# Patient Record
Sex: FEMALE | Race: WHITE
[De-identification: names, ages, dates, MRNs, and addresses within clinical notes are randomized per-mention and may not be internally consistent; named-entity substitution may affect disease eponyms.]

---

## 2017-04-30 ENCOUNTER — HOSPITAL ENCOUNTER (OUTPATIENT)
Age: 82
Discharge: ANOTHER HOSPITAL | End: 2017-04-30
Payer: MEDICARE

## 2017-04-30 ENCOUNTER — HOSPITAL ENCOUNTER (EMERGENCY)
Age: 82
Discharge: HOME | End: 2017-04-30
Payer: MEDICARE
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
## 2017-04-30 DIAGNOSIS — Z79.82: ICD-10-CM
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
## 2017-04-30 DIAGNOSIS — Z86.73: ICD-10-CM

## 2017-04-30 DIAGNOSIS — I10: ICD-10-CM

## 2017-04-30 DIAGNOSIS — S00.01XA: ICD-10-CM
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
## 2017-04-30 DIAGNOSIS — S00.03XA: Primary | ICD-10-CM
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
## 2017-04-30 DIAGNOSIS — G20: ICD-10-CM

## 2017-04-30 DIAGNOSIS — S01.01XA: Primary | ICD-10-CM

## 2017-04-30 PROCEDURE — 99284 EMERGENCY DEPT VISIT MOD MDM: CPT

## 2017-04-30 PROCEDURE — 99283 EMERGENCY DEPT VISIT LOW MDM: CPT

## 2017-06-30 ENCOUNTER — HOSPITAL ENCOUNTER (EMERGENCY)
Dept: HOSPITAL 76 - ED | Age: 82
Discharge: HOME | End: 2017-06-30
Payer: MEDICARE

## 2017-06-30 ENCOUNTER — HOSPITAL ENCOUNTER (OUTPATIENT)
Dept: HOSPITAL 76 - EMS | Age: 82
Discharge: ANOTHER HOSPITAL | End: 2017-06-30
Attending: SURGERY
Payer: MEDICARE

## 2017-06-30 VITALS — SYSTOLIC BLOOD PRESSURE: 128 MMHG | DIASTOLIC BLOOD PRESSURE: 81 MMHG

## 2017-06-30 DIAGNOSIS — I10: ICD-10-CM

## 2017-06-30 DIAGNOSIS — Z79.82: ICD-10-CM
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
## 2017-06-30 DIAGNOSIS — R53.1: ICD-10-CM
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
## 2017-06-30 DIAGNOSIS — Z86.73: ICD-10-CM
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
## 2017-06-30 DIAGNOSIS — G20: ICD-10-CM

## 2017-06-30 DIAGNOSIS — S42.202A: ICD-10-CM

## 2017-06-30 DIAGNOSIS — S00.93XA: Primary | ICD-10-CM

## 2017-06-30 DIAGNOSIS — M25.512: Primary | ICD-10-CM
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
## 2017-06-30 LAB
ANION GAP SERPL CALCULATED.4IONS-SCNC: 7 MMOL/L (ref 6–13)
BASOPHILS NFR BLD AUTO: 0.1 10^3/UL (ref 0–0.1)
BASOPHILS NFR BLD AUTO: 1 %
BUN SERPL-MCNC: 21 MG/DL (ref 6–20)
CALCIUM UR-MCNC: 8.9 MG/DL (ref 8.5–10.3)
CHLORIDE SERPL-SCNC: 102 MMOL/L (ref 101–111)
CO2 SERPL-SCNC: 30 MMOL/L (ref 21–32)
CREAT SERPLBLD-SCNC: 0.5 MG/DL (ref 0.4–1)
EOSINOPHIL # BLD AUTO: 0.1 10^3/UL (ref 0–0.7)
EOSINOPHIL NFR BLD AUTO: 1.1 %
ERYTHROCYTE [DISTWIDTH] IN BLOOD BY AUTOMATED COUNT: 13.7 % (ref 12–15)
GFRSERPLBLD MDRD-ARVRAT: 118 ML/MIN/{1.73_M2} (ref 89–?)
GLUCOSE SERPL-MCNC: 139 MG/DL (ref 70–100)
HCT VFR BLD AUTO: 40.2 % (ref 37–47)
HGB UR QL STRIP: 13.4 G/DL (ref 12–16)
INR PPP: 1.1 (ref 0.8–1.2)
LYMPHOCYTES # SPEC AUTO: 1.5 10^3/UL (ref 1.5–3.5)
LYMPHOCYTES NFR BLD AUTO: 27.1 %
MCH RBC QN AUTO: 27.7 PG (ref 27–31)
MCHC RBC AUTO-ENTMCNC: 33.4 G/DL (ref 32–36)
MCV RBC AUTO: 83.1 FL (ref 81–99)
MONOCYTES # BLD AUTO: 0.4 10^3/UL (ref 0–1)
MONOCYTES NFR BLD AUTO: 7.5 %
NEUTROPHILS # BLD AUTO: 3.5 10^3/UL (ref 1.5–6.6)
NEUTROPHILS # SNV AUTO: 5.5 X10^3/UL (ref 4.8–10.8)
NEUTROPHILS NFR BLD AUTO: 63.3 %
NRBC # BLD AUTO: 0 /100WBC
PDW BLD AUTO: 7.7 FL (ref 7.9–10.8)
POTASSIUM SERPL-SCNC: 3.5 MMOL/L (ref 3.5–5)
PROTHROM ACT/NOR PPP: 12.3 SECS (ref 9.9–12.6)
RBC MAR: 4.84 10^6/UL (ref 4.2–5.4)
SODIUM SERPLBLD-SCNC: 139 MMOL/L (ref 135–145)
WBC # BLD: 5.5 X10^3/UL

## 2017-06-30 PROCEDURE — 36415 COLL VENOUS BLD VENIPUNCTURE: CPT

## 2017-06-30 PROCEDURE — 99284 EMERGENCY DEPT VISIT MOD MDM: CPT

## 2017-06-30 PROCEDURE — 85610 PROTHROMBIN TIME: CPT

## 2017-06-30 PROCEDURE — 80048 BASIC METABOLIC PNL TOTAL CA: CPT

## 2017-06-30 PROCEDURE — 99285 EMERGENCY DEPT VISIT HI MDM: CPT

## 2017-06-30 PROCEDURE — 96375 TX/PRO/DX INJ NEW DRUG ADDON: CPT

## 2017-06-30 PROCEDURE — 96374 THER/PROPH/DIAG INJ IV PUSH: CPT

## 2017-06-30 PROCEDURE — 85025 COMPLETE CBC W/AUTO DIFF WBC: CPT

## 2017-06-30 PROCEDURE — 70450 CT HEAD/BRAIN W/O DYE: CPT

## 2017-06-30 PROCEDURE — 72125 CT NECK SPINE W/O DYE: CPT

## 2017-06-30 NOTE — CT PRELIMINARY REPORT
Accession: [id number]

Exam: CT Cervical Spine W/O

 

IMPRESSION: 

1. No acute fracture. 

2. Normal prevertebral soft tissues. Multilevel degenerative disk and facet arthropathy, see above.

 

[facility]

 

SITE ID: [id number]

## 2017-06-30 NOTE — CT REPORT
EXAM:

CT CERVICAL SPINE WITHOUT CONTRAST

 

DATE: 6/30/2017 07:40 p.m.

 

HISTORY: Fall.

 

COMPARISONS: None.

 

TECHNIQUE: Thin-section axial images were acquired of the cervical spine without contrast. Post-proce
ssing: Coronal and sagittal reformats. Other: None.

 

In accordance with CT protocol optimization, one or more of the following dose reduction techniques w
ere utilized for this exam: automated exposure control, adjustment of mA and/or KV based on patient s
ize, or use of iterative reconstructive technique.

 

FINDINGS: 

Alignment: Convex to the right curvature of the cervical spine is noted. 1 mm C4 on C5 anterolisthesi
s.

 

Bones: No fracture or bone lesion.

 

Interspace Levels/Facets: Moderate C5-C6 and C6-C7 and mild C7-T1 disk narrowing. Prominent C5-C6 and
 C6-C7 disk osteophyte complex is noted. Marked left C3-C4, C4-C5, C6-C7 and C7-T1 facet arthropathy.
 Significant amount of hyperdense pannus surrounds the dens.

 

Musculature: Normal. No fatty atrophy.

 

Other: The paravertebral and prevertebral soft tissues are normal. The lung apices are clear. Small a
mount of fluid in the right mastoid tip. No fracture noted.

 

IMPRESSION: 

1. No acute fracture. 

2. Normal prevertebral soft tissues. Multilevel degenerative disk and facet arthropathy, see above.

 

[facility]

Referring Provider Line: [phone]

 

SITE ID: [id number]

## 2017-06-30 NOTE — CT PRELIMINARY REPORT
Accession: [id number]

Exam: CT Head W/O

 

IMPRESSION: Generalized age-related cortical atrophic changes without evidence of acute intracranial 
abnormality.

 

[facility]

 

SITE ID: [id number]

## 2017-06-30 NOTE — ED PHYSICIAN DOCUMENTATION
PD HPI Fall





- Stated complaint


Stated Complaint: GLF





- History obtained from


History obtained from: Patient, EMS





- History of Present Illness


Mechanism of injury: Other (84-year-old woman with Parkinson's and A. fib, not 

anticoagulated presents after a ground-level fall from a sitting position, she 

hit her head and left arm on a bush and complains of severe left arm pain.  She 

has not been ambulatory since the incident.  No loss of consciousness.)





Review of Systems


Ten Systems: 10 systems reviewed and negative


Constitutional: denies: Fever, Chills


Eyes: denies: Loss of vision, Decreased vision, Photophobia


Ears: denies: Loss of hearing, Ear pain


Nose: denies: Rhinorrhea / runny nose


Throat: denies: Dental pain / toothache, Sore throat





PD PAST MEDICAL HISTORY





- Past Medical History


Cardiovascular: Hypertension, High cholesterol


Respiratory: None


Neuro: CVA, Parkinson's


Endocrine/Autoimmune: None


Psych: None


Musculoskeletal: Osteoarthritis, Osteoporosis, Fatigue





- Past Surgical History


/GYN: Hysterectomy





- Present Medications


Home Medications: 


 Ambulatory Orders











 Medication  Instructions  Recorded  Confirmed


 


Aspirin 325 mg PO DAILY 04/30/17 04/30/17


 


Carbidopa/Levodopa [Carbidopa-Levo 25 - 100 mg PO QID 04/30/17 06/30/17





ER  Tab]   


 


Cholecalciferol (Vitamin D3) 2,000 mg PO DAILY 04/30/17 04/30/17





[Vitamin D3]   


 


Cyanocobalamin (Vitamin B-12) 1 tab PO DAILY 04/30/17 04/30/17





[Vitamin B-12]   


 


Estradiol [Estrace] 0.1 mg PO 04/30/17 


 


Hydrochlorothiazide 12.5 mg PO DAILY 04/30/17 04/30/17


 


Ibuprofen [Motrin] 600 mg PO Q6H 04/30/17 04/30/17


 


Ipratropium Bromide  BID 04/30/17 


 


Mirtazapine 7.5 mg PO DAILY 04/30/17 04/30/17


 


Pramipexole [Mirapex] 1 tab PO DAILY 04/30/17 04/30/17


 


Tamsulosin [Flomax] 1 tab PO DAILY 04/30/17 04/30/17


 


Oxycodone HCl/Acetaminophen 1 - 2 tab PO Q4H PRN #20 tablet 06/30/17 





[Percocet 5-325 mg Tablet]   














- Allergies


Allergies/Adverse Reactions: 


 Allergies











Allergy/AdvReac Type Severity Reaction Status Date / Time


 


Sulfa (Sulfonamide Allergy  Rash Verified 06/30/17 19:11





Antibiotics)     














- Social History


Does the pt smoke?: No


Smoking Status: Never smoker





- Family History


Family history: reports: Non contributory





- Immunizations


Immunizations are current?: Yes





PD ED PE NORMAL





- Vitals


Vital signs reviewed: Yes





- General


General: Alert and oriented X 3, No acute distress





- HEENT


HEENT: PERRL, EOMI





- Neck


Neck: Other (C-collar is maintained pending imaging despite no midline bony 

tenderness given potential distracting injury from the left arm)





- Cardiac


Cardiac: Other





- Respiratory


Respiratory: No respiratory distress, Clear bilaterally





- Abdomen


Abdomen: Soft, Non tender





- Back


Back: No CVA TTP, No spinal TTP





- Derm


Derm: Normal color, Warm and dry





- Extremities


Extremities: Other (Very tender the upper left humerus, normal neurovascular 

status distal to this.)





- Neuro


Neuro: Alert and oriented X 3, Normal speech





- Psych


Psych: Normal mood, Normal affect





Results





- Vitals


Vitals: 


 Vital Signs - 24 hr











  06/30/17 06/30/17





  19:04 20:35


 


Temperature 36.2 C L 


 


Heart Rate 82 68


 


Respiratory 17 15





Rate  


 


Blood Pressure 153/76 H 126/65


 


O2 Saturation 92 94








 Oxygen











O2 Source                      Room air

















- Labs


Labs: 


 Laboratory Tests











  06/30/17 06/30/17 06/30/17





  19:20 19:20 19:20


 


WBC  5.5  


 


RBC  4.84  


 


Hgb  13.4  


 


Hct  40.2  


 


MCV  83.1  


 


MCH  27.7  


 


MCHC  33.4  


 


RDW  13.7  


 


Plt Count  232  


 


MPV  7.7 L  


 


Neut #  3.5  


 


Lymph #  1.5  


 


Mono #  0.4  


 


Eos #  0.1  


 


Baso #  0.1  


 


Absolute Nucleated RBC  0.00  


 


Nucleated RBCs  0.0  


 


PT   12.3 


 


INR   1.1 


 


Sodium    139


 


Potassium    3.5


 


Chloride    102


 


Carbon Dioxide    30


 


Anion Gap    7.0


 


BUN    21 H


 


Creatinine    0.5


 


Estimated GFR (MDRD)    118


 


Glucose    139 H


 


Calcium    8.9














- Rads (name of study)


  ** CT Head and Cspine


Radiology: EMP read contemporaneously (Age-related changes without acute disease

)





  ** Left humerus


Radiology: EMP read contemporaneously (3 part surgical neck fracture)





PD MEDICAL DECISION MAKING





- ED course


ED course: 





84-year-old woman after ground-level fall with obvious arm injury, CT of the 

head and C-spine without acute disease, left arm was placed in a sling and the 

supportive daughter and granddaughter were counseled as to the conservative 

nature of this fracture generally and follow up with orthopedics and they were 

comfortable with the plan.





Departure





- Departure


Disposition: 01 Home, Self Care


Clinical Impression: 


Fall


Qualifiers:


 Encounter type: initial encounter Qualified Code(s): W19.XXXA - Unspecified 

fall, initial encounter





Head contusion


Qualifiers:


 Encounter type: initial encounter Contusion of head detail: unspecified part 

of head Qualified Code(s): S00.93XA - Contusion of unspecified part of head, 

initial encounter





Left humeral fracture


Qualifiers:


 Encounter type: initial encounter Humerus Location: proximal Fracture type: 

closed Fracture morphology: unspecified fracture morphology Qualified Code(s): 

S42.202A - Unspecified fracture of upper end of left humerus, initial encounter 

for closed fracture


Record reviewed to determine appropriate education?: Yes


Instructions:  ED Fx Upper Ext


Follow-Up: 


[facility] [Provider Group] - Within 1 week


Prescriptions: 


Oxycodone HCl/Acetaminophen [Percocet 5-325 mg Tablet] 1 - 2 tab PO Q4H PRN #20 

tablet


 PRN Reason: Pain


Comments: 


Call your doctor to arrange a follow-up appointment, make the next available 

appointment.  In the interim, return anytime if worse or if new symptoms 

develop.





Do not drink or drive while taking narcotic pain medication.


Note that many narcotic pain relievers also contain Tylenol/acetaminophen.  

Please ensure that your total dose of acetaminophen from all sources does not 

exceed 3 g (3000 mg) per day.


You may get constipated while on this medication.  Take a stool softener such 

as Colace twice a day while you are on it.  Also add an over-the-counter 

laxative such as senna or MiraLAX on any day that you do not have a bowel 

movement.


If you received a narcotic pain medication or sedative while in the emergency 

department, do not drive for the next 24 hours.

## 2017-06-30 NOTE — XRAY REPORT
EXAM:

LEFT HUMERUS RADIOGRAPHY

 

EXAM DATE: 6/30/2017 07:59 PM.

 

CLINICAL HISTORY: Status post fall, left humeral pain.

 

COMPARISON: None.

 

TECHNIQUE: 2 views.

 

FINDINGS: 

Bones: There is contour deformity about the humeral neck with a probable mildly displaced greater tub
erosity avulsion fracture and transverse humeral neck fracture. The remainder of the humeral shaft is
 intact.

 

Joints: The humeral head and elbow joints appear normally located. The AC joint is normally located.

 

Soft Tissues: Normal. No soft tissue swelling.

 

IMPRESSION: 

1. Comminuted three-part humeral neck fracture as described.

2. No dislocation.

 

[facility]

Referring Provider Line: [phone]

 

SITE ID: [id number]

## 2017-06-30 NOTE — CT REPORT
EXAM:

CT HEAD

 

EXAM DATE: 6/30/2017 08:07 p.m.

 

CLINICAL HISTORY: Head injury.

 

COMPARISON: None.

 

TECHNIQUE: Multiaxial CT images were obtained from the foramen magnum to the vertex. IV contrast: Non
e. Reformats: Coronal.

 

In accordance with CT protocol optimization, one or more of the following dose reduction techniques w
ere utilized for this exam: automated exposure control, adjustment of mA and/or KV based on patient s
ize, or use of iterative reconstructive technique.

 

FINDINGS:

Parenchyma: No intraparenchymal hemorrhage. No evidence of mass, midline shift, or CT findings of acu
te infarction. Gray-white differentiation is distinct. Old right cerebellar infarct.

 

Extraaxial Spaces: Normal for age. No subdural or epidural collections identified.

 

Ventricles: The ventricles and cortical sulci are enlarged, consistent with age-related tissue loss.

 

Sinuses: Small amount of fluid in the right mastoid tip. Paranasal sinuses are clear. Otherwise, the 
imaged paranasal sinuses, orbits, and mastoids show no significant abnormality.

 

Bones: No evidence of fracture or calvarial defect.

 

Other: Diffuse chronic microangiopathic white matter changes are evident.

 

IMPRESSION: 

Generalized age-related cortical atrophic changes without evidence of acute intracranial abnormality.


 

[facility]

Referring Provider Line: [phone]

 

SITE ID: [id number]

## 2017-06-30 NOTE — XRAY PRELIMINARY REPORT
Accession: [id number]

Exam: XR Humerus LT

 

IMPRESSION: 

1. Comminuted three-part humeral neck fracture as described.

2. No dislocation.

 

[facility]

 

SITE ID: [id number]

## 2017-07-01 ENCOUNTER — HOSPITAL ENCOUNTER (OUTPATIENT)
Dept: HOSPITAL 76 - EMS | Age: 82
End: 2017-07-01
Attending: SURGERY
Payer: MEDICARE

## 2017-07-01 DIAGNOSIS — R09.89: Primary | ICD-10-CM

## 2017-07-01 DIAGNOSIS — T17.928A: ICD-10-CM

## 2020-03-09 ENCOUNTER — HOSPITAL ENCOUNTER (OUTPATIENT)
Dept: HOSPITAL 76 - LAB.R | Age: 85
End: 2020-03-09
Attending: INTERNAL MEDICINE
Payer: MEDICARE

## 2020-03-09 DIAGNOSIS — Z87.440: Primary | ICD-10-CM

## 2020-03-09 LAB
CLARITY UR REFRACT.AUTO: (no result)
GLUCOSE UR QL STRIP.AUTO: NEGATIVE MG/DL
KETONES UR QL STRIP.AUTO: NEGATIVE MG/DL
NITRITE UR QL STRIP.AUTO: POSITIVE
PH UR STRIP.AUTO: 7.5 PH (ref 5–7.5)
PROT UR STRIP.AUTO-MCNC: (no result) MG/DL
RBC # UR STRIP.AUTO: (no result) /UL
RBC # URNS HPF: (no result) /HPF (ref 0–5)
SP GR UR STRIP.AUTO: 1.02 (ref 1–1.03)
SQUAMOUS URNS QL MICRO: (no result)
UROBILINOGEN UR QL STRIP.AUTO: (no result) E.U./DL
UROBILINOGEN UR STRIP.AUTO-MCNC: NEGATIVE MG/DL

## 2020-03-09 PROCEDURE — 81001 URINALYSIS AUTO W/SCOPE: CPT

## 2020-03-09 PROCEDURE — 87086 URINE CULTURE/COLONY COUNT: CPT

## 2020-03-27 ENCOUNTER — HOSPITAL ENCOUNTER (OUTPATIENT)
Dept: HOSPITAL 76 - LAB.R | Age: 85
Discharge: HOME | End: 2020-03-27
Attending: INTERNAL MEDICINE
Payer: MEDICARE

## 2020-03-27 DIAGNOSIS — R30.0: Primary | ICD-10-CM

## 2020-03-27 LAB
CLARITY UR REFRACT.AUTO: (no result)
GLUCOSE UR QL STRIP.AUTO: NEGATIVE MG/DL
KETONES UR QL STRIP.AUTO: (no result) MG/DL
NITRITE UR QL STRIP.AUTO: POSITIVE
PH UR STRIP.AUTO: 6.5 PH (ref 5–7.5)
PROT UR STRIP.AUTO-MCNC: 100 MG/DL
RBC # UR STRIP.AUTO: (no result) /UL
RBC # URNS HPF: (no result) /HPF (ref 0–5)
SP GR UR STRIP.AUTO: >=1.03 (ref 1–1.03)
SQUAMOUS URNS QL MICRO: (no result)
UROBILINOGEN UR QL STRIP.AUTO: (no result) E.U./DL
UROBILINOGEN UR STRIP.AUTO-MCNC: NEGATIVE MG/DL
WBC CLUMPS URNS QL MICRO: PRESENT

## 2020-03-27 PROCEDURE — 87181 SC STD AGAR DILUTION PER AGT: CPT

## 2020-03-27 PROCEDURE — 87077 CULTURE AEROBIC IDENTIFY: CPT

## 2020-03-27 PROCEDURE — 87086 URINE CULTURE/COLONY COUNT: CPT

## 2020-03-27 PROCEDURE — 81001 URINALYSIS AUTO W/SCOPE: CPT

## 2020-06-23 ENCOUNTER — HOSPITAL ENCOUNTER (OUTPATIENT)
Dept: HOSPITAL 76 - LAB.S | Age: 85
Discharge: HOME | End: 2020-06-23
Attending: INTERNAL MEDICINE
Payer: MEDICARE

## 2020-06-23 DIAGNOSIS — R30.0: Primary | ICD-10-CM

## 2020-06-23 LAB
CLARITY UR REFRACT.AUTO: (no result)
GLUCOSE UR QL STRIP.AUTO: NEGATIVE MG/DL
KETONES UR QL STRIP.AUTO: (no result) MG/DL
NITRITE UR QL STRIP.AUTO: POSITIVE
PH UR STRIP.AUTO: 7 PH (ref 5–7.5)
PROT UR STRIP.AUTO-MCNC: 30 MG/DL
RBC # UR STRIP.AUTO: (no result) /UL
RBC # URNS HPF: (no result) /HPF (ref 0–5)
SP GR UR STRIP.AUTO: 1.02 (ref 1–1.03)
SQUAMOUS URNS QL MICRO: (no result)
UROBILINOGEN UR QL STRIP.AUTO: (no result) E.U./DL
UROBILINOGEN UR STRIP.AUTO-MCNC: NEGATIVE MG/DL

## 2020-06-23 PROCEDURE — 87181 SC STD AGAR DILUTION PER AGT: CPT

## 2020-06-23 PROCEDURE — 81001 URINALYSIS AUTO W/SCOPE: CPT

## 2020-06-23 PROCEDURE — 87077 CULTURE AEROBIC IDENTIFY: CPT

## 2020-06-23 PROCEDURE — 87086 URINE CULTURE/COLONY COUNT: CPT

## 2021-05-07 ENCOUNTER — HOSPITAL ENCOUNTER (OUTPATIENT)
Dept: HOSPITAL 76 - EMS | Age: 86
End: 2021-05-07
Payer: MEDICARE

## 2021-05-07 DIAGNOSIS — Z03.89: Primary | ICD-10-CM

## 2021-07-19 ENCOUNTER — HOSPITAL ENCOUNTER (OUTPATIENT)
Dept: HOSPITAL 76 - LAB.S | Age: 86
Discharge: HOME | End: 2021-07-19
Attending: PHYSICIAN ASSISTANT
Payer: MEDICARE

## 2021-07-19 DIAGNOSIS — N30.90: Primary | ICD-10-CM

## 2021-07-19 PROCEDURE — 87086 URINE CULTURE/COLONY COUNT: CPT

## 2021-08-04 ENCOUNTER — HOSPITAL ENCOUNTER (OUTPATIENT)
Dept: HOSPITAL 76 - LAB.R | Age: 86
Discharge: HOME | End: 2021-08-04
Attending: EMERGENCY MEDICINE
Payer: MEDICARE

## 2021-08-04 DIAGNOSIS — R30.0: Primary | ICD-10-CM

## 2021-08-04 LAB
CLARITY UR REFRACT.AUTO: (no result)
GLUCOSE UR QL STRIP.AUTO: NEGATIVE MG/DL
KETONES UR QL STRIP.AUTO: NEGATIVE MG/DL
MUCOUS THREADS URNS QL MICRO: (no result)
NITRITE UR QL STRIP.AUTO: NEGATIVE
PH UR STRIP.AUTO: 8.5 PH (ref 5–7.5)
PROT UR STRIP.AUTO-MCNC: 30 MG/DL
RBC # UR STRIP.AUTO: (no result) /UL
RBC # URNS HPF: (no result) /HPF (ref 0–5)
SP GR UR STRIP.AUTO: 1.01 (ref 1–1.03)
SQUAMOUS URNS QL MICRO: (no result)
UROBILINOGEN UR QL STRIP.AUTO: (no result) E.U./DL
UROBILINOGEN UR STRIP.AUTO-MCNC: NEGATIVE MG/DL

## 2021-08-04 PROCEDURE — 81001 URINALYSIS AUTO W/SCOPE: CPT

## 2021-08-04 PROCEDURE — 81003 URINALYSIS AUTO W/O SCOPE: CPT

## 2021-08-04 PROCEDURE — 87086 URINE CULTURE/COLONY COUNT: CPT

## 2021-08-21 ENCOUNTER — HOSPITAL ENCOUNTER (OUTPATIENT)
Dept: HOSPITAL 76 - EMS | Age: 86
Discharge: ANOTHER HOSPITAL | End: 2021-08-21
Payer: COMMERCIAL

## 2021-08-21 DIAGNOSIS — M25.552: ICD-10-CM

## 2021-08-21 DIAGNOSIS — Z04.3: Primary | ICD-10-CM

## 2021-09-04 ENCOUNTER — HOSPITAL ENCOUNTER (OUTPATIENT)
Dept: HOSPITAL 76 - LAB.R | Age: 86
Discharge: HOME | End: 2021-09-04
Attending: FAMILY MEDICINE
Payer: MEDICARE

## 2021-09-04 DIAGNOSIS — D64.9: Primary | ICD-10-CM

## 2021-09-04 DIAGNOSIS — G20: ICD-10-CM

## 2021-09-04 DIAGNOSIS — I48.91: ICD-10-CM

## 2021-09-04 LAB
ALBUMIN DIAFP-MCNC: 2.9 G/DL (ref 3.2–5.5)
ALBUMIN/GLOB SERPL: 1.2 {RATIO} (ref 1–2.2)
ALP SERPL-CCNC: 63 IU/L (ref 42–121)
ALT SERPL W P-5'-P-CCNC: 15 IU/L (ref 10–60)
ANION GAP SERPL CALCULATED.4IONS-SCNC: 9 MMOL/L (ref 6–13)
AST SERPL W P-5'-P-CCNC: 17 IU/L (ref 10–42)
BASOPHILS NFR BLD AUTO: 0 10^3/UL (ref 0–0.1)
BASOPHILS NFR BLD AUTO: 0.6 %
BILIRUB BLD-MCNC: 0.7 MG/DL (ref 0.2–1)
BUN SERPL-MCNC: 20 MG/DL (ref 6–20)
CALCIUM UR-MCNC: 8.9 MG/DL (ref 8.5–10.3)
CHLORIDE SERPL-SCNC: 105 MMOL/L (ref 101–111)
CO2 SERPL-SCNC: 27 MMOL/L (ref 21–32)
CREAT SERPLBLD-SCNC: 0.6 MG/DL (ref 0.4–1)
EOSINOPHIL # BLD AUTO: 0.1 10^3/UL (ref 0–0.7)
EOSINOPHIL NFR BLD AUTO: 1.4 %
ERYTHROCYTE [DISTWIDTH] IN BLOOD BY AUTOMATED COUNT: 14.6 % (ref 12–15)
GFRSERPLBLD MDRD-ARVRAT: 94 ML/MIN/{1.73_M2} (ref 89–?)
GLOBULIN SER-MCNC: 2.5 G/DL (ref 2.1–4.2)
GLUCOSE SERPL-MCNC: 105 MG/DL (ref 70–100)
HCT VFR BLD AUTO: 37.4 % (ref 37–47)
HGB UR QL STRIP: 11.5 G/DL (ref 12–16)
LYMPHOCYTES # SPEC AUTO: 0.7 10^3/UL (ref 1.5–3.5)
LYMPHOCYTES NFR BLD AUTO: 9.5 %
MCH RBC QN AUTO: 28.7 PG (ref 27–31)
MCHC RBC AUTO-ENTMCNC: 30.7 G/DL (ref 32–36)
MCV RBC AUTO: 93.3 FL (ref 81–99)
MONOCYTES # BLD AUTO: 0.5 10^3/UL (ref 0–1)
MONOCYTES NFR BLD AUTO: 7.5 %
NEUTROPHILS # BLD AUTO: 5.6 10^3/UL (ref 1.5–6.6)
NEUTROPHILS # SNV AUTO: 7 X10^3/UL (ref 4.8–10.8)
NEUTROPHILS NFR BLD AUTO: 79.7 %
NRBC # BLD AUTO: 0 /100WBC
NRBC # BLD AUTO: 0 X10^3/UL
PDW BLD AUTO: 9.2 FL (ref 7.9–10.8)
PLATELET # BLD: 440 10^3/UL (ref 130–450)
POTASSIUM SERPL-SCNC: 4.3 MMOL/L (ref 3.5–5)
PROT SPEC-MCNC: 5.4 G/DL (ref 6.7–8.2)
RBC MAR: 4.01 10^6/UL (ref 4.2–5.4)
SODIUM SERPLBLD-SCNC: 141 MMOL/L (ref 135–145)

## 2021-09-04 PROCEDURE — 85025 COMPLETE CBC W/AUTO DIFF WBC: CPT

## 2021-09-04 PROCEDURE — 82728 ASSAY OF FERRITIN: CPT

## 2021-09-04 PROCEDURE — 80053 COMPREHEN METABOLIC PANEL: CPT

## 2021-10-02 ENCOUNTER — HOSPITAL ENCOUNTER (OUTPATIENT)
Dept: HOSPITAL 76 - LAB.R | Age: 86
End: 2021-10-02
Attending: FAMILY MEDICINE
Payer: MEDICARE

## 2021-10-02 DIAGNOSIS — I10: Primary | ICD-10-CM

## 2021-10-02 PROCEDURE — 80048 BASIC METABOLIC PNL TOTAL CA: CPT

## 2021-10-02 PROCEDURE — 85025 COMPLETE CBC W/AUTO DIFF WBC: CPT

## 2021-10-03 LAB
ANION GAP SERPL CALCULATED.4IONS-SCNC: 8 MMOL/L (ref 6–13)
BASOPHILS NFR BLD AUTO: 0.1 10^3/UL (ref 0–0.1)
BASOPHILS NFR BLD AUTO: 0.8 %
BUN SERPL-MCNC: 23 MG/DL (ref 6–20)
CALCIUM UR-MCNC: 9.1 MG/DL (ref 8.5–10.3)
CHLORIDE SERPL-SCNC: 101 MMOL/L (ref 101–111)
CO2 SERPL-SCNC: 29 MMOL/L (ref 21–32)
CREAT SERPLBLD-SCNC: 0.6 MG/DL (ref 0.4–1)
EOSINOPHIL # BLD AUTO: 0.4 10^3/UL (ref 0–0.7)
EOSINOPHIL NFR BLD AUTO: 5.9 %
ERYTHROCYTE [DISTWIDTH] IN BLOOD BY AUTOMATED COUNT: 13.3 % (ref 12–15)
GFRSERPLBLD MDRD-ARVRAT: 94 ML/MIN/{1.73_M2} (ref 89–?)
GLUCOSE SERPL-MCNC: 106 MG/DL (ref 70–100)
HCT VFR BLD AUTO: 39.8 % (ref 37–47)
HGB UR QL STRIP: 12.5 G/DL (ref 12–16)
LYMPHOCYTES # SPEC AUTO: 1.2 10^3/UL (ref 1.5–3.5)
LYMPHOCYTES NFR BLD AUTO: 17.9 %
MCH RBC QN AUTO: 27.8 PG (ref 27–31)
MCHC RBC AUTO-ENTMCNC: 31.4 G/DL (ref 32–36)
MCV RBC AUTO: 88.4 FL (ref 81–99)
MONOCYTES # BLD AUTO: 0.5 10^3/UL (ref 0–1)
MONOCYTES NFR BLD AUTO: 8.3 %
NEUTROPHILS # BLD AUTO: 4.3 10^3/UL (ref 1.5–6.6)
NEUTROPHILS # SNV AUTO: 6.5 X10^3/UL (ref 4.8–10.8)
NEUTROPHILS NFR BLD AUTO: 66.5 %
NRBC # BLD AUTO: 0 /100WBC
NRBC # BLD AUTO: 0 X10^3/UL
PDW BLD AUTO: 9.7 FL (ref 7.9–10.8)
PLATELET # BLD: 306 10^3/UL (ref 130–450)
POTASSIUM SERPL-SCNC: 3.8 MMOL/L (ref 3.5–5)
RBC MAR: 4.5 10^6/UL (ref 4.2–5.4)
SODIUM SERPLBLD-SCNC: 138 MMOL/L (ref 135–145)

## 2021-10-29 ENCOUNTER — HOSPITAL ENCOUNTER (OUTPATIENT)
Dept: HOSPITAL 76 - LAB.R | Age: 86
End: 2021-10-29
Attending: FAMILY MEDICINE
Payer: MEDICARE

## 2021-10-29 DIAGNOSIS — I48.91: ICD-10-CM

## 2021-10-29 DIAGNOSIS — G20: Primary | ICD-10-CM

## 2021-10-29 DIAGNOSIS — N82.3: ICD-10-CM

## 2021-10-29 LAB
ALBUMIN DIAFP-MCNC: 3.5 G/DL (ref 3.2–5.5)
ALBUMIN/GLOB SERPL: 1.2 {RATIO} (ref 1–2.2)
ALP SERPL-CCNC: 70 IU/L (ref 42–121)
ALT SERPL W P-5'-P-CCNC: < 10 IU/L (ref 10–60)
ANION GAP SERPL CALCULATED.4IONS-SCNC: 10 MMOL/L (ref 6–13)
AST SERPL W P-5'-P-CCNC: 12 IU/L (ref 10–42)
BASOPHILS NFR BLD AUTO: 0.1 10^3/UL (ref 0–0.1)
BASOPHILS NFR BLD AUTO: 0.9 %
BILIRUB BLD-MCNC: 0.6 MG/DL (ref 0.2–1)
BUN SERPL-MCNC: 16 MG/DL (ref 6–20)
CALCIUM UR-MCNC: 9.4 MG/DL (ref 8.5–10.3)
CHLORIDE SERPL-SCNC: 104 MMOL/L (ref 101–111)
CO2 SERPL-SCNC: 29 MMOL/L (ref 21–32)
CREAT SERPLBLD-SCNC: 0.4 MG/DL (ref 0.4–1)
EOSINOPHIL # BLD AUTO: 0.2 10^3/UL (ref 0–0.7)
EOSINOPHIL NFR BLD AUTO: 2.9 %
ERYTHROCYTE [DISTWIDTH] IN BLOOD BY AUTOMATED COUNT: 13.1 % (ref 12–15)
GFRSERPLBLD MDRD-ARVRAT: 151 ML/MIN/{1.73_M2} (ref 89–?)
GLOBULIN SER-MCNC: 2.9 G/DL (ref 2.1–4.2)
GLUCOSE SERPL-MCNC: 100 MG/DL (ref 70–100)
HCT VFR BLD AUTO: 43.8 % (ref 37–47)
HGB UR QL STRIP: 13.5 G/DL (ref 12–16)
LYMPHOCYTES # SPEC AUTO: 0.9 10^3/UL (ref 1.5–3.5)
LYMPHOCYTES NFR BLD AUTO: 14.6 %
MCH RBC QN AUTO: 26.7 PG (ref 27–31)
MCHC RBC AUTO-ENTMCNC: 30.8 G/DL (ref 32–36)
MCV RBC AUTO: 86.6 FL (ref 81–99)
MONOCYTES # BLD AUTO: 0.5 10^3/UL (ref 0–1)
MONOCYTES NFR BLD AUTO: 7.8 %
NEUTROPHILS # BLD AUTO: 4.3 10^3/UL (ref 1.5–6.6)
NEUTROPHILS # SNV AUTO: 5.9 X10^3/UL (ref 4.8–10.8)
NEUTROPHILS NFR BLD AUTO: 73.5 %
NRBC # BLD AUTO: 0 /100WBC
NRBC # BLD AUTO: 0 X10^3/UL
PDW BLD AUTO: 10.2 FL (ref 7.9–10.8)
PLATELET # BLD: 281 10^3/UL (ref 130–450)
POTASSIUM SERPL-SCNC: 4 MMOL/L (ref 3.5–5)
PROT SPEC-MCNC: 6.4 G/DL (ref 6.7–8.2)
RBC MAR: 5.06 10^6/UL (ref 4.2–5.4)
SODIUM SERPLBLD-SCNC: 143 MMOL/L (ref 135–145)

## 2021-10-29 PROCEDURE — 81003 URINALYSIS AUTO W/O SCOPE: CPT

## 2021-10-29 PROCEDURE — 85025 COMPLETE CBC W/AUTO DIFF WBC: CPT

## 2021-10-29 PROCEDURE — 80053 COMPREHEN METABOLIC PANEL: CPT

## 2021-10-29 PROCEDURE — 87086 URINE CULTURE/COLONY COUNT: CPT

## 2021-10-29 PROCEDURE — 81001 URINALYSIS AUTO W/SCOPE: CPT

## 2021-10-30 ENCOUNTER — HOSPITAL ENCOUNTER (OUTPATIENT)
Dept: HOSPITAL 76 - LAB.R | Age: 86
End: 2021-10-30
Attending: FAMILY MEDICINE
Payer: MEDICARE

## 2021-10-30 DIAGNOSIS — N82.3: Primary | ICD-10-CM

## 2021-10-30 LAB
CLARITY UR REFRACT.AUTO: (no result)
GLUCOSE UR QL STRIP.AUTO: NEGATIVE MG/DL
KETONES UR QL STRIP.AUTO: (no result) MG/DL
NITRITE UR QL STRIP.AUTO: POSITIVE
PH UR STRIP.AUTO: 6 PH (ref 5–7.5)
PROT UR STRIP.AUTO-MCNC: NEGATIVE MG/DL
RBC # UR STRIP.AUTO: (no result) /UL
RBC # URNS HPF: (no result) /HPF (ref 0–5)
SP GR UR STRIP.AUTO: 1.02 (ref 1–1.03)
SQUAMOUS URNS QL MICRO: (no result)
UROBILINOGEN UR QL STRIP.AUTO: (no result) E.U./DL
UROBILINOGEN UR STRIP.AUTO-MCNC: NEGATIVE MG/DL

## 2021-10-30 PROCEDURE — 87086 URINE CULTURE/COLONY COUNT: CPT

## 2021-10-30 PROCEDURE — 81003 URINALYSIS AUTO W/O SCOPE: CPT

## 2021-10-30 PROCEDURE — 81001 URINALYSIS AUTO W/SCOPE: CPT

## 2021-11-09 ENCOUNTER — HOSPITAL ENCOUNTER (OUTPATIENT)
Dept: HOSPITAL 76 - EMS | Age: 86
Discharge: ANOTHER HOSPITAL | End: 2021-11-09
Payer: MEDICARE

## 2021-11-09 ENCOUNTER — HOSPITAL ENCOUNTER (EMERGENCY)
Dept: HOSPITAL 76 - ED | Age: 86
Discharge: HOME | End: 2021-11-09
Payer: MEDICARE

## 2021-11-09 ENCOUNTER — HOSPITAL ENCOUNTER (OUTPATIENT)
Dept: HOSPITAL 76 - EMS | Age: 86
Discharge: HOME | End: 2021-11-09
Attending: EMERGENCY MEDICINE
Payer: MEDICARE

## 2021-11-09 VITALS — DIASTOLIC BLOOD PRESSURE: 86 MMHG | SYSTOLIC BLOOD PRESSURE: 118 MMHG

## 2021-11-09 DIAGNOSIS — N30.00: Primary | ICD-10-CM

## 2021-11-09 DIAGNOSIS — N39.0: Primary | ICD-10-CM

## 2021-11-09 DIAGNOSIS — F03.90: ICD-10-CM

## 2021-11-09 DIAGNOSIS — F03.90: Primary | ICD-10-CM

## 2021-11-09 PROCEDURE — 99282 EMERGENCY DEPT VISIT SF MDM: CPT

## 2021-11-09 PROCEDURE — 99283 EMERGENCY DEPT VISIT LOW MDM: CPT

## 2021-11-09 NOTE — ED PHYSICIAN DOCUMENTATION
History of Present Illness





- Stated complaint


Stated Complaint: FEMALE 





- Chief complaint


Chief Complaint: UTI





- History obtained from


History obtained from: Patient, EMS





- History of Present Illness


Timing: Today


Pain level max: 0


Pain level now: 0





- Additonal information


Additional information: 





Patient is an 88-year-old female Who was sent over from [facility] 

for UTI.  Has chronic recurrent UTIs.  Has a chronic rectovaginal fistula.  

Apparently their medical director has retired and they do not have a new medical

director so she could not be prescribed antibiotics.  Patient is afebrile with 

no complaints currently.





Review of Systems


Constitutional: denies: Fever


Respiratory: denies: Cough


GI: denies: Abdominal Pain, Vomiting





PD PAST MEDICAL HISTORY





- Past Medical History


Past Medical History: Yes


Cardiovascular: Hypertension, High cholesterol


Respiratory: None


Endocrine/Autoimmune: None


Psych: None


Musculoskeletal: Osteoarthritis, Osteoporosis, Fatigue





- Past Surgical History


/GYN: Hysterectomy





- Present Medications


Home Medications: 


                                Ambulatory Orders











 Medication  Instructions  Recorded  Confirmed


 


Aspirin 325 mg PO DAILY 04/30/17 04/30/17


 


Carbidopa/Levodopa [Carbidopa-Levo 25 - 100 mg PO QID 04/30/17 06/30/17





ER  Tab]   


 


Cholecalciferol (Vitamin D3) 2,000 mg PO DAILY 04/30/17 04/30/17





[Vitamin D3]   


 


Cyanocobalamin (Vitamin B-12) 1 tab PO DAILY 04/30/17 04/30/17





[Vitamin B-12]   


 


Estradiol [Estrace] 0.1 mg PO 04/30/17 


 


Ibuprofen [Motrin] 600 mg PO Q6H 04/30/17 04/30/17


 


Ipratropium Bromide BID 04/30/17 


 


Mirtazapine 7.5 mg PO DAILY 04/30/17 04/30/17


 


Pramipexole [Mirapex] 1 tab PO DAILY 04/30/17 04/30/17


 


Tamsulosin [Flomax] 1 tab PO DAILY 04/30/17 04/30/17


 


hydroCHLOROthiazide 12.5 mg PO DAILY 04/30/17 04/30/17





[Hydrochlorothiazide]   


 


Oxycodone HCl/Acetaminophen 1 - 2 tab PO Q4H PRN #20 tablet 06/30/17 





[Percocet 5-325 mg Tablet]   


 


cephALEXin [Keflex] 500 mg PO Q6H #20 cap 11/09/21 














- Allergies


Allergies/Adverse Reactions: 


                                    Allergies











Allergy/AdvReac Type Severity Reaction Status Date / Time


 


alendronate sodium Allergy  Unknown Verified 11/09/21 19:51





[From Fosamax]     


 


amoxicillin Allergy  Unknown Verified 11/09/21 19:51


 


oxycodone Allergy  Unknown Verified 11/09/21 19:51


 


risedronate sodium Allergy  Unknown Verified 11/09/21 19:51


 


Sulfa (Sulfonamide Allergy  Rash Verified 11/09/21 19:51





Antibiotics)     


 


tramadol Allergy  Unknown Verified 11/09/21 19:51














- Social History


Does the pt smoke?: No


Smoking Status: Never smoker





- Immunizations


Immunizations are current?: Yes





PD ED PE NORMAL





- Vitals


Vital signs reviewed: Yes





- General


General: No acute distress, Other (Alert, pleasant.  Oriented to person and 

place)





- HEENT


HEENT: PERRL, Moist mucous membranes





- Neck


Neck: Supple, no meningeal sign





- Cardiac


Cardiac: RRR





- Respiratory


Respiratory: No respiratory distress, Clear bilaterally





- Abdomen


Abdomen: Soft, Non tender, Non distended





- Female 


Female : Other (Incontinent in a diaper.  No significant skin breakdown.)





- Derm


Derm: Warm and dry





Results





- Vitals


Vitals: 


                               Vital Signs - 24 hr











  11/09/21 11/09/21





  19:46 20:04


 


Temperature 36.6 C 36.6 C


 


Heart Rate 91 91


 


Respiratory 18 18





Rate  


 


Blood Pressure 118/86 H 118/86 H


 


O2 Saturation 97 97








                                     Oxygen











O2 Source                      Room air

















PD MEDICAL DECISION MAKING





- ED course


Complexity details: reviewed old records, considered differential, d/w patient


ED course: 





Patient with symptoms of her recurrent UTI.  Reviewed her prior cultures and we 

will place her on cephalexin.  Appears to have tolerated this well in the past. 

Patient will be returned to [facility].





This document was made in part using voice recognition software. While efforts 

are made to proofread this document, sound alike and grammatical errors may 

occur.





Departure





- Departure


Disposition: 01 Home, Self Care


Clinical Impression: 


Urinary tract infection


Qualifiers:


 Urinary tract infection type: acute cystitis Hematuria presence: without 

hematuria Qualified Code(s): N30.00 - Acute cystitis without hematuria





Condition: Good


Instructions:  ED UTI Cystitis Female


Follow-Up: 


your,doctor as needed [Other]


Prescriptions: 


cephALEXin [Keflex] 500 mg PO Q6H #20 cap


Comments: 


Take all antibiotics until gone. Return if you worsen. 





The prescription was sent to the [facility] pharmacy.


Discharge Date/Time: 11/09/21 20:06

## 2022-03-05 ENCOUNTER — HOSPITAL ENCOUNTER (OUTPATIENT)
Dept: HOSPITAL 76 - LAB.R | Age: 87
Discharge: HOME | End: 2022-03-05
Attending: HOSPITALIST
Payer: MEDICARE

## 2022-03-05 DIAGNOSIS — I10: ICD-10-CM

## 2022-03-05 DIAGNOSIS — I48.91: ICD-10-CM

## 2022-03-05 DIAGNOSIS — G20: Primary | ICD-10-CM

## 2022-03-05 LAB
ALBUMIN DIAFP-MCNC: 3.6 G/DL (ref 3.2–5.5)
ALBUMIN/GLOB SERPL: 1.3 {RATIO} (ref 1–2.2)
ALP SERPL-CCNC: 68 IU/L (ref 42–121)
ALT SERPL W P-5'-P-CCNC: < 10 IU/L (ref 10–60)
ANION GAP SERPL CALCULATED.4IONS-SCNC: 9 MMOL/L (ref 6–13)
AST SERPL W P-5'-P-CCNC: 20 IU/L (ref 10–42)
BASOPHILS NFR BLD AUTO: 0 10^3/UL (ref 0–0.1)
BASOPHILS NFR BLD AUTO: 0.4 %
BILIRUB BLD-MCNC: 0.7 MG/DL (ref 0.2–1)
BUN SERPL-MCNC: 20 MG/DL (ref 6–20)
CALCIUM UR-MCNC: 9.4 MG/DL (ref 8.5–10.3)
CHLORIDE SERPL-SCNC: 101 MMOL/L (ref 101–111)
CO2 SERPL-SCNC: 30 MMOL/L (ref 21–32)
CREAT SERPLBLD-SCNC: 0.4 MG/DL (ref 0.4–1)
EOSINOPHIL # BLD AUTO: 0.1 10^3/UL (ref 0–0.7)
EOSINOPHIL NFR BLD AUTO: 1.3 %
ERYTHROCYTE [DISTWIDTH] IN BLOOD BY AUTOMATED COUNT: 15.3 % (ref 12–15)
GFRSERPLBLD MDRD-ARVRAT: 151 ML/MIN/{1.73_M2} (ref 89–?)
GLOBULIN SER-MCNC: 2.8 G/DL (ref 2.1–4.2)
GLUCOSE SERPL-MCNC: 124 MG/DL (ref 70–100)
HCT VFR BLD AUTO: 43.3 % (ref 37–47)
HGB UR QL STRIP: 13.3 G/DL (ref 12–16)
LYMPHOCYTES # SPEC AUTO: 1.1 10^3/UL (ref 1.5–3.5)
LYMPHOCYTES NFR BLD AUTO: 21.2 %
MCH RBC QN AUTO: 26.4 PG (ref 27–31)
MCHC RBC AUTO-ENTMCNC: 30.7 G/DL (ref 32–36)
MCV RBC AUTO: 85.9 FL (ref 81–99)
MONOCYTES # BLD AUTO: 0.5 10^3/UL (ref 0–1)
MONOCYTES NFR BLD AUTO: 9 %
NEUTROPHILS # BLD AUTO: 3.6 10^3/UL (ref 1.5–6.6)
NEUTROPHILS # SNV AUTO: 5.3 X10^3/UL (ref 4.8–10.8)
NEUTROPHILS NFR BLD AUTO: 67.5 %
NRBC # BLD AUTO: 0 /100WBC
NRBC # BLD AUTO: 0 X10^3/UL
PDW BLD AUTO: 9.7 FL (ref 7.9–10.8)
PLATELET # BLD: 218 10^3/UL (ref 130–450)
POTASSIUM SERPL-SCNC: 4.1 MMOL/L (ref 3.5–5)
PROT SPEC-MCNC: 6.4 G/DL (ref 6.7–8.2)
RBC MAR: 5.04 10^6/UL (ref 4.2–5.4)
SODIUM SERPLBLD-SCNC: 140 MMOL/L (ref 135–145)

## 2022-03-05 PROCEDURE — 85025 COMPLETE CBC W/AUTO DIFF WBC: CPT

## 2022-03-05 PROCEDURE — 80053 COMPREHEN METABOLIC PANEL: CPT

## 2022-03-07 ENCOUNTER — HOSPITAL ENCOUNTER (OUTPATIENT)
Dept: HOSPITAL 76 - LAB.R | Age: 87
Discharge: HOME | End: 2022-03-07
Attending: HOSPITALIST
Payer: MEDICARE

## 2022-03-07 DIAGNOSIS — G20: Primary | ICD-10-CM

## 2022-03-07 LAB
ALBUMIN DIAFP-MCNC: 3.7 G/DL (ref 3.2–5.5)
ALBUMIN/GLOB SERPL: 1.3 {RATIO} (ref 1–2.2)
ALP SERPL-CCNC: 75 IU/L (ref 42–121)
ALT SERPL W P-5'-P-CCNC: < 10 IU/L (ref 10–60)
ANION GAP SERPL CALCULATED.4IONS-SCNC: 10 MMOL/L (ref 6–13)
AST SERPL W P-5'-P-CCNC: 20 IU/L (ref 10–42)
BASOPHILS NFR BLD AUTO: 0 10^3/UL (ref 0–0.1)
BASOPHILS NFR BLD AUTO: 0.4 %
BILIRUB BLD-MCNC: 0.5 MG/DL (ref 0.2–1)
BUN SERPL-MCNC: 20 MG/DL (ref 6–20)
CALCIUM UR-MCNC: 9.4 MG/DL (ref 8.5–10.3)
CHLORIDE SERPL-SCNC: 101 MMOL/L (ref 101–111)
CO2 SERPL-SCNC: 28 MMOL/L (ref 21–32)
CREAT SERPLBLD-SCNC: 0.4 MG/DL (ref 0.4–1)
EOSINOPHIL # BLD AUTO: 0.1 10^3/UL (ref 0–0.7)
EOSINOPHIL NFR BLD AUTO: 2 %
ERYTHROCYTE [DISTWIDTH] IN BLOOD BY AUTOMATED COUNT: 15.4 % (ref 12–15)
GFRSERPLBLD MDRD-ARVRAT: 151 ML/MIN/{1.73_M2} (ref 89–?)
GLOBULIN SER-MCNC: 2.9 G/DL (ref 2.1–4.2)
GLUCOSE SERPL-MCNC: 128 MG/DL (ref 70–100)
HCT VFR BLD AUTO: 45.5 % (ref 37–47)
HGB UR QL STRIP: 13.9 G/DL (ref 12–16)
LYMPHOCYTES # SPEC AUTO: 1.5 10^3/UL (ref 1.5–3.5)
LYMPHOCYTES NFR BLD AUTO: 29.2 %
MCH RBC QN AUTO: 26.4 PG (ref 27–31)
MCHC RBC AUTO-ENTMCNC: 30.5 G/DL (ref 32–36)
MCV RBC AUTO: 86.3 FL (ref 81–99)
MONOCYTES # BLD AUTO: 0.4 10^3/UL (ref 0–1)
MONOCYTES NFR BLD AUTO: 7.9 %
NEUTROPHILS # BLD AUTO: 3 10^3/UL (ref 1.5–6.6)
NEUTROPHILS # SNV AUTO: 5.1 X10^3/UL (ref 4.8–10.8)
NEUTROPHILS NFR BLD AUTO: 59.9 %
NRBC # BLD AUTO: 0 /100WBC
NRBC # BLD AUTO: 0 X10^3/UL
PDW BLD AUTO: 10.2 FL (ref 7.9–10.8)
PLATELET # BLD: 239 10^3/UL (ref 130–450)
POTASSIUM SERPL-SCNC: 4 MMOL/L (ref 3.5–5)
PROT SPEC-MCNC: 6.6 G/DL (ref 6.7–8.2)
RBC MAR: 5.27 10^6/UL (ref 4.2–5.4)
SODIUM SERPLBLD-SCNC: 139 MMOL/L (ref 135–145)

## 2022-03-07 PROCEDURE — 85025 COMPLETE CBC W/AUTO DIFF WBC: CPT

## 2022-03-07 PROCEDURE — 80053 COMPREHEN METABOLIC PANEL: CPT

## 2022-07-04 ENCOUNTER — HOSPITAL ENCOUNTER (OUTPATIENT)
Dept: HOSPITAL 76 - LAB.R | Age: 87
Discharge: HOME | End: 2022-07-04
Attending: HOSPITALIST
Payer: MEDICARE

## 2022-07-04 DIAGNOSIS — N39.0: Primary | ICD-10-CM

## 2022-07-04 LAB
BILIRUB UR QL CFM: NEGATIVE
CLARITY UR REFRACT.AUTO: (no result)
GLUCOSE UR QL STRIP.AUTO: NEGATIVE MG/DL
KETONES UR QL STRIP.AUTO: NEGATIVE MG/DL
NITRITE UR QL STRIP.AUTO: NEGATIVE
PH UR STRIP.AUTO: 7.5 PH (ref 5–7.5)
PROT UR STRIP.AUTO-MCNC: >=300 MG/DL
RBC # UR STRIP.AUTO: (no result) /UL
SP GR UR STRIP.AUTO: 1.02 (ref 1–1.03)
SQUAMOUS URNS QL MICRO: (no result)
UROBILINOGEN UR QL STRIP.AUTO: (no result) E.U./DL
UROBILINOGEN UR STRIP.AUTO-MCNC: NEGATIVE MG/DL
WBC # UR MANUAL: >25 /HPF (ref 0–5)

## 2022-07-04 PROCEDURE — 81001 URINALYSIS AUTO W/SCOPE: CPT

## 2022-07-04 PROCEDURE — 87086 URINE CULTURE/COLONY COUNT: CPT

## 2022-07-04 PROCEDURE — 87077 CULTURE AEROBIC IDENTIFY: CPT

## 2022-07-04 PROCEDURE — 87181 SC STD AGAR DILUTION PER AGT: CPT

## 2022-08-03 ENCOUNTER — HOSPITAL ENCOUNTER (OUTPATIENT)
Dept: HOSPITAL 76 - LAB.R | Age: 87
Discharge: HOME | End: 2022-08-03
Attending: HOSPITALIST
Payer: MEDICARE

## 2022-08-03 DIAGNOSIS — N39.0: Primary | ICD-10-CM

## 2022-08-03 LAB
CLARITY UR REFRACT.AUTO: (no result)
GLUCOSE UR QL STRIP.AUTO: NEGATIVE MG/DL
KETONES UR QL STRIP.AUTO: (no result) MG/DL
NITRITE UR QL STRIP.AUTO: POSITIVE
PH UR STRIP.AUTO: 7 PH (ref 5–7.5)
PROT UR STRIP.AUTO-MCNC: 100 MG/DL
RBC # UR STRIP.AUTO: (no result) /UL
RBC # URNS HPF: (no result) /HPF (ref 0–5)
SP GR UR STRIP.AUTO: 1.02 (ref 1–1.03)
SQUAMOUS URNS QL MICRO: (no result)
UROBILINOGEN UR QL STRIP.AUTO: (no result) E.U./DL
UROBILINOGEN UR STRIP.AUTO-MCNC: NEGATIVE MG/DL
WBC # UR MANUAL: >25 /HPF (ref 0–5)

## 2022-08-03 PROCEDURE — 87181 SC STD AGAR DILUTION PER AGT: CPT

## 2022-08-03 PROCEDURE — 87086 URINE CULTURE/COLONY COUNT: CPT

## 2022-08-03 PROCEDURE — 81001 URINALYSIS AUTO W/SCOPE: CPT

## 2022-09-20 ENCOUNTER — HOSPITAL ENCOUNTER (OUTPATIENT)
Dept: HOSPITAL 76 - LAB.R | Age: 87
Discharge: HOME | End: 2022-09-20
Attending: SKILLED NURSING FACILITY
Payer: MEDICARE

## 2022-09-20 DIAGNOSIS — N39.0: Primary | ICD-10-CM

## 2022-09-20 LAB
CLARITY UR REFRACT.AUTO: (no result)
GLUCOSE UR QL STRIP.AUTO: NEGATIVE MG/DL
KETONES UR QL STRIP.AUTO: NEGATIVE MG/DL
NITRITE UR QL STRIP.AUTO: NEGATIVE
PH UR STRIP.AUTO: 7.5 PH (ref 5–7.5)
PROT UR STRIP.AUTO-MCNC: 100 MG/DL
RBC # UR STRIP.AUTO: (no result) /UL
SP GR UR STRIP.AUTO: 1.02 (ref 1–1.03)
SQUAMOUS URNS QL MICRO: (no result)
UROBILINOGEN UR QL STRIP.AUTO: (no result) E.U./DL
UROBILINOGEN UR STRIP.AUTO-MCNC: NEGATIVE MG/DL
WBC # UR MANUAL: >25 /HPF (ref 0–5)

## 2022-09-20 PROCEDURE — 81001 URINALYSIS AUTO W/SCOPE: CPT

## 2022-09-20 PROCEDURE — 81003 URINALYSIS AUTO W/O SCOPE: CPT

## 2022-09-20 PROCEDURE — 87086 URINE CULTURE/COLONY COUNT: CPT

## 2022-10-23 ENCOUNTER — HOSPITAL ENCOUNTER (OUTPATIENT)
Dept: HOSPITAL 76 - LAB.R | Age: 87
Discharge: HOME | End: 2022-10-23
Attending: INTERNAL MEDICINE
Payer: MEDICARE

## 2022-10-23 DIAGNOSIS — N39.0: Primary | ICD-10-CM

## 2022-10-23 LAB
CLARITY UR REFRACT.AUTO: (no result)
GLUCOSE UR QL STRIP.AUTO: NEGATIVE MG/DL
KETONES UR QL STRIP.AUTO: NEGATIVE MG/DL
NITRITE UR QL STRIP.AUTO: POSITIVE
PH UR STRIP.AUTO: 7 PH (ref 5–7.5)
PROT UR STRIP.AUTO-MCNC: 30 MG/DL
RBC # UR STRIP.AUTO: (no result) /UL
RBC # URNS HPF: (no result) /HPF (ref 0–5)
SP GR UR STRIP.AUTO: 1.02 (ref 1–1.03)
SQUAMOUS URNS QL MICRO: (no result)
UROBILINOGEN UR QL STRIP.AUTO: (no result) E.U./DL
UROBILINOGEN UR STRIP.AUTO-MCNC: NEGATIVE MG/DL
WBC # UR MANUAL: >25 /HPF (ref 0–5)

## 2022-10-23 PROCEDURE — 87181 SC STD AGAR DILUTION PER AGT: CPT

## 2022-10-23 PROCEDURE — 81001 URINALYSIS AUTO W/SCOPE: CPT

## 2022-10-23 PROCEDURE — 87086 URINE CULTURE/COLONY COUNT: CPT

## 2022-11-08 ENCOUNTER — HOSPITAL ENCOUNTER (OUTPATIENT)
Dept: HOSPITAL 76 - LAB | Age: 87
Discharge: HOME | End: 2022-11-08
Attending: INTERNAL MEDICINE
Payer: MEDICARE

## 2022-11-08 DIAGNOSIS — I11.9: Primary | ICD-10-CM

## 2022-11-08 LAB
ANION GAP SERPL CALCULATED.4IONS-SCNC: 7 MMOL/L (ref 6–13)
BUN SERPL-MCNC: 17 MG/DL (ref 6–20)
CALCIUM UR-MCNC: 9.2 MG/DL (ref 8.5–10.3)
CHLORIDE SERPL-SCNC: 103 MMOL/L (ref 101–111)
CO2 SERPL-SCNC: 30 MMOL/L (ref 21–32)
CREAT SERPLBLD-SCNC: 0.5 MG/DL (ref 0.4–1)
GFRSERPLBLD MDRD-ARVRAT: 116 ML/MIN/{1.73_M2} (ref 89–?)
GLUCOSE SERPL-MCNC: 145 MG/DL (ref 70–100)
POTASSIUM SERPL-SCNC: 3.6 MMOL/L (ref 3.5–5)
SODIUM SERPLBLD-SCNC: 140 MMOL/L (ref 135–145)

## 2022-11-08 PROCEDURE — 80048 BASIC METABOLIC PNL TOTAL CA: CPT

## 2022-11-08 PROCEDURE — 36415 COLL VENOUS BLD VENIPUNCTURE: CPT

## 2022-12-28 ENCOUNTER — HOSPITAL ENCOUNTER (OUTPATIENT)
Dept: HOSPITAL 76 - LAB.R | Age: 87
Discharge: HOME | End: 2022-12-28
Attending: INTERNAL MEDICINE
Payer: MEDICARE

## 2022-12-28 DIAGNOSIS — Z20.822: ICD-10-CM

## 2022-12-28 DIAGNOSIS — R05.9: Primary | ICD-10-CM

## 2022-12-28 LAB
B PARAPERT DNA SPEC QL NAA+PROBE: NOT DETECTED
B PERT DNA SPEC QL NAA+PROBE: NOT DETECTED
C PNEUM DNA NPH QL NAA+NON-PROBE: NOT DETECTED
FLUAV RNA RESP QL NAA+PROBE: NOT DETECTED
HAEM INFLU B DNA SPEC QL NAA+PROBE: NOT DETECTED
HCOV 229E RNA SPEC QL NAA+PROBE: NOT DETECTED
HCOV HKU1 RNA UPPER RESP QL NAA+PROBE: NOT DETECTED
HCOV NL63 RNA ASPIRATE QL NAA+PROBE: NOT DETECTED
HCOV OC43 RNA SPEC QL NAA+PROBE: NOT DETECTED
HMPV AG SPEC QL: NOT DETECTED
HPIV1 RNA NPH QL NAA+PROBE: NOT DETECTED
HPIV2 SPEC QL CULT: NOT DETECTED
HPIV3 AB TITR SER CF: NOT DETECTED {TITER}
HPIV4 RNA SPEC QL NAA+PROBE: NOT DETECTED
M PNEUMO DNA SPEC QL NAA+PROBE: NOT DETECTED
RSV RNA RESP QL NAA+PROBE: DETECTED
RV+EV RNA SPEC QL NAA+PROBE: NOT DETECTED
SARS-COV-2 RNA PNL SPEC NAA+PROBE: NOT DETECTED

## 2022-12-28 PROCEDURE — 87633 RESP VIRUS 12-25 TARGETS: CPT

## 2023-01-10 ENCOUNTER — HOSPITAL ENCOUNTER (INPATIENT)
Dept: HOSPITAL 76 - ED | Age: 88
LOS: 7 days | Discharge: HOME | DRG: 871 | End: 2023-01-17
Attending: SPECIALIST | Admitting: INTERNAL MEDICINE
Payer: MEDICARE

## 2023-01-10 ENCOUNTER — HOSPITAL ENCOUNTER (OUTPATIENT)
Dept: HOSPITAL 76 - EMS | Age: 88
Discharge: ANOTHER HOSPITAL | End: 2023-01-10
Payer: MEDICARE

## 2023-01-10 DIAGNOSIS — Z66: ICD-10-CM

## 2023-01-10 DIAGNOSIS — Z88.8: ICD-10-CM

## 2023-01-10 DIAGNOSIS — A41.9: Primary | ICD-10-CM

## 2023-01-10 DIAGNOSIS — A41.51: ICD-10-CM

## 2023-01-10 DIAGNOSIS — Z79.82: ICD-10-CM

## 2023-01-10 DIAGNOSIS — Z88.0: ICD-10-CM

## 2023-01-10 DIAGNOSIS — J06.9: ICD-10-CM

## 2023-01-10 DIAGNOSIS — J22: ICD-10-CM

## 2023-01-10 DIAGNOSIS — G93.41: ICD-10-CM

## 2023-01-10 DIAGNOSIS — R41.82: Primary | ICD-10-CM

## 2023-01-10 DIAGNOSIS — I48.20: ICD-10-CM

## 2023-01-10 DIAGNOSIS — I10: ICD-10-CM

## 2023-01-10 DIAGNOSIS — N39.0: ICD-10-CM

## 2023-01-10 DIAGNOSIS — G20: ICD-10-CM

## 2023-01-10 DIAGNOSIS — Z74.01: ICD-10-CM

## 2023-01-10 DIAGNOSIS — M19.90: ICD-10-CM
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
## 2023-01-10 DIAGNOSIS — Z88.2: ICD-10-CM

## 2023-01-10 DIAGNOSIS — M81.0: ICD-10-CM

## 2023-01-10 DIAGNOSIS — L89.152: ICD-10-CM

## 2023-01-10 DIAGNOSIS — F02.818: ICD-10-CM

## 2023-01-10 DIAGNOSIS — Z20.822: ICD-10-CM

## 2023-01-10 DIAGNOSIS — Z79.890: ICD-10-CM

## 2023-01-10 DIAGNOSIS — R65.21: ICD-10-CM

## 2023-01-10 DIAGNOSIS — B97.4: ICD-10-CM

## 2023-01-10 DIAGNOSIS — F02.82: ICD-10-CM

## 2023-01-10 DIAGNOSIS — R63.0: ICD-10-CM

## 2023-01-10 DIAGNOSIS — I95.9: ICD-10-CM

## 2023-01-10 DIAGNOSIS — R64: ICD-10-CM

## 2023-01-10 DIAGNOSIS — N30.00: ICD-10-CM

## 2023-01-10 DIAGNOSIS — J18.9: ICD-10-CM

## 2023-01-10 DIAGNOSIS — Q21.12: ICD-10-CM

## 2023-01-10 DIAGNOSIS — Z79.899: ICD-10-CM

## 2023-01-10 DIAGNOSIS — E78.00: ICD-10-CM

## 2023-01-10 DIAGNOSIS — Z99.3: ICD-10-CM

## 2023-01-10 DIAGNOSIS — Z88.5: ICD-10-CM

## 2023-01-10 DIAGNOSIS — I25.2: ICD-10-CM

## 2023-01-10 LAB
ALBUMIN DIAFP-MCNC: 2.7 G/DL (ref 3.2–5.5)
ALBUMIN/GLOB SERPL: 1 {RATIO} (ref 1–2.2)
ALP SERPL-CCNC: 56 IU/L (ref 42–121)
ALT SERPL W P-5'-P-CCNC: < 10 IU/L (ref 10–60)
ANION GAP SERPL CALCULATED.4IONS-SCNC: 11 MMOL/L (ref 6–13)
APTT PPP: 28.4 SECS (ref 24.9–33.3)
AST SERPL W P-5'-P-CCNC: 14 IU/L (ref 10–42)
B PARAPERT DNA SPEC QL NAA+PROBE: NOT DETECTED
B PERT DNA SPEC QL NAA+PROBE: NOT DETECTED
BASOPHILS # BLD MANUAL: 0 10^3/UL (ref 0–0.1)
BASOPHILS NFR BLD AUTO: 0.4 %
BILIRUB BLD-MCNC: 1.1 MG/DL (ref 0.2–1)
BILIRUB UR QL CFM: NEGATIVE
BUN SERPL-MCNC: 33 MG/DL (ref 6–20)
C PNEUM DNA NPH QL NAA+NON-PROBE: NOT DETECTED
CALCIUM UR-MCNC: 9.2 MG/DL (ref 8.5–10.3)
CHLORIDE SERPL-SCNC: 100 MMOL/L (ref 101–111)
CLARITY UR REFRACT.AUTO: (no result)
CO2 SERPL-SCNC: 31 MMOL/L (ref 21–32)
CREAT SERPLBLD-SCNC: 1.1 MG/DL (ref 0.4–1)
EOSINOPHIL # BLD MANUAL: 0 10^3/UL (ref 0–0.7)
EOSINOPHIL NFR BLD AUTO: 0.5 %
ERYTHROCYTE [DISTWIDTH] IN BLOOD BY AUTOMATED COUNT: 14.5 % (ref 12–15)
FLUAV RNA RESP QL NAA+PROBE: NOT DETECTED
GFRSERPLBLD MDRD-ARVRAT: 47 ML/MIN/{1.73_M2} (ref 89–?)
GLOBULIN SER-MCNC: 2.8 G/DL (ref 2.1–4.2)
GLUCOSE SERPL-MCNC: 109 MG/DL (ref 70–100)
GLUCOSE UR QL STRIP.AUTO: NEGATIVE MG/DL
HAEM INFLU B DNA SPEC QL NAA+PROBE: NOT DETECTED
HCOV 229E RNA SPEC QL NAA+PROBE: NOT DETECTED
HCOV HKU1 RNA UPPER RESP QL NAA+PROBE: NOT DETECTED
HCOV NL63 RNA ASPIRATE QL NAA+PROBE: NOT DETECTED
HCOV OC43 RNA SPEC QL NAA+PROBE: NOT DETECTED
HCT VFR BLD AUTO: 33 % (ref 37–47)
HGB UR QL STRIP: 10.1 G/DL (ref 12–16)
HMPV AG SPEC QL: NOT DETECTED
HPIV1 RNA NPH QL NAA+PROBE: NOT DETECTED
HPIV2 SPEC QL CULT: NOT DETECTED
HPIV3 AB TITR SER CF: NOT DETECTED {TITER}
HPIV4 RNA SPEC QL NAA+PROBE: NOT DETECTED
INR PPP: 1.3 (ref 0.8–1.2)
KETONES UR QL STRIP.AUTO: (no result) MG/DL
LIPASE SERPL-CCNC: 21 U/L (ref 22–51)
LYMPH ABN NFR BLD MANUAL: 0 %
LYMPHOBLASTS # BLD: 5 %
LYMPHOCYTES # BLD MANUAL: 1 10^3/UL (ref 1.5–3.5)
LYMPHOCYTES NFR BLD AUTO: 2.8 %
M PNEUMO DNA SPEC QL NAA+PROBE: NOT DETECTED
MANUAL DIF COMMENT BLD-IMP: (no result)
MCH RBC QN AUTO: 25.7 PG (ref 27–31)
MCHC RBC AUTO-ENTMCNC: 30.6 G/DL (ref 32–36)
MCV RBC AUTO: 84 FL (ref 81–99)
MONOCYTES # BLD MANUAL: 0.8 10^3/UL (ref 0–1)
MONOCYTES NFR BLD AUTO: 5.1 %
NEUTROPHILS # SNV AUTO: 20.5 X10^3/UL (ref 4.8–10.8)
NEUTROPHILS NFR BLD AUTO: 90.5 %
NEUTROPHILS NFR BLD MANUAL: 18.7 10^3/UL (ref 1.5–6.6)
NEUTS BAND NFR BLD: 10 %
NITRITE UR QL STRIP.AUTO: NEGATIVE
PDW BLD AUTO: 10.5 FL (ref 7.9–10.8)
PH UR STRIP.AUTO: 7.5 PH (ref 5–7.5)
PLAT MORPH BLD: (no result)
PLATELET # BLD: 208 10^3/UL (ref 130–450)
PLATELET BLD QL SMEAR: (no result)
POTASSIUM SERPL-SCNC: 3.8 MMOL/L (ref 3.5–5)
PROT SPEC-MCNC: 5.5 G/DL (ref 6.7–8.2)
PROT UR STRIP.AUTO-MCNC: >=300 MG/DL
PROTHROM ACT/NOR PPP: 14.5 SECS (ref 9.9–12.6)
RBC # UR STRIP.AUTO: (no result) /UL
RBC MAR: 3.93 10^6/UL (ref 4.2–5.4)
RBC MORPH BLD: (no result)
RSV RNA RESP QL NAA+PROBE: DETECTED
RV+EV RNA SPEC QL NAA+PROBE: NOT DETECTED
SARS-COV-2 RNA PNL SPEC NAA+PROBE: NOT DETECTED
SODIUM SERPLBLD-SCNC: 142 MMOL/L (ref 135–145)
SP GR UR STRIP.AUTO: 1.02 (ref 1–1.03)
SQUAMOUS URNS QL MICRO: (no result)
UROBILINOGEN UR QL STRIP.AUTO: (no result) E.U./DL
UROBILINOGEN UR STRIP.AUTO-MCNC: NEGATIVE MG/DL
WBC # UR MANUAL: >25 /HPF (ref 0–5)
WBC MORPH BLD: (no result)

## 2023-01-10 PROCEDURE — 85730 THROMBOPLASTIN TIME PARTIAL: CPT

## 2023-01-10 PROCEDURE — 85610 PROTHROMBIN TIME: CPT

## 2023-01-10 PROCEDURE — 83690 ASSAY OF LIPASE: CPT

## 2023-01-10 PROCEDURE — 71045 X-RAY EXAM CHEST 1 VIEW: CPT

## 2023-01-10 PROCEDURE — 96374 THER/PROPH/DIAG INJ IV PUSH: CPT

## 2023-01-10 PROCEDURE — 80048 BASIC METABOLIC PNL TOTAL CA: CPT

## 2023-01-10 PROCEDURE — 87086 URINE CULTURE/COLONY COUNT: CPT

## 2023-01-10 PROCEDURE — 84484 ASSAY OF TROPONIN QUANT: CPT

## 2023-01-10 PROCEDURE — 81003 URINALYSIS AUTO W/O SCOPE: CPT

## 2023-01-10 PROCEDURE — 96361 HYDRATE IV INFUSION ADD-ON: CPT

## 2023-01-10 PROCEDURE — 87635 SARS-COV-2 COVID-19 AMP PRB: CPT

## 2023-01-10 PROCEDURE — 51701 INSERT BLADDER CATHETER: CPT

## 2023-01-10 PROCEDURE — 87040 BLOOD CULTURE FOR BACTERIA: CPT

## 2023-01-10 PROCEDURE — 93005 ELECTROCARDIOGRAM TRACING: CPT

## 2023-01-10 PROCEDURE — 83605 ASSAY OF LACTIC ACID: CPT

## 2023-01-10 PROCEDURE — 93306 TTE W/DOPPLER COMPLETE: CPT

## 2023-01-10 PROCEDURE — 99285 EMERGENCY DEPT VISIT HI MDM: CPT

## 2023-01-10 PROCEDURE — 36415 COLL VENOUS BLD VENIPUNCTURE: CPT

## 2023-01-10 PROCEDURE — 81001 URINALYSIS AUTO W/SCOPE: CPT

## 2023-01-10 PROCEDURE — 87633 RESP VIRUS 12-25 TARGETS: CPT

## 2023-01-10 PROCEDURE — 83735 ASSAY OF MAGNESIUM: CPT

## 2023-01-10 PROCEDURE — 80053 COMPREHEN METABOLIC PANEL: CPT

## 2023-01-10 PROCEDURE — 85025 COMPLETE CBC W/AUTO DIFF WBC: CPT

## 2023-01-10 PROCEDURE — 87181 SC STD AGAR DILUTION PER AGT: CPT

## 2023-01-10 RX ADMIN — SODIUM CHLORIDE SCH: 9 INJECTION, SOLUTION INTRAVENOUS at 22:57

## 2023-01-10 NOTE — XRAY REPORT
PROCEDURE:  Chest 1 View X-Ray

 

INDICATIONS:  cough

 

TECHNIQUE:  One view of the chest was acquired.  

 

COMPARISON:  None.

 

FINDINGS:  

 

Surgical changes and devices:  None.  

 

Lungs and pleura:  Hyperinflation and chronic interstitial changes

 

Mediastinum:  Mediastinal contours appear normal.  Heart size is normal.  

 

Bones and chest wall:  Osteopenia and old left proximal humeral fracture

 

 

IMPRESSION:  

 

No acute cardiopulmonary findings

 

Reviewed by: Breezy Daniel MD on 1/10/2023 5:34 PM AK

Approved by: Breezy Daniel MD on 1/10/2023 5:34 PM AK

 

 

Station ID:  [id number]

## 2023-01-10 NOTE — ED PHYSICIAN DOCUMENTATION
History of Present Illness





- Stated complaint


Stated Complaint: MALAISE





- Chief complaint


Chief Complaint: Neuro





- History obtained from


History obtained from: EMS





- History of Present Illness


Timing: Today


Pain level max: 0


Pain level now: 0





- Additonal information


Additional information: 





Patient is an 89-year-old female brought in from [facility].  Per EMS

they state that she has had decreased responsiveness and decreased appetite over

the past several days.  She has been coughing as well. She recently tested 

positive for RSV.  She does not use oxygen at home.  EMS states that upon their 

arrival her blood pressure systolic was in the 60s.  They administered IV fluids

on route to the hospital.  Patient is DNR, selective interventions. She is 

nonambulatory at baseline per EMS.  She is wheeled around in a wheelchair.





Review of Systems


Unable to obtain: AMS





PD PAST MEDICAL HISTORY





- Past Medical History


Past Medical History: Yes


Cardiovascular: Hypertension, High cholesterol


Respiratory: None


Endocrine/Autoimmune: None


Psych: None


Musculoskeletal: Osteoarthritis, Osteoporosis, Fatigue





- Past Surgical History


/GYN: Hysterectomy





- Present Medications


Home Medications: 


                                Ambulatory Orders











 Medication  Instructions  Recorded  Confirmed


 


Aspirin 325 mg PO DAILY 04/30/17 04/30/17


 


Carbidopa/Levodopa [Carbidopa-Levo 25 - 100 mg PO QID 04/30/17 06/30/17





ER  Tab]   


 


Cholecalciferol (Vitamin D3) 2,000 mg PO DAILY 04/30/17 04/30/17





[Vitamin D3]   


 


Cyanocobalamin (Vitamin B-12) 1 tab PO DAILY 04/30/17 04/30/17





[Vitamin B-12]   


 


Estradiol [Estrace] 0.1 mg PO 04/30/17 


 


Ibuprofen [Motrin] 600 mg PO Q6H 04/30/17 04/30/17


 


Ipratropium Bromide BID 04/30/17 


 


Mirtazapine 7.5 mg PO DAILY 04/30/17 04/30/17


 


Pramipexole [Mirapex] 1 tab PO DAILY 04/30/17 04/30/17


 


Tamsulosin [Flomax] 1 tab PO DAILY 04/30/17 04/30/17


 


hydroCHLOROthiazide 12.5 mg PO DAILY 04/30/17 04/30/17





[Hydrochlorothiazide]   


 


Oxycodone HCl/Acetaminophen 1 - 2 tab PO Q4H PRN #20 tablet 06/30/17 





[Percocet 5-325 mg Tablet]   


 


cephALEXin [Keflex] 500 mg PO Q6H #20 cap 11/09/21 














- Allergies


Allergies/Adverse Reactions: 


                                    Allergies











Allergy/AdvReac Type Severity Reaction Status Date / Time


 


alendronate sodium Allergy  Unknown Verified 11/09/21 19:51





[From Fosamax]     


 


amoxicillin Allergy  Unknown Verified 11/09/21 19:51


 


oxycodone Allergy  Unknown Verified 11/09/21 19:51


 


risedronate sodium Allergy  Unknown Verified 11/09/21 19:51


 


Sulfa (Sulfonamide Allergy  Rash Verified 11/09/21 19:51





Antibiotics)     


 


tramadol Allergy  Unknown Verified 11/09/21 19:51














- Social History


Does the pt smoke?: No


Smoking Status: Never smoker





- Immunizations


Immunizations are current?: Yes





PD ED PE NORMAL





- Vitals


Vital signs reviewed: Yes





- General


General: Other (Drowsy, but arousable, will only speak 1 word quietly at a time.

 Appears confused. Cachectic.)





- HEENT


HEENT: PERRL, Other (dry lips and tongue)





- Neck


Neck: Supple, no meningeal sign





- Cardiac


Cardiac: RRR





- Respiratory


Respiratory: No respiratory distress, Clear bilaterally





- Abdomen


Abdomen: Soft, Non tender, Non distended





- Back


Back: No spinal TTP





- Derm


Derm: Warm and dry, No rash





- Extremities


Extremities: No tenderness to palpate, No edema





- Neuro


Neuro: No motor deficit, No sensory deficit





Results





- Vitals


Vitals: 


                               Vital Signs - 24 hr











  01/10/23 01/10/23 01/10/23





  17:26 17:58 18:28


 


Temperature 37.1 C  


 


Heart Rate 96 90 86


 


Respiratory 31 H 26 H 23





Rate   


 


Blood Pressure 82/60 L 88/61 L 72/50 L


 


O2 Saturation 96 94 97














  01/10/23 01/10/23





  19:00 19:37


 


Temperature  


 


Heart Rate 96 91


 


Respiratory 28 H 26 H





Rate  


 


Blood Pressure 80/58 L 91/64


 


O2 Saturation 99 99








                                     Oxygen











O2 Source                      Room air

















- Labs


Labs: 


                                Laboratory Tests











  01/10/23 01/10/23 01/10/23





  17:41 17:46 17:56


 


WBC    20.5 H


 


RBC    3.93 L


 


Hgb    10.1 L


 


Hct    33.0 L


 


MCV    84.0


 


MCH    25.7 L


 


MCHC    30.6 L


 


RDW    14.5


 


Plt Count    208


 


MPV    10.5


 


Neut # (Auto)    Not Reportable


 


Lymph # (Auto)    Not Reportable


 


Mono # (Auto)    Not Reportable


 


Eos # (Auto)    Not Reportable


 


Baso # (Auto)    Not Reportable


 


Absolute Nucleated RBC    Not Reportable


 


Total Counted    100


 


Band Neuts % (Manual)    10


 


Abnorm Lymph % (Manual)    0


 


Nucleated RBC %    Not Reportable


 


Neutrophils # (Manual)    18.7 H


 


Lymphocytes # (Manual)    1.0 L


 


Monocytes # (Manual)    0.8


 


Eosinophils # (Manual)    0.0


 


Basophils # (Manual)    0.0


 


Differential Comment    MANUAL DIFFERENTIAL


 


WBC Morphology    1+ TOXIC GRANULATION


 


Platelet Estimate    NORMAL (130-450,000)


 


Platelet Morphology    NORMAL APPEARANCE


 


RBC Morph Micro Appear    NORMAL APPEARANCE


 


PT   


 


INR   


 


APTT   


 


Sodium   


 


Potassium   


 


Chloride   


 


Carbon Dioxide   


 


Anion Gap   


 


BUN   


 


Creatinine   


 


Estimated GFR (MDRD)   


 


Glucose   


 


Lactic Acid   


 


Calcium   


 


Total Bilirubin   


 


AST   


 


ALT   


 


Alkaline Phosphatase   


 


Total Protein   


 


Albumin   


 


Globulin   


 


Albumin/Globulin Ratio   


 


Lipase   


 


Urine Color  YELLOW  


 


Urine Clarity  TURBID  


 


Urine pH  7.5  


 


Ur Specific Gravity  1.020  


 


Urine Protein  >=300 H  


 


Urine Glucose (UA)  NEGATIVE  


 


Urine Ketones  TRACE  


 


Urine Occult Blood  LARGE H  


 


Urine Nitrite  NEGATIVE  


 


Urine Bilirubin  NEGATIVE  


 


Urine Urobilinogen  1 (NORMAL)  


 


Ur Leukocyte Esterase  SMALL H  


 


Urine RBC  11-25 H  


 


Urine WBC  >25 H  


 


Ur Squamous Epith Cells  RARE Squamous  


 


Urine Bacteria  Many H  


 


Ur Microscopic Review  INDICATED  


 


Urine Culture Comments  INDICATED  


 


Nasal Adenovirus (PCR)   NOT DETECTED 


 


Nasal B. parapertussis DNA (PCR)   NOT DETECTED 


 


Nasal Coronavir 229E PCR   NOT DETECTED 


 


Nasal Coronavir HKU1 PCR   NOT DETECTED 


 


Nasal Coronavir NL63 PCR   NOT DETECTED 


 


Nasal Coronavir OC43 PCR   NOT DETECTED 


 


Nasal Enterovir/Rhinovir PCR   NOT DETECTED 


 


Nasal Influenza B PCR   NOT DETECTED 


 


Nasal Influenza A PCR   NOT DETECTED 


 


Nasal Parainfluen 1 PCR   NOT DETECTED 


 


Nasal Parainfluen 2 PCR   NOT DETECTED 


 


Nasal Parainfluen 3 PCR   NOT DETECTED 


 


Nasal Parainfluen 4 PCR   NOT DETECTED 


 


Nasal RSV (PCR)   DETECTED A 


 


Nasal B.pertussis DNA PCR   NOT DETECTED 


 


Nasal C.pneumoniae (PCR)   NOT DETECTED 


 


Yousif Human Metapneumo PCR   NOT DETECTED 


 


Nasal M.pneumoniae (PCR)   NOT DETECTED 


 


Nasal SARS-CoV-2 (PCR)   NOT DETECTED 














  01/10/23 01/10/23 01/10/23





  17:56 17:56 17:56


 


WBC   


 


RBC   


 


Hgb   


 


Hct   


 


MCV   


 


MCH   


 


MCHC   


 


RDW   


 


Plt Count   


 


MPV   


 


Neut # (Auto)   


 


Lymph # (Auto)   


 


Mono # (Auto)   


 


Eos # (Auto)   


 


Baso # (Auto)   


 


Absolute Nucleated RBC   


 


Total Counted   


 


Band Neuts % (Manual)   


 


Abnorm Lymph % (Manual)   


 


Nucleated RBC %   


 


Neutrophils # (Manual)   


 


Lymphocytes # (Manual)   


 


Monocytes # (Manual)   


 


Eosinophils # (Manual)   


 


Basophils # (Manual)   


 


Differential Comment   


 


WBC Morphology   


 


Platelet Estimate   


 


Platelet Morphology   


 


RBC Morph Micro Appear   


 


PT  14.5 H  


 


INR  1.3 H  


 


APTT  28.4  


 


Sodium   142 


 


Potassium   3.8 


 


Chloride   100 L 


 


Carbon Dioxide   31 


 


Anion Gap   11.0 


 


BUN   33 H 


 


Creatinine   1.1 H 


 


Estimated GFR (MDRD)   47 L 


 


Glucose   109 H 


 


Lactic Acid    2.6 H


 


Calcium   9.2 


 


Total Bilirubin   1.1 H 


 


AST   14 


 


ALT   < 10 L 


 


Alkaline Phosphatase   56 


 


Total Protein   5.5 L 


 


Albumin   2.7 L 


 


Globulin   2.8 


 


Albumin/Globulin Ratio   1.0 


 


Lipase   21 L 


 


Urine Color   


 


Urine Clarity   


 


Urine pH   


 


Ur Specific Gravity   


 


Urine Protein   


 


Urine Glucose (UA)   


 


Urine Ketones   


 


Urine Occult Blood   


 


Urine Nitrite   


 


Urine Bilirubin   


 


Urine Urobilinogen   


 


Ur Leukocyte Esterase   


 


Urine RBC   


 


Urine WBC   


 


Ur Squamous Epith Cells   


 


Urine Bacteria   


 


Ur Microscopic Review   


 


Urine Culture Comments   


 


Nasal Adenovirus (PCR)   


 


Nasal B. parapertussis DNA (PCR)   


 


Nasal Coronavir 229E PCR   


 


Nasal Coronavir HKU1 PCR   


 


Nasal Coronavir NL63 PCR   


 


Nasal Coronavir OC43 PCR   


 


Nasal Enterovir/Rhinovir PCR   


 


Nasal Influenza B PCR   


 


Nasal Influenza A PCR   


 


Nasal Parainfluen 1 PCR   


 


Nasal Parainfluen 2 PCR   


 


Nasal Parainfluen 3 PCR   


 


Nasal Parainfluen 4 PCR   


 


Nasal RSV (PCR)   


 


Nasal B.pertussis DNA PCR   


 


Nasal C.pneumoniae (PCR)   


 


Yousif Human Metapneumo PCR   


 


Nasal M.pneumoniae (PCR)   


 


Nasal SARS-CoV-2 (PCR)   














- Rads (name of study)


  ** cxr


Radiology: Final report received, See rad report





PD Medical Decision Making





- ED course


Complexity details: reviewed results, re-evaluated patient, considered 

differential, d/w patient, d/w consultant


ED course: 





89-year-old female with hypotension and apparent urosepsis.  Given IV Rocephin, 

IV fluids.  Blood pressure improved.  She is DNR, selective treatments.  She has

a white count of 20,000.  18,000 neutrophils.  Her lactic acid is 2.6, 

creatinine 1.1 urinalysis consistent with UTI.  Respiratory PCR positive for 

RSV.  Chest x-ray does not show any evidence of pneumonia.  We will admit the 

patient for continued IV fluids and antibiotics.  Discussed the case with the 

hospitalist, Dr. Luz, who accepts.





This document was made in part using voice recognition software. While efforts 

are made to proofread this document, sound alike and grammatical errors may 

occur.





Departure





- Departure


Disposition: [facility] DC/Xfer


Clinical Impression: 


 RSV (respiratory syncytial virus infection)





Sepsis


Qualifiers:


 Sepsis type: sepsis due to unspecified organism Sepsis acute organ dysfunction 

status: unspecified Qualified Code(s): A41.9 - Sepsis, unspecified organism





UTI (urinary tract infection)


Qualifiers:


 Urinary tract infection type: acute cystitis Hematuria presence: without 

hematuria Qualified Code(s): N30.00 - Acute cystitis without hematuria





Hypotension


Qualifiers:


 Hypotension type: unspecified hypotension type Qualified Code(s): I95.9 - 

Hypotension, unspecified





Condition: Stable


Discharge Date/Time: 01/10/23 22:23

## 2023-01-10 NOTE — HISTORY & PHYSICAL EXAMINATION
Chief Complaint





- Chief Complaint


Chief Complaint: Weakness, AMS





History of Present Illness





- History of Present Illness


HPI Comment/Other: 





83 y old female with PMH HTN, HLP BIBA fron nursing home due to weakness and 

AMS. Pt is wheelchair bound at baseline. Pt is unable to porovide history at 

this time so most of history is from ER physician and ER record.Per EMS, her IVANNA 

was in 60`s systolic. She was given IVF. Pt is alert and awake at the time of my

examination.Able to move all extremities. Pt also has cough





On Presentaion, pt was quite hypotensive. She was given  cc IV bolus X 1. 

She is currently getting NS @ 150 cc/h. Her systolic BP is 91. 





Labs showed WBC 20, lactic acid 2.2. 





UA showed UTI





RSV positive





CXR showed no acute abnormalities





Pt is DNR





Pt is being admitted due to Severe sepsis, UTI, AMS, weakness, RSV infection.





History





- Past Medical History


Cardiovascular: reports: Hypertension, High cholesterol


Respiratory: reports: None


Endocrine/Autoimmune: reports: None


Psych: reports: None


Musculoskeletal: reports: Osteoarthritis, Osteoporosis, Fatigue


MRSA Hx?: No





- Past Surgical History


/GYN: reports: Hysterectomy





Meds/Allgy





- Home Medications


Home Medications: 


                                Ambulatory Orders











 Medication  Instructions  Recorded  Confirmed


 


Aspirin 325 mg PO DAILY 04/30/17 04/30/17


 


Carbidopa/Levodopa [Carbidopa-Levo 25 - 100 mg PO QID 04/30/17 06/30/17





ER  Tab]   


 


Cholecalciferol (Vitamin D3) 2,000 mg PO DAILY 04/30/17 04/30/17





[Vitamin D3]   


 


Cyanocobalamin (Vitamin B-12) 1 tab PO DAILY 04/30/17 04/30/17





[Vitamin B-12]   


 


Estradiol [Estrace] 0.1 mg PO 04/30/17 


 


Ibuprofen [Motrin] 600 mg PO Q6H 04/30/17 04/30/17


 


Ipratropium Bromide BID 04/30/17 


 


Mirtazapine 7.5 mg PO DAILY 04/30/17 04/30/17


 


Pramipexole [Mirapex] 1 tab PO DAILY 04/30/17 04/30/17


 


Tamsulosin [Flomax] 1 tab PO DAILY 04/30/17 04/30/17


 


hydroCHLOROthiazide 12.5 mg PO DAILY 04/30/17 04/30/17





[Hydrochlorothiazide]   


 


Oxycodone HCl/Acetaminophen 1 - 2 tab PO Q4H PRN #20 tablet 06/30/17 





[Percocet 5-325 mg Tablet]   


 


cephALEXin [Keflex] 500 mg PO Q6H #20 cap 11/09/21 














- Allergies


Allergies/Adverse Reactions: 


                                    Allergies











Allergy/AdvReac Type Severity Reaction Status Date / Time


 


alendronate sodium Allergy  Unknown Verified 11/09/21 19:51





[From Fosamax]     


 


amoxicillin Allergy  Unknown Verified 11/09/21 19:51


 


oxycodone Allergy  Unknown Verified 11/09/21 19:51


 


risedronate sodium Allergy  Unknown Verified 11/09/21 19:51


 


Sulfa (Sulfonamide Allergy  Rash Verified 11/09/21 19:51





Antibiotics)     


 


tramadol Allergy  Unknown Verified 11/09/21 19:51














Review of Systems





- Constitutional


Constitutional: reports: Weakness





- Respiratory


Respiratory: reports: Cough





- Other Findings


Other Findings: 





Unable to obtain due to AMS





Exam





- Vital Signs


Vital Signs: 


                                Vital Signs x48h











  Temp Pulse Resp BP Pulse Ox


 


 01/10/23 19:37   91  26 H  91/64  99


 


 01/10/23 19:00   96  28 H  80/58 L  99


 


 01/10/23 18:28   86  23  72/50 L  97


 


 01/10/23 17:58   90  26 H  88/61 L  94


 


 01/10/23 17:26  37.1 C  96  31 H  82/60 L  96














- Physical Exam


General Appearance: positive: No acute distress, Alert


Eyes Bilateral: positive: Normal inspection


ENT: positive: ENT inspection nml


Neck: positive: Nml inspection


Respiratory: positive: No respiratory distress, Breath sounds nml


Cardiovascular: positive: Regular rate & rhythm


Abdomen: positive: Non-tender, Nml bowel sounds


Skin: positive: No rash


Extremities: positive: No pedal edema


Neurologic/Psychiatric: positive: Motor nml, Disoriented to place, Disoriented 

to time, Weakness





Conclusion/Plan





- Lab Results


Fish Bones: 


                                 01/10/23 17:56





                                 01/10/23 17:56





- Other


Other Results/Comments: 





A:


Severe sepsis


UTI


AMS


Weakness


RSV infection


Hypotension


Leukocytosis


HTN


HLP


DNR status





Plan;





Admit in med surg 


Follow cultures


NS @ 125 cc/h


Start Rocephin 1 gram iv daily


Seriel lactic acid


Repeat CBC, BMP in am


NPO


Swallow  screen by RN


Monitor mental status


Neuro checks


Supportive care








DVT prophylaxic: SCD





Code status: DNR





Pt is admitted as inpatientas more than 2 midnight stay is expected

## 2023-01-11 LAB
ANION GAP SERPL CALCULATED.4IONS-SCNC: 7 MMOL/L (ref 6–13)
BASOPHILS NFR BLD AUTO: 0 10^3/UL (ref 0–0.1)
BASOPHILS NFR BLD AUTO: 0.3 %
BUN SERPL-MCNC: 34 MG/DL (ref 6–20)
CALCIUM UR-MCNC: 8.3 MG/DL (ref 8.5–10.3)
CHLORIDE SERPL-SCNC: 108 MMOL/L (ref 101–111)
CO2 SERPL-SCNC: 27 MMOL/L (ref 21–32)
CREAT SERPLBLD-SCNC: 0.6 MG/DL (ref 0.4–1)
EOSINOPHIL # BLD AUTO: 0 10^3/UL (ref 0–0.7)
EOSINOPHIL NFR BLD AUTO: 0 %
ERYTHROCYTE [DISTWIDTH] IN BLOOD BY AUTOMATED COUNT: 14.9 % (ref 12–15)
GFRSERPLBLD MDRD-ARVRAT: 94 ML/MIN/{1.73_M2} (ref 89–?)
GLUCOSE SERPL-MCNC: 81 MG/DL (ref 70–100)
HCT VFR BLD AUTO: 29.2 % (ref 37–47)
HGB UR QL STRIP: 8.8 G/DL (ref 12–16)
LYMPHOCYTES # SPEC AUTO: 0.6 10^3/UL (ref 1.5–3.5)
LYMPHOCYTES NFR BLD AUTO: 7.4 %
MCH RBC QN AUTO: 25.3 PG (ref 27–31)
MCHC RBC AUTO-ENTMCNC: 30.1 G/DL (ref 32–36)
MCV RBC AUTO: 83.9 FL (ref 81–99)
MONOCYTES # BLD AUTO: 0.2 10^3/UL (ref 0–1)
MONOCYTES NFR BLD AUTO: 2.1 %
NEUTROPHILS # BLD AUTO: 7 10^3/UL (ref 1.5–6.6)
NEUTROPHILS # SNV AUTO: 7.8 X10^3/UL (ref 4.8–10.8)
NEUTROPHILS NFR BLD AUTO: 89.9 %
NRBC # BLD AUTO: 0 /100WBC
NRBC # BLD AUTO: 0 X10^3/UL
PDW BLD AUTO: 9.9 FL (ref 7.9–10.8)
PLATELET # BLD: 114 10^3/UL (ref 130–450)
POTASSIUM SERPL-SCNC: 3.1 MMOL/L (ref 3.5–5)
RBC MAR: 3.48 10^6/UL (ref 4.2–5.4)
SODIUM SERPLBLD-SCNC: 142 MMOL/L (ref 135–145)

## 2023-01-11 RX ADMIN — SODIUM CHLORIDE, PRESERVATIVE FREE SCH ML: 5 INJECTION INTRAVENOUS at 01:39

## 2023-01-11 RX ADMIN — ACETAMINOPHEN PRN MG: 325 TABLET ORAL at 11:56

## 2023-01-11 RX ADMIN — SODIUM CHLORIDE SCH MLS/HR: 9 INJECTION, SOLUTION INTRAVENOUS at 05:41

## 2023-01-11 RX ADMIN — DEXTROSE MONOHYDRATE SCH MLS/HR: 50 INJECTION, SOLUTION INTRAVENOUS at 09:10

## 2023-01-11 RX ADMIN — DILTIAZEM HYDROCHLORIDE SCH: 120 CAPSULE, COATED, EXTENDED RELEASE ORAL at 21:29

## 2023-01-11 RX ADMIN — CARBIDOPA AND LEVODOPA SCH TAB: 25; 100 TABLET ORAL at 17:17

## 2023-01-11 RX ADMIN — CARBIDOPA AND LEVODOPA SCH TAB: 25; 100 TABLET, EXTENDED RELEASE ORAL at 14:12

## 2023-01-11 RX ADMIN — SODIUM CHLORIDE, PRESERVATIVE FREE SCH ML: 5 INJECTION INTRAVENOUS at 09:10

## 2023-01-11 RX ADMIN — ACETAMINOPHEN PRN MG: 325 TABLET ORAL at 18:41

## 2023-01-11 RX ADMIN — SODIUM CHLORIDE, PRESERVATIVE FREE SCH ML: 5 INJECTION INTRAVENOUS at 16:54

## 2023-01-11 RX ADMIN — CARBIDOPA AND LEVODOPA SCH TAB: 25; 100 TABLET ORAL at 21:39

## 2023-01-11 RX ADMIN — CARBIDOPA AND LEVODOPA SCH TAB: 25; 100 TABLET, EXTENDED RELEASE ORAL at 21:28

## 2023-01-11 RX ADMIN — SODIUM CHLORIDE, PRESERVATIVE FREE SCH: 5 INJECTION INTRAVENOUS at 23:58

## 2023-01-11 RX ADMIN — SODIUM CHLORIDE SCH MLS/HR: 9 INJECTION, SOLUTION INTRAVENOUS at 13:52

## 2023-01-11 RX ADMIN — SODIUM CHLORIDE SCH MLS/HR: 9 INJECTION, SOLUTION INTRAVENOUS at 22:57

## 2023-01-11 RX ADMIN — METOPROLOL SUCCINATE SCH: 25 TABLET, EXTENDED RELEASE ORAL at 21:29

## 2023-01-11 RX ADMIN — CARBIDOPA AND LEVODOPA SCH TAB: 25; 100 TABLET ORAL at 13:52

## 2023-01-11 NOTE — PHARMACY PROGRESS NOTE
- Best Possible Medication History


Admit Date and Time: 01/10/23 2003


Processed by: Pharmacy


Medication History completed: Yes


Secondary Source(s): Facility MAR as ONLY source





As the person ultimately responsible for medication therapy, providers are able 

to order a medication from an existing home medication list in [facility] via the 

"Reconcile Routine" prior to Confirmation of that medication by support staff. 

Such practice is discouraged except when the physician, in their clinical 

judgment, deems that a medical need exists for a medication without regard to 

previous use.

## 2023-01-11 NOTE — XRAY REPORT
PROCEDURE:  Chest 1 View X-Ray

 

INDICATIONS:  Pleuritic Chest pain, worsened cough

 

TECHNIQUE:  One view of the chest was acquired.  

 

COMPARISON:  1/10/2023

 

FINDINGS:  

 

Surgical changes and devices:  None.  

 

Lungs and pleura:  No pleural effusions or pneumothorax. Mild patchy left perihilar and basilar opaci
ty.

 

Mediastinum:  Mediastinal contours appear normal.  Heart size is normal.  

 

Bones and chest wall:  No suspicious bony lesions.  Overlying soft tissues appear unremarkable.  

 

 

IMPRESSION:  

 

Left lung pneumonia. Follow-up PA and lateral chest x-rays or chest CT is recommended to ensure resol
ution, and to exclude underlying neoplasm.

 

Reviewed by: Shantelle Peraza MD on 1/11/2023 2:56 PM PST

Approved by: Shantelle Peraza MD on 1/11/2023 2:56 PM PST

 

 

Station ID:  [id number]

## 2023-01-11 NOTE — PROVIDER PROGRESS NOTE
Assessment/Plan





- Problem List


(1) Chest pain


Assessment/Plan: 


At midmorning today, the patient developed chest pain in the center chest that 

she rated 8/10.  I evaluated the patient at this time during ongoing pain: She 

could not point to where her pain was (which she apparently did show to her 

daughter and granddaughter who are at bedside).  She could not take a deep 

breath, due to the pain, suggesting that it is pleuritic pain.  There was 

worsening tenderness with palpation of the anterior chest as well.


With the daughter and granddaughter at bedside, I obtained history that the 

patient had a stress test many years ago and that it was WNL.


Plan:


Will order sublingual nitroglycerin as needed


Cycle troponins


Obtain STAT EKG>> Her stat EKG was done and showed (my interpretation): A. fib 

versus NSR with PACs (she has very low voltage and the P waves are not 

definitely seen and has a tremor from her Parkinson's therefore A. fib is 

suggested).  Low voltage. Normal QRS axis. PVC.  QS waves V1 through V3 

consistent with an old anterior MI.  There is no prior EKG done at this 

facility, available for comparison.


We will continue with Tylenol for pain control, and try to avoid nercotics that 

may add to her somnolence and confusion.





(2) Severe sepsis


Improving in terms of mental status, more alert. Her CBC is still pending today.


Plan:


Continue with IV antibiotics and IV fluids





(3) UTI


Abnormal UA.  This appears to be source of the sepsis and AMS.


Plan:


Continue with IV antibiotics and IV fluids





(4) RSV infection


Her chest x-ray did not show any infiltrate however the daughter tells me that 

the patient complains of a cough that she cannot expectorate


She may have evidence of a PNA now on chest x-ray, after having received IV 

fluids since yesterday.


Plan:


Repeat chest x-ray>> This showed a left sided pneumonia. We will continue with 

iv Ceftriaxone and now add IV Zithromax


Continue with droplet isolation and supportive care.


Will start Mucinex.





(5) Hypotension


BP is 80-90 syst


This low BP persists and is concerning for having sepsis.  However the patient 

is very small, 5 foot 2, 99 pounds and may now run a "soft" blood pressure.


Plan:


Continue with IV hydration.


Will obtain an Echocardiogram


Her meds are not yet reconciled by pharmacy, will hold any meds that drop blood 

pressure





(6) Hx of HTN


As per history.


Plan:


Her meds are not yet reconciled by pharmacy, will hold any meds that drop her 

blood pressure





(7) Parkinson's disease with behavioral disturbances


The daughter reports that the patient normally has hallucinations, even when she

is at her best on her Parkinson treatment


Plan:


Will resume her usual Parkinsons and any psych medications, once they are 

reconciled by pharmacy





(8) AMS


Improved/resolved. She is not as somnolent, is awake, she only answers minimally

to strangers but more easily to family, according to the daughter.


Plan:


Continue to treat the underlying causes for her confusion and somnolence.











- Current Meds


Current Meds: 





                               Current Medications











Generic Name Dose Route Start Last Admin





  Trade Name Freq  PRN Reason Stop Dose Admin


 


Acetaminophen  650 mg  01/10/23 20:03  01/11/23 11:56





  Acetaminophen 325 Mg Tablet  PO   650 mg





  Q4HR PRN   Administration





  Pain 1 to 4, or Fever  


 


Sodium Chloride  1,000 mls @ 125 mls/hr  01/10/23 21:00  01/11/23 05:41





  Normal Saline 0.9%  IV   125 mls/hr





  .Q8H CALDERON   Administration


 


Ceftriaxone Sodium 1 gm/  100 mls @ 200 mls/hr  01/11/23 09:00  01/11/23 09:40





  Sodium Chloride  IV   Infused





  DAILY CALDERON   Infusion


 


Sodium Chloride  10 ml  01/11/23 01:00  01/11/23 09:10





  Sodium Chloride Flush 0.9% 10 Ml Syringe  IVP   10 ml





  0100,0900,1700 CALDERON   Administration














- Lab Result


Fish Bone Diagrams: 


                                 01/10/23 17:56





                                 01/10/23 17:56





- EKG Results


EKG Comparison: Old EKG unavailable


EKG Findings: 





(My interpretation): A. fib versus NSR with PACs (she has very low voltage and 

the P waves are not definitely seen and has a tremor from her Parkinson's 

therefore A. fib is suggested).  Low voltage. Normal QRS axis. PVC.  QS waves V1

through V3 consistent with an old anterior MI.  There is no prior EKG done at 

this facility, available for comparison.











- Additional Planning


My Orders: 





My Active Orders





01/11/23 Breakfast


DIET [Dysphagia - Minced and Moist] [DIET] 





01/11/23 12:54


TROPONIN I HIGH SENSITIVITY [IAI] Stat 





01/11/23 12:56


Echo Transthoracic Complete [ECHO] Routine 





01/11/23 16:00


TROPONIN I HIGH SENSITIVITY [IAI] Timed 





01/12/23 05:00


BMP - BASIC METABOLIC PANEL [CHEM] DAILYLAB 


CBC - COMP BLD CT W/AUTO DIFF [HEME] DAILYLAB 





01/13/23 05:00


BMP - BASIC METABOLIC PANEL [CHEM] DAILYLAB 


CBC - COMP BLD CT W/AUTO DIFF [HEME] DAILYLAB 





01/14/23 05:00


BMP - BASIC METABOLIC PANEL [CHEM] DAILYLAB 


CBC - COMP BLD CT W/AUTO DIFF [HEME] DAILYLAB 














Subjective





- Subjective


Patient Reports: Cough, Chest Pain





Objective


Vital Signs: 





                               Vital Signs - 24 hr











  01/10/23 01/10/23 01/10/23





  17:26 17:58 18:28


 


Temperature 37.1 C  


 


Heart Rate 96 90 86


 


Heart Rate [   





Brachial]   


 


Respiratory 31 H 26 H 23





Rate   


 


Blood Pressure 82/60 L 88/61 L 72/50 L


 


Blood Pressure   





[Left Brachial   





artery]   


 


Blood Pressure   





[Right Brachial   





artery]   


 


O2 Saturation 96 94 97














  01/10/23 01/10/23 01/10/23





  19:00 19:37 22:04


 


Temperature   36.4 C L


 


Heart Rate 96 91 


 


Heart Rate [   96





Brachial]   


 


Respiratory 28 H 26 H 14





Rate   


 


Blood Pressure 80/58 L 91/64 


 


Blood Pressure   83/56 L





[Left Brachial   





artery]   


 


Blood Pressure   





[Right Brachial   





artery]   


 


O2 Saturation 99 99 96














  01/11/23 01/11/23 01/11/23





  01:08 07:31 11:07


 


Temperature 36.6 C 37.0 C 


 


Heart Rate   


 


Heart Rate [ 89 106 H 96





Brachial]   


 


Respiratory 23 16 18





Rate   


 


Blood Pressure   


 


Blood Pressure   





[Left Brachial   





artery]   


 


Blood Pressure 94/63 94/61 82/54 L





[Right Brachial   





artery]   


 


O2 Saturation 95 97 95














  01/11/23 01/11/23 01/11/23





  11:15 11:18 11:22


 


Temperature   


 


Heart Rate   


 


Heart Rate [ 116 H 109 H 108 H





Brachial]   


 


Respiratory 18 19 18





Rate   


 


Blood Pressure   


 


Blood Pressure   





[Left Brachial   





artery]   


 


Blood Pressure 90/57 L 85/59 L 90/57 L





[Right Brachial   





artery]   


 


O2 Saturation 95 95 96














  01/11/23





  11:57


 


Temperature 


 


Heart Rate 99


 


Heart Rate [ 





Brachial] 


 


Respiratory 





Rate 


 


Blood Pressure 90/57 L


 


Blood Pressure 





[Left Brachial 





artery] 


 


Blood Pressure 





[Right Brachial 





artery] 


 


O2 Saturation 








                                     Oxygen











O2 Source                      Room air














I&O (Last 24 Hrs): 





                          Intake and Output Totals x24h











 01/09/23 01/10/23 01/11/23





 23:59 23:59 23:59


 


Intake Total  2240 400


 


Balance  2240 400











General: Alert, Mild distress (from current pleuritic CP)


HEENT: Other (Dry oral mucosa)


Neck: Supple, No JVD


Neuro: Alert, Non Focal, Other ([place], Minimally communicative)


Cardiovascular: No murmurs


Respiratory: Rales (Left side greater than right at base)


Abdomen: Normal bowel sounds, Soft


Extremities: No clubbing, No edema, Other (Muscle wasting)





- Results


Results: 





                               Laboratory Results











WBC  20.5 x10^3/uL (4.8-10.8)  H  01/10/23  17:56    


 


RBC  3.93 10^6/uL (4.20-5.40)  L  01/10/23  17:56    


 


Hgb  10.1 g/dL (12.0-16.0)  L  01/10/23  17:56    


 


Hct  33.0 % (37.0-47.0)  L  01/10/23  17:56    


 


MCV  84.0 fL (81.0-99.0)   01/10/23  17:56    


 


MCH  25.7 pg (27.0-31.0)  L  01/10/23  17:56    


 


MCHC  30.6 g/dL (32.0-36.0)  L  01/10/23  17:56    


 


RDW  14.5 % (12.0-15.0)   01/10/23  17:56    


 


Plt Count  208 10^3/uL (130-450)   01/10/23  17:56    


 


MPV  10.5 fL (7.9-10.8)   01/10/23  17:56    


 


Neut # (Auto)  Not Reportable   01/10/23  17:56    


 


Lymph # (Auto)  Not Reportable   01/10/23  17:56    


 


Mono # (Auto)  Not Reportable   01/10/23  17:56    


 


Eos # (Auto)  Not Reportable   01/10/23  17:56    


 


Baso # (Auto)  Not Reportable   01/10/23  17:56    


 


Absolute Nucleated RBC  Not Reportable   01/10/23  17:56    


 


Total Counted  100   01/10/23  17:56    


 


Band Neuts % (Manual)  10 % (0-10)  01/10/23  17:56    


 


Abnorm Lymph % (Manual)  0 %  01/10/23  17:56    


 


Nucleated RBC %  Not Reportable   01/10/23  17:56    


 


Neutrophils # (Manual)  18.7 10^3/uL (1.5-6.6)  H  01/10/23  17:56    


 


Lymphocytes # (Manual)  1.0 10^3/uL (1.5-3.5)  L  01/10/23  17:56    


 


Monocytes # (Manual)  0.8 10^3/uL (0.0-1.0)   01/10/23  17:56    


 


Eosinophils # (Manual)  0.0 10^3/uL (0-0.7)   01/10/23  17:56    


 


Basophils # (Manual)  0.0 10^3/uL (0-0.1)   01/10/23  17:56    


 


Differential Comment  MANUAL DIFFERENTIAL   01/10/23  17:56    


 


WBC Morphology  1+ TOXIC GRANULATION  (NORMAL)   01/10/23  17:56    


 


Platelet Estimate  NORMAL (130-450,000)  (NORMAL)   01/10/23  17:56    


 


Platelet Morphology  NORMAL APPEARANCE  (NORMAL)   01/10/23  17:56    


 


RBC Morph Micro Appear  NORMAL APPEARANCE  (NORMAL)   01/10/23  17:56    


 


PT  14.5 secs (9.9-12.6)  H  01/10/23  17:56    


 


INR  1.3  (0.8-1.2)  H  01/10/23  17:56    


 


APTT  28.4 secs (24.9-33.3)   01/10/23  17:56    


 


Sodium  142 mmol/L (135-145)   01/10/23  17:56    


 


Potassium  3.8 mmol/L (3.5-5.0)   01/10/23  17:56    


 


Chloride  100 mmol/L (101-111)  L  01/10/23  17:56    


 


Carbon Dioxide  31 mmol/L (21-32)   01/10/23  17:56    


 


Anion Gap  11.0  (6-13)   01/10/23  17:56    


 


BUN  33 mg/dL (6-20)  H  01/10/23  17:56    


 


Creatinine  1.1 mg/dL (0.4-1.0)  H  01/10/23  17:56    


 


Estimated GFR (MDRD)  47  (>89)  L  01/10/23  17:56    


 


Glucose  109 mg/dL ()  H  01/10/23  17:56    


 


Lactic Acid  1.4 mmol/L (0.5-2.2)   01/11/23  01:12    


 


Calcium  9.2 mg/dL (8.5-10.3)   01/10/23  17:56    


 


Total Bilirubin  1.1 mg/dL (0.2-1.0)  H  01/10/23  17:56    


 


AST  14 IU/L (10-42)   01/10/23  17:56    


 


ALT  < 10 IU/L (10-60)  L  01/10/23  17:56    


 


Alkaline Phosphatase  56 IU/L ()   01/10/23  17:56    


 


Total Protein  5.5 g/dL (6.7-8.2)  L  01/10/23  17:56    


 


Albumin  2.7 g/dL (3.2-5.5)  L  01/10/23  17:56    


 


Globulin  2.8 g/dL (2.1-4.2)   01/10/23  17:56    


 


Albumin/Globulin Ratio  1.0  (1.0-2.2)   01/10/23  17:56    


 


Lipase  21 U/L (22-51)  L  01/10/23  17:56    


 


Urine Color  YELLOW   01/10/23  17:41    


 


Urine Clarity  TURBID  (CLEAR)   01/10/23  17:41    


 


Urine pH  7.5 PH (5.0-7.5)   01/10/23  17:41    


 


Ur Specific Gravity  1.020  (1.002-1.030)   01/10/23  17:41    


 


Urine Protein  >=300 mg/dL (NEGATIVE)  H  01/10/23  17:41    


 


Urine Glucose (UA)  NEGATIVE mg/dL (NEGATIVE)   01/10/23  17:41    


 


Urine Ketones  TRACE mg/dL (NEGATIVE)   01/10/23  17:41    


 


Urine Occult Blood  LARGE  (NEGATIVE)  H  01/10/23  17:41    


 


Urine Nitrite  NEGATIVE  (NEGATIVE)   01/10/23  17:41    


 


Urine Bilirubin  NEGATIVE  (NEGATIVE)   01/10/23  17:41    


 


Urine Urobilinogen  1 (NORMAL) E.U./dL (NORMAL)   01/10/23  17:41    


 


Ur Leukocyte Esterase  SMALL  (NEGATIVE)  H  01/10/23  17:41    


 


Urine RBC  11-25 /HPF (0-5)  H  01/10/23  17:41    


 


Urine WBC  >25 /HPF (0-5)  H  01/10/23  17:41    


 


Ur Squamous Epith Cells  RARE Squamous  (<= Few)   01/10/23  17:41    


 


Urine Bacteria  Many /HPF (None Seen)  H  01/10/23  17:41    


 


Ur Microscopic Review  INDICATED   01/10/23  17:41    


 


Urine Culture Comments  INDICATED   01/10/23  17:41    


 


Nasal Adenovirus (PCR)  NOT DETECTED   01/10/23  17:46    


 


Nasal B. parapertussis DNA (PCR)  NOT DETECTED   01/10/23  17:46    


 


Nasal Coronavir 229E PCR  NOT DETECTED   01/10/23  17:46    


 


Nasal Coronavir HKU1 PCR  NOT DETECTED   01/10/23  17:46    


 


Nasal Coronavir NL63 PCR  NOT DETECTED   01/10/23  17:46    


 


Nasal Coronavir OC43 PCR  NOT DETECTED   01/10/23  17:46    


 


Nasal Enterovir/Rhinovir PCR  NOT DETECTED   01/10/23  17:46    


 


Nasal Influenza B PCR  NOT DETECTED   01/10/23  17:46    


 


Nasal Influenza A PCR  NOT DETECTED   01/10/23  17:46    


 


Nasal Parainfluen 1 PCR  NOT DETECTED   01/10/23  17:46    


 


Nasal Parainfluen 2 PCR  NOT DETECTED   01/10/23  17:46    


 


Nasal Parainfluen 3 PCR  NOT DETECTED   01/10/23  17:46    


 


Nasal Parainfluen 4 PCR  NOT DETECTED   01/10/23  17:46    


 


Nasal RSV (PCR)  DETECTED  A  01/10/23  17:46    


 


Nasal B.pertussis DNA PCR  NOT DETECTED   01/10/23  17:46    


 


Nasal C.pneumoniae (PCR)  NOT DETECTED   01/10/23  17:46    


 


Yousif Human Metapneumo PCR  NOT DETECTED   01/10/23  17:46    


 


Nasal M.pneumoniae (PCR)  NOT DETECTED   01/10/23  17:46    


 


Nasal SARS-CoV-2 (PCR)  NOT DETECTED   01/10/23  17:46

## 2023-01-12 LAB
ANION GAP SERPL CALCULATED.4IONS-SCNC: 7 MMOL/L (ref 6–13)
BASOPHILS NFR BLD AUTO: 0 10^3/UL (ref 0–0.1)
BASOPHILS NFR BLD AUTO: 0.2 %
BUN SERPL-MCNC: 30 MG/DL (ref 6–20)
CALCIUM UR-MCNC: 8.4 MG/DL (ref 8.5–10.3)
CHLORIDE SERPL-SCNC: 110 MMOL/L (ref 101–111)
CO2 SERPL-SCNC: 25 MMOL/L (ref 21–32)
CREAT SERPLBLD-SCNC: 0.5 MG/DL (ref 0.4–1)
EOSINOPHIL # BLD AUTO: 0 10^3/UL (ref 0–0.7)
EOSINOPHIL NFR BLD AUTO: 0.7 %
ERYTHROCYTE [DISTWIDTH] IN BLOOD BY AUTOMATED COUNT: 15 % (ref 12–15)
GFRSERPLBLD MDRD-ARVRAT: 116 ML/MIN/{1.73_M2} (ref 89–?)
GLUCOSE SERPL-MCNC: 78 MG/DL (ref 70–100)
HCT VFR BLD AUTO: 28.8 % (ref 37–47)
HGB UR QL STRIP: 8.7 G/DL (ref 12–16)
LYMPHOCYTES # SPEC AUTO: 0.7 10^3/UL (ref 1.5–3.5)
LYMPHOCYTES NFR BLD AUTO: 11.5 %
MCH RBC QN AUTO: 25.4 PG (ref 27–31)
MCHC RBC AUTO-ENTMCNC: 30.2 G/DL (ref 32–36)
MCV RBC AUTO: 84.2 FL (ref 81–99)
MONOCYTES # BLD AUTO: 0.1 10^3/UL (ref 0–1)
MONOCYTES NFR BLD AUTO: 2.3 %
NEUTROPHILS # BLD AUTO: 4.9 10^3/UL (ref 1.5–6.6)
NEUTROPHILS # SNV AUTO: 5.7 X10^3/UL (ref 4.8–10.8)
NEUTROPHILS NFR BLD AUTO: 84.8 %
NRBC # BLD AUTO: 0 /100WBC
NRBC # BLD AUTO: 0 X10^3/UL
PDW BLD AUTO: 10.8 FL (ref 7.9–10.8)
PLATELET # BLD: 97 10^3/UL (ref 130–450)
POTASSIUM SERPL-SCNC: 3.8 MMOL/L (ref 3.5–5)
RBC MAR: 3.42 10^6/UL (ref 4.2–5.4)
SODIUM SERPLBLD-SCNC: 142 MMOL/L (ref 135–145)

## 2023-01-12 RX ADMIN — Medication SCH MCG: at 10:13

## 2023-01-12 RX ADMIN — SODIUM CHLORIDE SCH MLS/HR: 9 INJECTION, SOLUTION INTRAVENOUS at 09:58

## 2023-01-12 RX ADMIN — POTASSIUM CHLORIDE SCH MLS/HR: 7.46 INJECTION, SOLUTION INTRAVENOUS at 01:57

## 2023-01-12 RX ADMIN — DILTIAZEM HYDROCHLORIDE SCH: 120 CAPSULE, COATED, EXTENDED RELEASE ORAL at 13:41

## 2023-01-12 RX ADMIN — CARBIDOPA AND LEVODOPA SCH TAB: 25; 100 TABLET, EXTENDED RELEASE ORAL at 21:05

## 2023-01-12 RX ADMIN — SODIUM CHLORIDE SCH MLS/HR: 9 INJECTION, SOLUTION INTRAVENOUS at 10:01

## 2023-01-12 RX ADMIN — Medication SCH MCG: at 09:59

## 2023-01-12 RX ADMIN — CARBIDOPA AND LEVODOPA SCH TAB: 25; 100 TABLET, EXTENDED RELEASE ORAL at 09:59

## 2023-01-12 RX ADMIN — SODIUM CHLORIDE, PRESERVATIVE FREE SCH: 5 INJECTION INTRAVENOUS at 10:02

## 2023-01-12 RX ADMIN — CARBIDOPA AND LEVODOPA SCH TAB: 25; 100 TABLET ORAL at 17:20

## 2023-01-12 RX ADMIN — THERA TABS SCH TAB: TAB at 10:12

## 2023-01-12 RX ADMIN — SODIUM CHLORIDE, PRESERVATIVE FREE SCH: 5 INJECTION INTRAVENOUS at 17:20

## 2023-01-12 RX ADMIN — METOPROLOL SUCCINATE SCH: 25 TABLET, EXTENDED RELEASE ORAL at 21:05

## 2023-01-12 RX ADMIN — ASPIRIN SCH MG: 81 TABLET, COATED ORAL at 09:59

## 2023-01-12 RX ADMIN — CARBIDOPA AND LEVODOPA SCH TAB: 25; 100 TABLET ORAL at 21:06

## 2023-01-12 RX ADMIN — CARBIDOPA AND LEVODOPA SCH TAB: 25; 100 TABLET ORAL at 13:36

## 2023-01-12 RX ADMIN — DILTIAZEM HYDROCHLORIDE SCH: 120 CAPSULE, COATED, EXTENDED RELEASE ORAL at 13:42

## 2023-01-12 RX ADMIN — SODIUM CHLORIDE SCH MLS/HR: 9 INJECTION, SOLUTION INTRAVENOUS at 21:14

## 2023-01-12 RX ADMIN — POTASSIUM CHLORIDE SCH MLS/HR: 7.46 INJECTION, SOLUTION INTRAVENOUS at 03:05

## 2023-01-12 RX ADMIN — SENNOSIDES SCH MG: 8.6 TABLET, FILM COATED ORAL at 10:13

## 2023-01-12 RX ADMIN — CARBIDOPA AND LEVODOPA SCH TAB: 25; 100 TABLET ORAL at 06:14

## 2023-01-12 RX ADMIN — CARBIDOPA AND LEVODOPA SCH TAB: 25; 100 TABLET ORAL at 09:59

## 2023-01-12 RX ADMIN — POTASSIUM CHLORIDE SCH MLS/HR: 7.46 INJECTION, SOLUTION INTRAVENOUS at 00:49

## 2023-01-12 RX ADMIN — DILTIAZEM HYDROCHLORIDE SCH: 120 CAPSULE, COATED, EXTENDED RELEASE ORAL at 21:05

## 2023-01-12 RX ADMIN — DEXTROSE MONOHYDRATE SCH MLS/HR: 50 INJECTION, SOLUTION INTRAVENOUS at 10:01

## 2023-01-12 NOTE — PROVIDER PROGRESS NOTE
Hospitalist Cross-cover Note





- Cross-Cover Note


Cross-Cover Note: 





Called bY rn Stating KCL is 3.1 no replaced since afternoon, have ordered KCL 10

meq q one hour x 3 bags

## 2023-01-12 NOTE — PROVIDER PROGRESS NOTE
Assessment/Plan





- Problem List


(1) Pneumonia


Assessment/Plan: 


When she complained of a new cough and pleuritic chest pain yesterday 1/11, a 

chest x-ray was repeated and this showed a new pneumonia on the left side.


She was already on ceftriaxone for UTI.


Zithromax has been added


Plan:


Continue antibx


Cont Mucinex


Await cx results





(2) Pleuritic chest pain


Assessment/Plan: 


Yesterday the patient had several episodes of chest pain in the center chest 

that she rated 8/10.  I evaluated the patient at this time during ongoing pain: 

She could not point to where her pain was (which she apparently did show to her 

daughter and granddaughter who are at bedside).  She could not take a deep 

breath, due to the pain, suggesting that it is pleuritic pain.  A CXR was done 

that showed a new pneumonia, seen after a day of iv hydration.


There was worsening tenderness with palpation of the anterior chest as well.


With the daughter and granddaughter at bedside, I obtained history that the 

patient had a stress test many years ago and that it was WNL.


Will order sublingual nitroglycerin as needed


Cycled troponins and these were entirely normal


A stat EKG was done and showed A. fib versus NSR with PACs (she has very low 

voltage and the P waves are not definitely seen and has a tremor from her 

Parkinson's therefore A. fib is suggested).  Low voltage. Normal QRS axis. PVC. 

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  There is no prior 

EKG done at this facility, available for comparison.


Plan:


We will continue with Tylenol for pain control, and try to avoid nercotics that 

may add to her somnolence and confusion.





(3) UTI


Abnormal UA.  This appeared to be source of the sepsis and AMS at presentation.


Plan:


Continue with IV antibiotics and IV fluids





(4) RSV infection


Her chest x-ray did not show any infiltrate at admission, however the daughter 

tells me that the patient complains of a cough that she cannot expectorate. The 

chest x-ray yesterday did show a new left-sided infiltrate. Mucinex was started.


Plan:


Cont Mucinex.





(5) Hypotension


BP is 80-90 syst


Her "soft" BP persists and is consistent with sepsis and volume depletion. 


Plan:


Continue with IV hydration.


Will obtain an Echocardiogram


Will hold any meds that drop blood pressure





(6) Hx of HTN


As per history.


Plan:


Will hold any meds that drop her blood pressure





(7) Parkinson's disease with behavioral disturbances


The daughter reports that the patient normally has hallucinations, even when she

is at her best on her Parkinson treatment


Plan:


Continue her usual Parkinsons and any psych medications





(8) AMS


Improved/resolved. She is not as somnolent, is awake, she only answers minimally

to strangers but more easily to family, according to the daughter.


Plan:


Continue to treat the underlying causes for her confusion and somnolence, her 

infection and dehydration..





(9) Severe sepsis


Resolved with improved mental sttus and WBC


Plan:


Continue with IV antibiotics and IV fluids











- Current Meds


Current Meds: 





                               Current Medications











Generic Name Dose Route Start Last Admin





  Trade Name Freq  PRN Reason Stop Dose Admin


 


Acetaminophen  650 mg  01/10/23 20:03  01/11/23 18:41





  Acetaminophen 325 Mg Tablet  PO   650 mg





  Q4HR PRN   Administration





  Pain 1 to 4, or Fever  


 


Carbidopa/Levodopa  1 tab  01/11/23 14:00  01/11/23 21:28





  Carbidopa/Levodopa Er 25 Mg/100 Mg Tablet  PO   1 tab





  BID CALDERON   Administration


 


Carbidopa/Levodopa  1 tab  01/11/23 14:00  01/12/23 06:14





  Carbidopa/Levodopa 25 Mg/100 Mg Tablet  PO   1 tab





  5XD CALDERON   Administration


 


Guaifenesin  600 mg  01/11/23 21:00  01/11/23 21:28





  Guaifenesin 600 Mg Tablet  PO   600 mg





  BID CALDERON   Administration


 


Sodium Chloride  1,000 mls @ 125 mls/hr  01/10/23 21:00  01/11/23 22:57





  Normal Saline 0.9%  IV   125 mls/hr





  .Q8H CALDERON   Administration


 


Ceftriaxone Sodium 1 gm/  100 mls @ 200 mls/hr  01/11/23 09:00  01/11/23 09:40





  Sodium Chloride  IV   Infused





  DAILY CALDERON   Infusion


 


Metoprolol Succinate  25 mg  01/11/23 21:00  01/11/23 21:29





  Metoprolol Succinate 25 Mg Tablet  PO   Not Given





  QPM CALDERON  


 


**Ipratropium  2 each  01/11/23 21:00  01/11/23 21:29





Bromide [Ipratropium  CANDE   Not Given





Bromide] 15 Ml  BID CALDERON  





Spray**   


 


Sodium Chloride  10 ml  01/11/23 01:00  01/11/23 23:58





  Sodium Chloride Flush 0.9% 10 Ml Syringe  IVP   Not Given





  0100,0900,1700 CALDERON  














- Lab Result


Fish Bone Diagrams: 


                                 01/12/23 04:46





                                 01/12/23 04:46





- Additional Planning


My Orders: 





My Active Orders





01/11/23 13:23


Lidocaine Patch 5% [Lidoderm Patch]   1 patch TOP DAILY PRN 





01/11/23 14:00


Carbidopa/Levodopa 25/100 [Sinemet 25 mg/100 mg]   1 tab PO 5XD 


Carbidopa/Levodopa ER 25/100 [Sinemet Cr 25 mg/100 mg]   1 tab PO BID 





01/11/23 21:00


Metoprolol Succinate [Toprol Xl]   25 mg PO QPM 


Patient Own Med [Patient Own Medication]   2 each CANDE BID 


guaiFENesin [Mucinex]   600 mg PO BID 





01/12/23 05:00


MAGNESIUM [CHEM] Routine 





01/12/23 08:00


Multivitamin [Theragran]   1 tab PO DAILYWM 





01/12/23 09:00


Aspirin EC [Ecotrin]   81 mg PO DAILY 


Azithromycin Inj [Zithromax Inj] 500 mg   Sodium Chloride 0.9% [Normal Saline 

0.9%] 250 ml IV DAILY 


Cholecalciferol [Vitamin D3]   25 mcg PO DAILY 





01/12/23 12:56


Echo Transthoracic Complete [ECHO] Routine 





01/13/23 05:00


BMP - BASIC METABOLIC PANEL [CHEM] DAILYLAB 


CBC - COMP BLD CT W/AUTO DIFF [HEME] DAILYLAB 





01/14/23 05:00


BMP - BASIC METABOLIC PANEL [CHEM] DAILYLAB 


CBC - COMP BLD CT W/AUTO DIFF [HEME] DAILYLAB 














Subjective





- Subjective


Nursing Reports: Other (RN reports she is slightly more alert, has fewer 

complaints of chest pain, appears to have an appetite)





Objective


Vital Signs: 





                               Vital Signs - 24 hr











  01/11/23 01/11/23 01/11/23





  11:07 11:15 11:18


 


Temperature   


 


Heart Rate   


 


Heart Rate [ 96 116 H 109 H





Brachial]   


 


Respiratory 18 18 19





Rate   


 


Blood Pressure   


 


Blood Pressure 82/54 L 90/57 L 85/59 L





[Right Brachial   





artery]   


 


O2 Saturation 95 95 95














  01/11/23 01/11/23 01/11/23





  11:22 11:57 15:35


 


Temperature   37.1 C


 


Heart Rate  99 


 


Heart Rate [ 108 H  102 H





Brachial]   


 


Respiratory 18  20





Rate   


 


Blood Pressure  90/57 L 


 


Blood Pressure 90/57 L  80/49 L





[Right Brachial   





artery]   


 


O2 Saturation 96  97














  01/11/23 01/12/23 01/12/23





  18:35 00:51 07:18


 


Temperature  36.6 C 36.4 C L


 


Heart Rate   


 


Heart Rate [ 99 95 92





Brachial]   


 


Respiratory 24 25 H 20





Rate   


 


Blood Pressure   


 


Blood Pressure 95/46 L 82/56 L 92/62





[Right Brachial   





artery]   


 


O2 Saturation 95 100 95








                                     Oxygen











O2 Source                      Room air














I&O (Last 24 Hrs): 





                          Intake and Output Totals x24h











 01/10/23 01/11/23 01/12/23





 23:59 23:59 23:59


 


Intake Total 2240 2986.000 450


 


Balance 2240 2986.000 450











General: Alert, No acute distress, Other (Cachectic elederly WF)


HEENT: Mucous membr. moist/pink


Neck: Supple


Neuro: Alert, Disoriented, Non Focal


Cardiovascular: Regular rate


Respiratory: No respiratory distress, Rales, Rhonchi


Abdomen: Normal bowel sounds, Soft


Extremities: No clubbing, No edema, No tenderness/swelling





- Results


Results: 





                               Laboratory Results











WBC  5.7 x10^3/uL (4.8-10.8)   01/12/23  04:46    


 


RBC  3.42 10^6/uL (4.20-5.40)  L  01/12/23  04:46    


 


Hgb  8.7 g/dL (12.0-16.0)  L  01/12/23  04:46    


 


Hct  28.8 % (37.0-47.0)  L  01/12/23  04:46    


 


MCV  84.2 fL (81.0-99.0)   01/12/23  04:46    


 


MCH  25.4 pg (27.0-31.0)  L  01/12/23  04:46    


 


MCHC  30.2 g/dL (32.0-36.0)  L  01/12/23  04:46    


 


RDW  15.0 % (12.0-15.0)   01/12/23  04:46    


 


Plt Count  97 10^3/uL (130-450)  L  01/12/23  04:46    


 


MPV  10.8 fL (7.9-10.8)   01/12/23  04:46    


 


Neut # (Auto)  4.9 10^3/uL (1.5-6.6)   01/12/23  04:46    


 


Lymph # (Auto)  0.7 10^3/uL (1.5-3.5)  L  01/12/23  04:46    


 


Mono # (Auto)  0.1 10^3/uL (0.0-1.0)   01/12/23  04:46    


 


Eos # (Auto)  0.0 10^3/uL (0.0-0.7)   01/12/23  04:46    


 


Baso # (Auto)  0.0 10^3/uL (0.0-0.1)   01/12/23  04:46    


 


Absolute Nucleated RBC  0.00 x10^3/uL  01/12/23  04:46    


 


Total Counted  100   01/10/23  17:56    


 


Band Neuts % (Manual)  10 % (0-10)  01/10/23  17:56    


 


Abnorm Lymph % (Manual)  0 %  01/10/23  17:56    


 


Nucleated RBC %  0.0 /100WBC  01/12/23  04:46    


 


Neutrophils # (Manual)  18.7 10^3/uL (1.5-6.6)  H  01/10/23  17:56    


 


Lymphocytes # (Manual)  1.0 10^3/uL (1.5-3.5)  L  01/10/23  17:56    


 


Monocytes # (Manual)  0.8 10^3/uL (0.0-1.0)   01/10/23  17:56    


 


Eosinophils # (Manual)  0.0 10^3/uL (0-0.7)   01/10/23  17:56    


 


Basophils # (Manual)  0.0 10^3/uL (0-0.1)   01/10/23  17:56    


 


Differential Comment  MANUAL DIFFERENTIAL   01/10/23  17:56    


 


WBC Morphology  1+ TOXIC GRANULATION  (NORMAL)   01/10/23  17:56    


 


Platelet Estimate  NORMAL (130-450,000)  (NORMAL)   01/10/23  17:56    


 


Platelet Morphology  NORMAL APPEARANCE  (NORMAL)   01/10/23  17:56    


 


RBC Morph Micro Appear  NORMAL APPEARANCE  (NORMAL)   01/10/23  17:56    


 


PT  14.5 secs (9.9-12.6)  H  01/10/23  17:56    


 


INR  1.3  (0.8-1.2)  H  01/10/23  17:56    


 


APTT  28.4 secs (24.9-33.3)   01/10/23  17:56    


 


Sodium  142 mmol/L (135-145)   01/12/23  04:46    


 


Potassium  3.8 mmol/L (3.5-5.0)   01/12/23  04:46    


 


Chloride  110 mmol/L (101-111)   01/12/23  04:46    


 


Carbon Dioxide  25 mmol/L (21-32)   01/12/23  04:46    


 


Anion Gap  7.0  (6-13)   01/12/23  04:46    


 


BUN  30 mg/dL (6-20)  H  01/12/23  04:46    


 


Creatinine  0.5 mg/dL (0.4-1.0)   01/12/23  04:46    


 


Estimated GFR (MDRD)  116  (>89)   01/12/23  04:46    


 


Glucose  78 mg/dL ()   01/12/23  04:46    


 


Lactic Acid  1.4 mmol/L (0.5-2.2)   01/11/23  01:12    


 


Calcium  8.4 mg/dL (8.5-10.3)  L  01/12/23  04:46    


 


Total Bilirubin  1.1 mg/dL (0.2-1.0)  H  01/10/23  17:56    


 


AST  14 IU/L (10-42)   01/10/23  17:56    


 


ALT  < 10 IU/L (10-60)  L  01/10/23  17:56    


 


Alkaline Phosphatase  56 IU/L ()   01/10/23  17:56    


 


Troponin I High Sens  9.6 ng/L (2.3-14.8)   01/11/23  15:56    


 


Total Protein  5.5 g/dL (6.7-8.2)  L  01/10/23  17:56    


 


Albumin  2.7 g/dL (3.2-5.5)  L  01/10/23  17:56    


 


Globulin  2.8 g/dL (2.1-4.2)   01/10/23  17:56    


 


Albumin/Globulin Ratio  1.0  (1.0-2.2)   01/10/23  17:56    


 


Lipase  21 U/L (22-51)  L  01/10/23  17:56    


 


Urine Color  YELLOW   01/10/23  17:41    


 


Urine Clarity  TURBID  (CLEAR)   01/10/23  17:41    


 


Urine pH  7.5 PH (5.0-7.5)   01/10/23  17:41    


 


Ur Specific Gravity  1.020  (1.002-1.030)   01/10/23  17:41    


 


Urine Protein  >=300 mg/dL (NEGATIVE)  H  01/10/23  17:41    


 


Urine Glucose (UA)  NEGATIVE mg/dL (NEGATIVE)   01/10/23  17:41    


 


Urine Ketones  TRACE mg/dL (NEGATIVE)   01/10/23  17:41    


 


Urine Occult Blood  LARGE  (NEGATIVE)  H  01/10/23  17:41    


 


Urine Nitrite  NEGATIVE  (NEGATIVE)   01/10/23  17:41    


 


Urine Bilirubin  NEGATIVE  (NEGATIVE)   01/10/23  17:41    


 


Urine Urobilinogen  1 (NORMAL) E.U./dL (NORMAL)   01/10/23  17:41    


 


Ur Leukocyte Esterase  SMALL  (NEGATIVE)  H  01/10/23  17:41    


 


Urine RBC  11-25 /HPF (0-5)  H  01/10/23  17:41    


 


Urine WBC  >25 /HPF (0-5)  H  01/10/23  17:41    


 


Ur Squamous Epith Cells  RARE Squamous  (<= Few)   01/10/23  17:41    


 


Urine Bacteria  Many /HPF (None Seen)  H  01/10/23  17:41    


 


Ur Microscopic Review  INDICATED   01/10/23  17:41    


 


Urine Culture Comments  INDICATED   01/10/23  17:41    


 


Nasal Adenovirus (PCR)  NOT DETECTED   01/10/23  17:46    


 


Nasal B. parapertussis DNA (PCR)  NOT DETECTED   01/10/23  17:46    


 


Nasal Coronavir 229E PCR  NOT DETECTED   01/10/23  17:46    


 


Nasal Coronavir HKU1 PCR  NOT DETECTED   01/10/23  17:46    


 


Nasal Coronavir NL63 PCR  NOT DETECTED   01/10/23  17:46    


 


Nasal Coronavir OC43 PCR  NOT DETECTED   01/10/23  17:46    


 


Nasal Enterovir/Rhinovir PCR  NOT DETECTED   01/10/23  17:46    


 


Nasal Influenza B PCR  NOT DETECTED   01/10/23  17:46    


 


Nasal Influenza A PCR  NOT DETECTED   01/10/23  17:46    


 


Nasal Parainfluen 1 PCR  NOT DETECTED   01/10/23  17:46    


 


Nasal Parainfluen 2 PCR  NOT DETECTED   01/10/23  17:46    


 


Nasal Parainfluen 3 PCR  NOT DETECTED   01/10/23  17:46    


 


Nasal Parainfluen 4 PCR  NOT DETECTED   01/10/23  17:46    


 


Nasal RSV (PCR)  DETECTED  A  01/10/23  17:46    


 


Nasal B.pertussis DNA PCR  NOT DETECTED   01/10/23  17:46    


 


Nasal C.pneumoniae (PCR)  NOT DETECTED   01/10/23  17:46    


 


Cande Human Metapneumo PCR  NOT DETECTED   01/10/23  17:46    


 


Nasal M.pneumoniae (PCR)  NOT DETECTED   01/10/23  17:46    


 


Nasal SARS-CoV-2 (PCR)  NOT DETECTED   01/10/23  17:46

## 2023-01-13 LAB
ANION GAP SERPL CALCULATED.4IONS-SCNC: 9 MMOL/L (ref 6–13)
BASOPHILS NFR BLD AUTO: 0 10^3/UL (ref 0–0.1)
BASOPHILS NFR BLD AUTO: 0.3 %
BUN SERPL-MCNC: 20 MG/DL (ref 6–20)
CALCIUM UR-MCNC: 8.3 MG/DL (ref 8.5–10.3)
CHLORIDE SERPL-SCNC: 112 MMOL/L (ref 101–111)
CO2 SERPL-SCNC: 24 MMOL/L (ref 21–32)
CREAT SERPLBLD-SCNC: 0.4 MG/DL (ref 0.4–1)
EOSINOPHIL # BLD AUTO: 0.1 10^3/UL (ref 0–0.7)
EOSINOPHIL NFR BLD AUTO: 0.8 %
ERYTHROCYTE [DISTWIDTH] IN BLOOD BY AUTOMATED COUNT: 15 % (ref 12–15)
GFRSERPLBLD MDRD-ARVRAT: 150 ML/MIN/{1.73_M2} (ref 89–?)
GLUCOSE SERPL-MCNC: 86 MG/DL (ref 70–100)
HCT VFR BLD AUTO: 29.1 % (ref 37–47)
HGB UR QL STRIP: 9.1 G/DL (ref 12–16)
LYMPHOCYTES # SPEC AUTO: 0.8 10^3/UL (ref 1.5–3.5)
LYMPHOCYTES NFR BLD AUTO: 13.3 %
MCH RBC QN AUTO: 26.1 PG (ref 27–31)
MCHC RBC AUTO-ENTMCNC: 31.3 G/DL (ref 32–36)
MCV RBC AUTO: 83.6 FL (ref 81–99)
MONOCYTES # BLD AUTO: 0.3 10^3/UL (ref 0–1)
MONOCYTES NFR BLD AUTO: 4.1 %
NEUTROPHILS # BLD AUTO: 5.1 10^3/UL (ref 1.5–6.6)
NEUTROPHILS # SNV AUTO: 6.3 X10^3/UL (ref 4.8–10.8)
NEUTROPHILS NFR BLD AUTO: 80.9 %
NRBC # BLD AUTO: 0 /100WBC
NRBC # BLD AUTO: 0 X10^3/UL
PDW BLD AUTO: 10.9 FL (ref 7.9–10.8)
PLATELET # BLD: 98 10^3/UL (ref 130–450)
POTASSIUM SERPL-SCNC: 3.3 MMOL/L (ref 3.5–5)
RBC MAR: 3.48 10^6/UL (ref 4.2–5.4)
SODIUM SERPLBLD-SCNC: 145 MMOL/L (ref 135–145)

## 2023-01-13 RX ADMIN — SODIUM CHLORIDE, PRESERVATIVE FREE SCH: 5 INJECTION INTRAVENOUS at 09:29

## 2023-01-13 RX ADMIN — CARBIDOPA AND LEVODOPA SCH TAB: 25; 100 TABLET ORAL at 09:28

## 2023-01-13 RX ADMIN — DEXTROSE MONOHYDRATE SCH MLS/HR: 50 INJECTION, SOLUTION INTRAVENOUS at 09:53

## 2023-01-13 RX ADMIN — CARBIDOPA AND LEVODOPA SCH TAB: 25; 100 TABLET ORAL at 21:51

## 2023-01-13 RX ADMIN — CARBIDOPA AND LEVODOPA SCH TAB: 25; 100 TABLET ORAL at 13:35

## 2023-01-13 RX ADMIN — CARBIDOPA AND LEVODOPA SCH TAB: 25; 100 TABLET ORAL at 18:10

## 2023-01-13 RX ADMIN — ACETAMINOPHEN PRN MG: 325 TABLET ORAL at 09:27

## 2023-01-13 RX ADMIN — SODIUM CHLORIDE, PRESERVATIVE FREE SCH: 5 INJECTION INTRAVENOUS at 02:02

## 2023-01-13 RX ADMIN — CARBIDOPA AND LEVODOPA SCH TAB: 25; 100 TABLET, EXTENDED RELEASE ORAL at 21:52

## 2023-01-13 RX ADMIN — SODIUM CHLORIDE SCH MLS/HR: 9 INJECTION, SOLUTION INTRAVENOUS at 09:54

## 2023-01-13 RX ADMIN — CARBIDOPA AND LEVODOPA SCH TAB: 25; 100 TABLET ORAL at 05:11

## 2023-01-13 RX ADMIN — DILTIAZEM HYDROCHLORIDE SCH: 120 CAPSULE, COATED, EXTENDED RELEASE ORAL at 11:05

## 2023-01-13 RX ADMIN — SENNOSIDES SCH MG: 8.6 TABLET, FILM COATED ORAL at 09:27

## 2023-01-13 RX ADMIN — MIRTAZAPINE SCH MG: 15 TABLET, FILM COATED ORAL at 21:51

## 2023-01-13 RX ADMIN — PRAMIPEXOLE DIHYDROCHLORIDE SCH MG: 0.25 TABLET ORAL at 21:52

## 2023-01-13 RX ADMIN — METOPROLOL SUCCINATE SCH MG: 25 TABLET, EXTENDED RELEASE ORAL at 21:51

## 2023-01-13 RX ADMIN — CARBIDOPA AND LEVODOPA SCH TAB: 25; 100 TABLET, EXTENDED RELEASE ORAL at 09:28

## 2023-01-13 RX ADMIN — SODIUM CHLORIDE, PRESERVATIVE FREE SCH ML: 5 INJECTION INTRAVENOUS at 18:10

## 2023-01-13 RX ADMIN — ASPIRIN SCH MG: 81 TABLET, COATED ORAL at 09:28

## 2023-01-13 RX ADMIN — Medication SCH MCG: at 09:28

## 2023-01-13 RX ADMIN — THERA TABS SCH TAB: TAB at 09:28

## 2023-01-13 RX ADMIN — DILTIAZEM HYDROCHLORIDE SCH: 120 CAPSULE, COATED, EXTENDED RELEASE ORAL at 21:52

## 2023-01-13 RX ADMIN — SODIUM CHLORIDE SCH MLS/HR: 9 INJECTION, SOLUTION INTRAVENOUS at 05:11

## 2023-01-13 RX ADMIN — SENNOSIDES SCH MG: 8.6 TABLET, FILM COATED ORAL at 04:00

## 2023-01-13 NOTE — PROVIDER PROGRESS NOTE
Assessment/Plan





- Problem List


(1) HCAP (healthcare-associated pneumonia)


Assessment/Plan: 


This patient is a resident of a nursing home. When she complained of a new cough

and pleuritic chest pain on 1/11, a chest x-ray was repeated and this showed a 

new pneumonia on the left side. It was not seen on the admission CXR, probably 

because she was dehydrated.


She never brought up any sputum to send for culture.  Blood cultures have been 

negative today


She was already on ceftriaxone for UTI.


Zithromax  was added and she completed a course of Zithro today.


Plan:


Continue iv Ceftriaxone for complete treatment of the PNA


Cont Mucinex


Await final blood cx results





(2) E. coli UTI


Abnormal UA at admission  This appeared to be the source of her sepsis and AMS 

at presentation.


Blood cultures are negative to date


Her urine culture has grown E. coli and sensitivities are available: It is 

resistant to the quinolones, sens to Keflex, Ceftriaxone and Bactrim


Plan:


Continue with IV Ceftriaxone, since she still needs to be on it for the 

pneumonia.  If she is to be discharged back to her nursing home soon, we can 

change her over to oral Bactrim DS (this was reviewed with the pharmacist)





(3) RSV infection


Her chest x-ray did not show any infiltrate at admission, however the daughter 

reported on day #2 that the patient complained of a cough that she cannot 

expectorate. The repeat chest x-ray did show a new left-sided infiltrate. 

Mucinex was started.


Plan:


Cont Mucinex.





(4) Hypotension


BP is running 90 syst


Her "soft" BP persists and is consistent with infection and volume depletion. 


We did obtain an Echocardiogram which showed: Normal LV and RV chamber sizes, 

normal LV and RV systolic function.  She has mild mitral and tricuspid 

regurgitation, PA pressure 35 to 40 mmHg and a patent foramen ovale present with

left-to-right shunt


Plan:


Will taper her IV fluids to off, since she is eating and drinking well


Will hold any meds other than Metoprolol, that drop blood pressure





(5) Afib


An EKG was done on the day she complained of chest pain.  The baseline had 

motion artifact (because of her Parkinson's tremor) but probably shows A. fib.  

It is unknown if she has had A. fib documented before. She has never had an EKG 

here, to have for comparison.


Her heart rate is occasionally documented at being 110, mostly it is under 100.


He reconciled medication list shows that she was already on metoprolol 25 mg qpm

and 1 baby aspirin daily


Plan:


Cont 1 baby aspirin daily for her stroke prophylaxis.


We will increase her metoprolol slightly, for better rate control





(6) Parkinson's disease with behavioral disturbances


The daughter reported that the patient normally has hallucinations, even when 

she is at her best and on her Parkinson treatment


Plan:


Continue her usual Parkinsons meds and any psych medications





(7) PFO


The Echo shows a left to right shunt, it is not going right to left which would 

give her a higher risk for an systemic emboli


Plan:


She is not a candidate for PFO closure, given her age and comorbidities





(8) Bedbound


Report was received that she is bedbound, transfers to a wheelchair using a Jaycee

lift.


Plan:


Continue to get her OOB daily using a lift


No PT or OT rehab have been ordered





(9) Hx of HTN


As per history.


Plan:


We are still hold any meds that drop her blood pressure





(10) AMS


Resolved. She is not as somnolent, is awake, she only answers minimally to 

strangers but more easily to family, according to the daughter.


Plan:


Continue to treat the underlying cause for her confusion and somnolence, her 

infections.





(11) Pleuritic chest pain


Assessment/Plan: 


Improved


On Day #2 the patient had several episodes of chest pain in the center chest 

that she rated 8/10.  She could not take a deep breath, due to the pain, 

suggesting that it was pleuritic pain.  A CXR was done that showed a new 

pneumonia, which developed after a day of iv hydration.


We cycled troponins and these were entirely normal


A stat EKG was done and showed A. fib and no ischemic changes


Plan:


We will continue Tylenol for pain control, and try to avoid nercotics that may 

cause somnolence and confusion.





(12) Severe sepsis


Resolved, along with improved mental status and WBC


Plan:


Continue with IV antibiotics











- Current Meds


Current Meds: 





                               Current Medications











Generic Name Dose Route Start Last Admin





  Trade Name Freq  PRN Reason Stop Dose Admin


 


Acetaminophen  650 mg  01/10/23 20:03  01/13/23 09:27





  Acetaminophen 325 Mg Tablet  PO   650 mg





  Q4HR PRN   Administration





  Pain 1 to 4, or Fever  


 


Aspirin  81 mg  01/12/23 09:00  01/13/23 09:28





  Aspirin Ec 81 Mg Tablet  PO   81 mg





  DAILY CALDERON   Administration


 


Carbidopa/Levodopa  1 tab  01/11/23 14:00  01/13/23 09:28





  Carbidopa/Levodopa Er 25 Mg/100 Mg Tablet  PO   1 tab





  BID CALDERON   Administration


 


Carbidopa/Levodopa  1 tab  01/11/23 14:00  01/13/23 13:35





  Carbidopa/Levodopa 25 Mg/100 Mg Tablet  PO   1 tab





  5XD CALDERON   Administration


 


Cholecalciferol  25 mcg  01/12/23 09:00  01/13/23 09:28





  Cholecalciferol 25 Mcg Tablet  PO   25 mcg





  DAILY CALDERON   Administration


 


Cholecalciferol  50 mcg  01/12/23 09:00  01/13/23 09:28





  Cholecalciferol 25 Mcg Tablet  PO   50 mcg





  DAILY CALDERON   Administration


 


Estrogens Conjugated  0.498 applic  01/13/23 09:00  01/13/23 09:59





  Estrogens, Conjugated Cream 30 Gm Tube  VG   0.5 gm





  MOFR CALDERON   Administration


 


Guaifenesin  600 mg  01/11/23 21:00  01/13/23 09:28





  Guaifenesin 600 Mg Tablet  PO   600 mg





  BID CALDERON   Administration


 


Lidocaine  1 patch  01/11/23 13:23  01/12/23 10:00





  Lidocaine Patch 5%  TOP   1 patch





  DAILY PRN   Administration





  PAIN  


 


Metoprolol Succinate  25 mg  01/11/23 21:00  01/12/23 21:05





  Metoprolol Succinate 25 Mg Tablet  PO   Not Given





  QPM [facility]  


 


Multivitamins  1 tab  01/12/23 08:00  01/13/23 09:28





  Multivitamin Tablet  PO   1 tab





  DAILYWM CALDERON   Administration


 


**Ipratropium  2 each  01/11/23 21:00  01/13/23 11:05





Bromide [Ipratropium  CANDE   Not Given





Bromide] 15 Ml  BID [facility]  





Spray**   


 


Patient Own Med (D-  1 each  01/12/23 09:00  01/13/23 11:05





Mannose [Azo D-  PO   Not Given





Mannose] 500 Mg  DAILY [facility]  





Capsule)   


 


Polyethylene Glycol  17 gm  01/12/23 09:00  01/13/23 09:58





  Polyethylene Glycol 3350 17 Gm Packet  PO   Not Given





  DAILY CALDERON  


 


Senna  17.2 mg  01/12/23 09:00  01/13/23 09:27





  Senna 8.6 Mg Tablet  PO   17.2 mg





  DAILY CALDERON   Administration


 


Sodium Chloride  10 ml  01/11/23 01:00  01/13/23 09:29





  Sodium Chloride Flush 0.9% 10 Ml Syringe  IVP   Not Given





  0100,0900,1700 CALDERON  














- Lab Result


Fish Bone Diagrams: 


                                 01/13/23 04:45





                                 01/13/23 04:45





- Additional Planning


My Orders: 





My Active Orders





01/13/23 09:00


Estrogens, Conjugated Cream [Premarin Cream]   0.498 applic VG MOFR 





01/13/23 Lunch


Soft Mechanical Diet [DIET] 





01/13/23 21:00


Mirtazapine [Remeron]   7.5 mg PO QPM 


Pramipexole [Mirapex]   0.25 mg PO QPM 





01/14/23 05:00


BMP - BASIC METABOLIC PANEL [CHEM] DAILYLAB 


CBC - COMP BLD CT W/AUTO DIFF [HEME] DAILYLAB 














Subjective





- Subjective


Patient Reports: Resting Comfortably, Other (Sitting upright in bed, watching 

TV, just finished eating)





Objective


Vital Signs: 





                               Vital Signs - 24 hr











  01/12/23 01/13/23 01/13/23





  15:49 00:00 08:00


 


Temperature 36.3 C L 37.3 C 37.1 C


 


Heart Rate [ 104 H 110 H 89





Brachial]   


 


Respiratory 26 H 20 18





Rate   


 


Blood Pressure   119/79





[Left Brachial   





artery]   


 


Blood Pressure 96/61 107/62 





[Right Brachial   





artery]   


 


O2 Saturation 94 93 98








                                     Oxygen











O2 Source                      Room air














I&O (Last 24 Hrs): 





                          Intake and Output Totals x24h











 01/11/23 01/12/23 01/13/23





 23:59 23:59 23:59


 


Intake Total 2986.000 3667.083 1906.667


 


Balance 2986.000 3667.083 1906.667











General: Alert, No acute distress


HEENT: Mucous membr. moist/pink, Other (Cachectic with temporal wasting)


Neck: Supple, No JVD


Neuro: Alert, Disoriented, Other (Generalized weakness (has been bedbound for a 

long time))


Cardiovascular: No murmurs


Respiratory: No respiratory distress, Breath sounds nml


Abdomen: Normal bowel sounds, Soft, No tenderness


Extremities: No clubbing, No edema





- Results


Results: 





                               Laboratory Results











WBC  6.3 x10^3/uL (4.8-10.8)   01/13/23  04:45    


 


RBC  3.48 10^6/uL (4.20-5.40)  L  01/13/23  04:45    


 


Hgb  9.1 g/dL (12.0-16.0)  L  01/13/23  04:45    


 


Hct  29.1 % (37.0-47.0)  L  01/13/23  04:45    


 


MCV  83.6 fL (81.0-99.0)   01/13/23  04:45    


 


MCH  26.1 pg (27.0-31.0)  L  01/13/23  04:45    


 


MCHC  31.3 g/dL (32.0-36.0)  L  01/13/23  04:45    


 


RDW  15.0 % (12.0-15.0)   01/13/23  04:45    


 


Plt Count  98 10^3/uL (130-450)  L  01/13/23  04:45    


 


MPV  10.9 fL (7.9-10.8)  H  01/13/23  04:45    


 


Neut # (Auto)  5.1 10^3/uL (1.5-6.6)   01/13/23  04:45    


 


Lymph # (Auto)  0.8 10^3/uL (1.5-3.5)  L  01/13/23  04:45    


 


Mono # (Auto)  0.3 10^3/uL (0.0-1.0)   01/13/23  04:45    


 


Eos # (Auto)  0.1 10^3/uL (0.0-0.7)   01/13/23  04:45    


 


Baso # (Auto)  0.0 10^3/uL (0.0-0.1)   01/13/23  04:45    


 


Absolute Nucleated RBC  0.00 x10^3/uL  01/13/23  04:45    


 


Total Counted  100   01/10/23  17:56    


 


Band Neuts % (Manual)  10 % (0-10)  01/10/23  17:56    


 


Abnorm Lymph % (Manual)  0 %  01/10/23  17:56    


 


Nucleated RBC %  0.0 /100WBC  01/13/23  04:45    


 


Neutrophils # (Manual)  18.7 10^3/uL (1.5-6.6)  H  01/10/23  17:56    


 


Lymphocytes # (Manual)  1.0 10^3/uL (1.5-3.5)  L  01/10/23  17:56    


 


Monocytes # (Manual)  0.8 10^3/uL (0.0-1.0)   01/10/23  17:56    


 


Eosinophils # (Manual)  0.0 10^3/uL (0-0.7)   01/10/23  17:56    


 


Basophils # (Manual)  0.0 10^3/uL (0-0.1)   01/10/23  17:56    


 


Differential Comment  MANUAL DIFFERENTIAL   01/10/23  17:56    


 


WBC Morphology  1+ TOXIC GRANULATION  (NORMAL)   01/10/23  17:56    


 


Platelet Estimate  NORMAL (130-450,000)  (NORMAL)   01/10/23  17:56    


 


Platelet Morphology  NORMAL APPEARANCE  (NORMAL)   01/10/23  17:56    


 


RBC Morph Micro Appear  NORMAL APPEARANCE  (NORMAL)   01/10/23  17:56    


 


PT  14.5 secs (9.9-12.6)  H  01/10/23  17:56    


 


INR  1.3  (0.8-1.2)  H  01/10/23  17:56    


 


APTT  28.4 secs (24.9-33.3)   01/10/23  17:56    


 


Sodium  145 mmol/L (135-145)   01/13/23  04:45    


 


Potassium  3.3 mmol/L (3.5-5.0)  L  01/13/23  04:45    


 


Chloride  112 mmol/L (101-111)  H  01/13/23  04:45    


 


Carbon Dioxide  24 mmol/L (21-32)   01/13/23  04:45    


 


Anion Gap  9.0  (6-13)   01/13/23  04:45    


 


BUN  20 mg/dL (6-20)   01/13/23  04:45    


 


Creatinine  0.4 mg/dL (0.4-1.0)   01/13/23  04:45    


 


Estimated GFR (MDRD)  150  (>89)   01/13/23  04:45    


 


Glucose  86 mg/dL ()   01/13/23  04:45    


 


Lactic Acid  1.4 mmol/L (0.5-2.2)   01/11/23  01:12    


 


Calcium  8.3 mg/dL (8.5-10.3)  L  01/13/23  04:45    


 


Magnesium  1.9 mg/dL (1.7-2.8)   01/12/23  04:46    


 


Total Bilirubin  1.1 mg/dL (0.2-1.0)  H  01/10/23  17:56    


 


AST  14 IU/L (10-42)   01/10/23  17:56    


 


ALT  < 10 IU/L (10-60)  L  01/10/23  17:56    


 


Alkaline Phosphatase  56 IU/L ()   01/10/23  17:56    


 


Troponin I High Sens  9.6 ng/L (2.3-14.8)   01/11/23  15:56    


 


Total Protein  5.5 g/dL (6.7-8.2)  L  01/10/23  17:56    


 


Albumin  2.7 g/dL (3.2-5.5)  L  01/10/23  17:56    


 


Globulin  2.8 g/dL (2.1-4.2)   01/10/23  17:56    


 


Albumin/Globulin Ratio  1.0  (1.0-2.2)   01/10/23  17:56    


 


Lipase  21 U/L (22-51)  L  01/10/23  17:56    


 


Urine Color  YELLOW   01/10/23  17:41    


 


Urine Clarity  TURBID  (CLEAR)   01/10/23  17:41    


 


Urine pH  7.5 PH (5.0-7.5)   01/10/23  17:41    


 


Ur Specific Gravity  1.020  (1.002-1.030)   01/10/23  17:41    


 


Urine Protein  >=300 mg/dL (NEGATIVE)  H  01/10/23  17:41    


 


Urine Glucose (UA)  NEGATIVE mg/dL (NEGATIVE)   01/10/23  17:41    


 


Urine Ketones  TRACE mg/dL (NEGATIVE)   01/10/23  17:41    


 


Urine Occult Blood  LARGE  (NEGATIVE)  H  01/10/23  17:41    


 


Urine Nitrite  NEGATIVE  (NEGATIVE)   01/10/23  17:41    


 


Urine Bilirubin  NEGATIVE  (NEGATIVE)   01/10/23  17:41    


 


Urine Urobilinogen  1 (NORMAL) E.U./dL (NORMAL)   01/10/23  17:41    


 


Ur Leukocyte Esterase  SMALL  (NEGATIVE)  H  01/10/23  17:41    


 


Urine RBC  11-25 /HPF (0-5)  H  01/10/23  17:41    


 


Urine WBC  >25 /HPF (0-5)  H  01/10/23  17:41    


 


Ur Squamous Epith Cells  RARE Squamous  (<= Few)   01/10/23  17:41    


 


Urine Bacteria  Many /HPF (None Seen)  H  01/10/23  17:41    


 


Ur Microscopic Review  INDICATED   01/10/23  17:41    


 


Urine Culture Comments  INDICATED   01/10/23  17:41    


 


Nasal Adenovirus (PCR)  NOT DETECTED   01/10/23  17:46    


 


Nasal B. parapertussis DNA (PCR)  NOT DETECTED   01/10/23  17:46    


 


Nasal Coronavir 229E PCR  NOT DETECTED   01/10/23  17:46    


 


Nasal Coronavir HKU1 PCR  NOT DETECTED   01/10/23  17:46    


 


Nasal Coronavir NL63 PCR  NOT DETECTED   01/10/23  17:46    


 


Nasal Coronavir OC43 PCR  NOT DETECTED   01/10/23  17:46    


 


Nasal Enterovir/Rhinovir PCR  NOT DETECTED   01/10/23  17:46    


 


Nasal Influenza B PCR  NOT DETECTED   01/10/23  17:46    


 


Nasal Influenza A PCR  NOT DETECTED   01/10/23  17:46    


 


Nasal Parainfluen 1 PCR  NOT DETECTED   01/10/23  17:46    


 


Nasal Parainfluen 2 PCR  NOT DETECTED   01/10/23  17:46    


 


Nasal Parainfluen 3 PCR  NOT DETECTED   01/10/23  17:46    


 


Nasal Parainfluen 4 PCR  NOT DETECTED   01/10/23  17:46    


 


Nasal RSV (PCR)  DETECTED  A  01/10/23  17:46    


 


Nasal B.pertussis DNA PCR  NOT DETECTED   01/10/23  17:46    


 


Nasal C.pneumoniae (PCR)  NOT DETECTED   01/10/23  17:46    


 


Cande Human Metapneumo PCR  NOT DETECTED   01/10/23  17:46    


 


Nasal M.pneumoniae (PCR)  NOT DETECTED   01/10/23  17:46    


 


Nasal SARS-CoV-2 (PCR)  NOT DETECTED   01/10/23  17:46    


 


SARS-CoV-2 (PCR)  NOT DETECTED   01/13/23  11:50

## 2023-01-14 LAB
ANION GAP SERPL CALCULATED.4IONS-SCNC: 7 MMOL/L (ref 6–13)
BASOPHILS NFR BLD AUTO: 0 10^3/UL (ref 0–0.1)
BASOPHILS NFR BLD AUTO: 0.3 %
BUN SERPL-MCNC: 14 MG/DL (ref 6–20)
CALCIUM UR-MCNC: 8.2 MG/DL (ref 8.5–10.3)
CHLORIDE SERPL-SCNC: 109 MMOL/L (ref 101–111)
CO2 SERPL-SCNC: 27 MMOL/L (ref 21–32)
CREAT SERPLBLD-SCNC: 0.4 MG/DL (ref 0.4–1)
EOSINOPHIL # BLD AUTO: 0.2 10^3/UL (ref 0–0.7)
EOSINOPHIL NFR BLD AUTO: 2.6 %
ERYTHROCYTE [DISTWIDTH] IN BLOOD BY AUTOMATED COUNT: 15.1 % (ref 12–15)
GFRSERPLBLD MDRD-ARVRAT: 150 ML/MIN/{1.73_M2} (ref 89–?)
GLUCOSE SERPL-MCNC: 92 MG/DL (ref 70–100)
HCT VFR BLD AUTO: 28.6 % (ref 37–47)
HGB UR QL STRIP: 8.9 G/DL (ref 12–16)
LYMPHOCYTES # SPEC AUTO: 1 10^3/UL (ref 1.5–3.5)
LYMPHOCYTES NFR BLD AUTO: 17.1 %
MCH RBC QN AUTO: 25.7 PG (ref 27–31)
MCHC RBC AUTO-ENTMCNC: 31.1 G/DL (ref 32–36)
MCV RBC AUTO: 82.7 FL (ref 81–99)
MONOCYTES # BLD AUTO: 0.4 10^3/UL (ref 0–1)
MONOCYTES NFR BLD AUTO: 5.8 %
NEUTROPHILS # BLD AUTO: 4.4 10^3/UL (ref 1.5–6.6)
NEUTROPHILS # SNV AUTO: 6 X10^3/UL (ref 4.8–10.8)
NEUTROPHILS NFR BLD AUTO: 72.2 %
NRBC # BLD AUTO: 0 /100WBC
NRBC # BLD AUTO: 0 X10^3/UL
PDW BLD AUTO: 11 FL (ref 7.9–10.8)
PLATELET # BLD: 126 10^3/UL (ref 130–450)
POTASSIUM SERPL-SCNC: 3.3 MMOL/L (ref 3.5–5)
RBC MAR: 3.46 10^6/UL (ref 4.2–5.4)
SODIUM SERPLBLD-SCNC: 143 MMOL/L (ref 135–145)

## 2023-01-14 RX ADMIN — SODIUM CHLORIDE, PRESERVATIVE FREE SCH ML: 5 INJECTION INTRAVENOUS at 00:40

## 2023-01-14 RX ADMIN — CARBIDOPA AND LEVODOPA SCH TAB: 25; 100 TABLET ORAL at 06:03

## 2023-01-14 RX ADMIN — CARBIDOPA AND LEVODOPA SCH TAB: 25; 100 TABLET, EXTENDED RELEASE ORAL at 10:01

## 2023-01-14 RX ADMIN — ASPIRIN SCH MG: 81 TABLET, COATED ORAL at 10:01

## 2023-01-14 RX ADMIN — DILTIAZEM HYDROCHLORIDE SCH: 120 CAPSULE, COATED, EXTENDED RELEASE ORAL at 21:11

## 2023-01-14 RX ADMIN — POTASSIUM CHLORIDE SCH MLS/HR: 7.46 INJECTION, SOLUTION INTRAVENOUS at 11:48

## 2023-01-14 RX ADMIN — CARBIDOPA AND LEVODOPA SCH TAB: 25; 100 TABLET ORAL at 15:25

## 2023-01-14 RX ADMIN — CARBIDOPA AND LEVODOPA SCH TAB: 25; 100 TABLET ORAL at 10:52

## 2023-01-14 RX ADMIN — SODIUM CHLORIDE, PRESERVATIVE FREE SCH ML: 5 INJECTION INTRAVENOUS at 18:06

## 2023-01-14 RX ADMIN — PRAMIPEXOLE DIHYDROCHLORIDE SCH MG: 0.25 TABLET ORAL at 21:10

## 2023-01-14 RX ADMIN — Medication SCH MCG: at 10:04

## 2023-01-14 RX ADMIN — SODIUM CHLORIDE, PRESERVATIVE FREE SCH ML: 5 INJECTION INTRAVENOUS at 10:07

## 2023-01-14 RX ADMIN — CARBIDOPA AND LEVODOPA SCH TAB: 25; 100 TABLET ORAL at 18:05

## 2023-01-14 RX ADMIN — DILTIAZEM HYDROCHLORIDE SCH: 120 CAPSULE, COATED, EXTENDED RELEASE ORAL at 11:13

## 2023-01-14 RX ADMIN — METOPROLOL SUCCINATE SCH MG: 25 TABLET, EXTENDED RELEASE ORAL at 10:04

## 2023-01-14 RX ADMIN — METOPROLOL SUCCINATE SCH MG: 25 TABLET, EXTENDED RELEASE ORAL at 21:10

## 2023-01-14 RX ADMIN — MIRTAZAPINE SCH MG: 15 TABLET, FILM COATED ORAL at 21:11

## 2023-01-14 RX ADMIN — CARBIDOPA AND LEVODOPA SCH TAB: 25; 100 TABLET, EXTENDED RELEASE ORAL at 21:10

## 2023-01-14 RX ADMIN — CARBIDOPA AND LEVODOPA SCH TAB: 25; 100 TABLET ORAL at 21:10

## 2023-01-14 RX ADMIN — DEXTROSE MONOHYDRATE SCH MLS/HR: 50 INJECTION, SOLUTION INTRAVENOUS at 10:00

## 2023-01-14 RX ADMIN — DILTIAZEM HYDROCHLORIDE SCH: 120 CAPSULE, COATED, EXTENDED RELEASE ORAL at 11:12

## 2023-01-14 RX ADMIN — THERA TABS SCH TAB: TAB at 10:06

## 2023-01-14 RX ADMIN — POTASSIUM CHLORIDE SCH MLS/HR: 7.46 INJECTION, SOLUTION INTRAVENOUS at 10:46

## 2023-01-14 NOTE — PROVIDER PROGRESS NOTE
Assessment/Plan





- Problem List


(1) HCAP (healthcare-associated pneumonia)


Assessment/Plan: 


This patient is a resident of a nursing home. When she complained of a new cough

and pleuritic chest pain on 1/11, a chest x-ray was repeated and this showed a 

new pneumonia on the left side. It was not seen on the admission CXR, probably 

because she was dehydrated.


She never brought up any sputum to send for culture.  Blood cultures have been 

negative to date


She was already on ceftriaxone for UTI.


Zithromax  was added and she completed a course of Zithro .


Plan:


Continue iv Ceftriaxone for complete treatment of the PNA. Alternatively she can

be put on Ceftin or TMP sulfa orally


Cont Mucinex


Since yesterday she can be discharged to her nursing home residence facility.  

Social work contacted them yesterday (Fri), but they need to get a new approval 

from her Insurances for her to return, and no progress is made on Sat or Sun, 

today is Sat.





(2) E. coli UTI


Abnormal UA at admission  This appeared to be the source of her sepsis and AMS 

at presentation.


Blood cultures are negative to date


Her urine culture has grown E. coli and sensitivities are available: It is 

resistant to the quinolones, sens to Keflex, Ceftriaxone and Bactrim


Plan:


Continue with IV Ceftriaxone, since she still needs to be on it for the 

pneumonia.  If she is to be discharged back to her nursing home soon, we can 

change her over to oral Bactrim DS (this was reviewed with the pharmacist)





(3) RSV infection


Her chest x-ray did not show any infiltrate at admission, however the daughter 

reported on day #2 that the patient complained of a cough that she cannot 

expectorate. The repeat chest x-ray did show a new left-sided infiltrate. 

Mucinex was started.


Plan:


Cont Mucinex.





(4) Hypotension


BP was running 90 syst, today is 100 syst


Her "soft" BP persists and was consistent with infection and volume depletion. 


We did obtain an Echocardiogram which showed: Normal LV and RV chamber sizes, 

normal LV and RV systolic function.  She has mild mitral and tricuspid 

regurgitation, PA pressure 35 to 40 mmHg and a patent foramen ovale present with

left-to-right shunt


Plan:


We tapered her IV fluids to off, since she is eating and drinking well


Will continue to hold any meds other than Metoprolol, that drop blood pressure





(5) Afib


An EKG was done on the day she complained of chest pain.  The baseline had 

motion artifact (because of her Parkinson's tremor) but probably shows A. fib.  

It is unknown if she has had A. fib documented before. She has never had an EKG 

here, to have for comparison.


Her heart rate is occasionally documented at being 110, mostly it is under 100.


He reconciled medication list shows that she was already on metoprolol 25 mg qpm

and 1 baby aspirin daily


Plan:


Cont 1 baby aspirin daily for her stroke prophylaxis.


We have increased her metoprolol slightly, for better rate control





(6) Parkinson's disease with behavioral disturbances


The daughter reported that the patient normally has hallucinations, even when 

she is at her best and on her Parkinson treatment


Plan:


Continue her usual Parkinsons meds and any psych medications





(7) PFO


The Echo shows a left to right shunt, it is not going right to left which would 

give her a higher risk for an systemic emboli


Plan:


She is not a candidate for PFO closure, given her age and comorbidities





(8) Bedbound


Report was received that she is bedbound, transfers to a wheelchair using a Jaycee

lift.


Plan:


Continue to get her OOB daily using a lift


No PT or OT rehab have been ordered


She can be discharged to her nursing home residence facility.  Social work has 

contacted them but they need to get a new approval from her Insurances for her 

to return





(9) Hx of HTN


As per history.


Plan:


We are still hold any meds that drop her blood pressure





(10) AMS


Resolved. She is not as somnolent, is awake, she only answers minimally to str

angers but more easily to family, according to the daughter.


Plan:


Continue to treat the underlying cause for her confusion and somnolence, her 

infections.





(11) Pleuritic chest pain


Assessment/Plan: 


Improved


On Day #2 the patient had several episodes of chest pain in the center chest 

that she rated 8/10.  She could not take a deep breath, due to the pain, tim

ggesting that it was pleuritic pain.  A CXR was done that showed a new 

pneumonia, which developed after a day of iv hydration.


We cycled troponins and these were entirely normal


A stat EKG was done and showed A. fib and no ischemic changes


Plan:


We will continue Tylenol for pain control, and try to avoid nercotics that may 

cause somnolence and confusion.





(12) Severe sepsis


Resolved, along with improved mental status and WBC


Plan:


Continue with IV antibiotics











- Current Meds


Current Meds: 





                               Current Medications











Generic Name Dose Route Start Last Admin





  Trade Name Ismaelq  PRN Reason Stop Dose Admin


 


Acetaminophen  650 mg  01/10/23 20:03  01/13/23 09:27





  Acetaminophen 325 Mg Tablet  PO   650 mg





  Q4HR PRN   Administration





  Pain 1 to 4, or Fever  


 


Aspirin  81 mg  01/12/23 09:00  01/14/23 10:01





  Aspirin Ec 81 Mg Tablet  PO   81 mg





  DAILY CALDERON   Administration


 


Carbidopa/Levodopa  1 tab  01/11/23 14:00  01/14/23 10:01





  Carbidopa/Levodopa Er 25 Mg/100 Mg Tablet  PO   1 tab





  BID CALDERON   Administration


 


Carbidopa/Levodopa  1 tab  01/11/23 14:00  01/14/23 18:05





  Carbidopa/Levodopa 25 Mg/100 Mg Tablet  PO   1 tab





  5XD CALDERON   Administration


 


Cholecalciferol  50 mcg  01/12/23 09:00  01/14/23 10:04





  Cholecalciferol 25 Mcg Tablet  PO   50 mcg





  DAILY CALDERON   Administration


 


Estrogens Conjugated  0.498 applic  01/13/23 09:00  01/13/23 09:59





  Estrogens, Conjugated Cream 30 Gm Tube  VG   0.5 gm





  MOFR CALDERON   Administration


 


Guaifenesin  600 mg  01/11/23 21:00  01/14/23 10:01





  Guaifenesin 600 Mg Tablet  PO   600 mg





  BID CALDERON   Administration


 


Ceftriaxone Sodium 1 gm/  100 mls @ 200 mls/hr  01/14/23 09:00  01/14/23 10:47





  Sodium Chloride  IV   Infused





  DAILY CALDERON   Infusion


 


Lidocaine  1 patch  01/11/23 13:23  01/12/23 10:00





  Lidocaine Patch 5%  TOP   1 patch





  DAILY PRN   Administration





  PAIN  


 


Metoprolol Succinate  12.5 mg  01/14/23 09:00  01/14/23 10:04





  Metoprolol Succinate 25 Mg Tablet  PO   12.5 mg





  DAILY CALDERON   Administration


 


Metoprolol Succinate  25 mg  01/13/23 21:00  01/13/23 21:51





  Metoprolol Succinate 25 Mg Tablet  PO   25 mg





  QPM CALDERON   Administration


 


Mirtazapine  7.5 mg  01/13/23 21:00  01/13/23 21:51





  Mirtazapine 15 Mg Tablet  PO   7.5 mg





  QPM CALDERON   Administration


 


Multivitamins  1 tab  01/12/23 08:00  01/14/23 10:06





  Multivitamin Tablet  PO   1 tab





  DAILYWM CALDERON   Administration


 


**Ipratropium  2 each  01/11/23 21:00  01/14/23 11:12





Bromide [Ipratropium  CANDE   Not Given





Bromide] 15 Ml  BID CALDERON  





Spray**   


 


Patient Own Med (D-  1 each  01/12/23 09:00  01/14/23 11:13





Mannose [Azo D-  PO   Not Given





Mannose] 500 Mg  DAILY CALDERON  





Capsule)   


 


Polyethylene Glycol  17 gm  01/12/23 09:00  01/14/23 10:07





  Polyethylene Glycol 3350 17 Gm Packet  PO   17 gm





  DAILY CALDERON   Administration


 


Pramipexole Dihydrochloride  0.25 mg  01/13/23 21:00  01/13/23 21:52





  Pramipexole 0.25 Mg Tablet  PO   0.25 mg





  QPM CALDERON   Administration


 


Senna  17.2 mg  01/12/23 09:00  01/13/23 09:27





  Senna 8.6 Mg Tablet  PO   17.2 mg





  DAILY CALDERON   Administration


 


Sodium Chloride  10 ml  01/11/23 01:00  01/14/23 18:06





  Sodium Chloride Flush 0.9% 10 Ml Syringe  IVP   10 ml





  0100,0900,1700 CALDERON   Administration














- Lab Result


Fish Bone Diagrams: 


                                 01/14/23 04:53





                                 01/14/23 04:53





- Additional Planning


My Orders: 





My Active Orders





01/13/23 21:00


Metoprolol Succinate [Toprol Xl]   25 mg PO QPM 


Mirtazapine [Remeron]   7.5 mg PO QPM 


Pramipexole [Mirapex]   0.25 mg PO QPM 





01/14/23 09:00


Metoprolol Succinate [Toprol Xl]   12.5 mg PO DAILY 


cefTRIAXone [Rocephin] 1 gm   Sodium Chloride 0.9% Minibag [Normal Saline 0.9% 

Minibag] 100 ml IV DAILY 














Subjective





- Subjective


Patient Reports: Resting Comfortably





Objective


Vital Signs: 





                               Vital Signs - 24 hr











  01/13/23 01/14/23 01/14/23





  23:56 08:06 16:00


 


Temperature 37.0 C 36.9 C 36.6 C


 


Heart Rate [ 85 72 82





Brachial]   


 


Respiratory 18 20 22





Rate   


 


Blood Pressure 118/83 H 101/58 L 101/75





[Right Brachial   





artery]   


 


O2 Saturation 95 96 98








                                     Oxygen











O2 Source                      Room air














I&O (Last 24 Hrs): 





                          Intake and Output Totals x24h











 01/12/23 01/13/23 01/14/23





 23:59 23:59 23:59


 


Intake Total 3667.083 3436.667 830


 


Balance 3667.083 3436.667 830











General: Alert, No acute distress


HEENT: EOMI, Mucous membr. moist/pink


Neck: Supple


Neuro: Alert, Disoriented, Non Focal


Cardiovascular: No murmurs


Respiratory: No respiratory distress


Abdomen: Soft


Extremities: No edema, No tenderness/swelling





- Results


Results: 





                               Laboratory Results











WBC  6.0 x10^3/uL (4.8-10.8)   01/14/23  04:53    


 


RBC  3.46 10^6/uL (4.20-5.40)  L  01/14/23  04:53    


 


Hgb  8.9 g/dL (12.0-16.0)  L  01/14/23  04:53    


 


Hct  28.6 % (37.0-47.0)  L  01/14/23  04:53    


 


MCV  82.7 fL (81.0-99.0)   01/14/23  04:53    


 


MCH  25.7 pg (27.0-31.0)  L  01/14/23  04:53    


 


MCHC  31.1 g/dL (32.0-36.0)  L  01/14/23  04:53    


 


RDW  15.1 % (12.0-15.0)  H  01/14/23  04:53    


 


Plt Count  126 10^3/uL (130-450)  L  01/14/23  04:53    


 


MPV  11.0 fL (7.9-10.8)  H  01/14/23  04:53    


 


Neut # (Auto)  4.4 10^3/uL (1.5-6.6)   01/14/23  04:53    


 


Lymph # (Auto)  1.0 10^3/uL (1.5-3.5)  L  01/14/23  04:53    


 


Mono # (Auto)  0.4 10^3/uL (0.0-1.0)   01/14/23  04:53    


 


Eos # (Auto)  0.2 10^3/uL (0.0-0.7)   01/14/23  04:53    


 


Baso # (Auto)  0.0 10^3/uL (0.0-0.1)   01/14/23  04:53    


 


Absolute Nucleated RBC  0.00 x10^3/uL  01/14/23  04:53    


 


Total Counted  100   01/10/23  17:56    


 


Band Neuts % (Manual)  10 % (0-10)  01/10/23  17:56    


 


Abnorm Lymph % (Manual)  0 %  01/10/23  17:56    


 


Nucleated RBC %  0.0 /100WBC  01/14/23  04:53    


 


Neutrophils # (Manual)  18.7 10^3/uL (1.5-6.6)  H  01/10/23  17:56    


 


Lymphocytes # (Manual)  1.0 10^3/uL (1.5-3.5)  L  01/10/23  17:56    


 


Monocytes # (Manual)  0.8 10^3/uL (0.0-1.0)   01/10/23  17:56    


 


Eosinophils # (Manual)  0.0 10^3/uL (0-0.7)   01/10/23  17:56    


 


Basophils # (Manual)  0.0 10^3/uL (0-0.1)   01/10/23  17:56    


 


Differential Comment  MANUAL DIFFERENTIAL   01/10/23  17:56    


 


WBC Morphology  1+ TOXIC GRANULATION  (NORMAL)   01/10/23  17:56    


 


Platelet Estimate  NORMAL (130-450,000)  (NORMAL)   01/10/23  17:56    


 


Platelet Morphology  NORMAL APPEARANCE  (NORMAL)   01/10/23  17:56    


 


RBC Morph Micro Appear  NORMAL APPEARANCE  (NORMAL)   01/10/23  17:56    


 


PT  14.5 secs (9.9-12.6)  H  01/10/23  17:56    


 


INR  1.3  (0.8-1.2)  H  01/10/23  17:56    


 


APTT  28.4 secs (24.9-33.3)   01/10/23  17:56    


 


Sodium  143 mmol/L (135-145)   01/14/23  04:53    


 


Potassium  3.3 mmol/L (3.5-5.0)  L  01/14/23  04:53    


 


Chloride  109 mmol/L (101-111)   01/14/23  04:53    


 


Carbon Dioxide  27 mmol/L (21-32)   01/14/23  04:53    


 


Anion Gap  7.0  (6-13)   01/14/23  04:53    


 


BUN  14 mg/dL (6-20)   01/14/23  04:53    


 


Creatinine  0.4 mg/dL (0.4-1.0)   01/14/23  04:53    


 


Estimated GFR (MDRD)  150  (>89)   01/14/23  04:53    


 


Glucose  92 mg/dL ()   01/14/23  04:53    


 


Lactic Acid  1.4 mmol/L (0.5-2.2)   01/11/23  01:12    


 


Calcium  8.2 mg/dL (8.5-10.3)  L  01/14/23  04:53    


 


Magnesium  1.9 mg/dL (1.7-2.8)   01/12/23  04:46    


 


Total Bilirubin  1.1 mg/dL (0.2-1.0)  H  01/10/23  17:56    


 


AST  14 IU/L (10-42)   01/10/23  17:56    


 


ALT  < 10 IU/L (10-60)  L  01/10/23  17:56    


 


Alkaline Phosphatase  56 IU/L ()   01/10/23  17:56    


 


Troponin I High Sens  9.6 ng/L (2.3-14.8)   01/11/23  15:56    


 


Total Protein  5.5 g/dL (6.7-8.2)  L  01/10/23  17:56    


 


Albumin  2.7 g/dL (3.2-5.5)  L  01/10/23  17:56    


 


Globulin  2.8 g/dL (2.1-4.2)   01/10/23  17:56    


 


Albumin/Globulin Ratio  1.0  (1.0-2.2)   01/10/23  17:56    


 


Lipase  21 U/L (22-51)  L  01/10/23  17:56    


 


Urine Color  YELLOW   01/10/23  17:41    


 


Urine Clarity  TURBID  (CLEAR)   01/10/23  17:41    


 


Urine pH  7.5 PH (5.0-7.5)   01/10/23  17:41    


 


Ur Specific Gravity  1.020  (1.002-1.030)   01/10/23  17:41    


 


Urine Protein  >=300 mg/dL (NEGATIVE)  H  01/10/23  17:41    


 


Urine Glucose (UA)  NEGATIVE mg/dL (NEGATIVE)   01/10/23  17:41    


 


Urine Ketones  TRACE mg/dL (NEGATIVE)   01/10/23  17:41    


 


Urine Occult Blood  LARGE  (NEGATIVE)  H  01/10/23  17:41    


 


Urine Nitrite  NEGATIVE  (NEGATIVE)   01/10/23  17:41    


 


Urine Bilirubin  NEGATIVE  (NEGATIVE)   01/10/23  17:41    


 


Urine Urobilinogen  1 (NORMAL) E.U./dL (NORMAL)   01/10/23  17:41    


 


Ur Leukocyte Esterase  SMALL  (NEGATIVE)  H  01/10/23  17:41    


 


Urine RBC  11-25 /HPF (0-5)  H  01/10/23  17:41    


 


Urine WBC  >25 /HPF (0-5)  H  01/10/23  17:41    


 


Ur Squamous Epith Cells  RARE Squamous  (<= Few)   01/10/23  17:41    


 


Urine Bacteria  Many /HPF (None Seen)  H  01/10/23  17:41    


 


Ur Microscopic Review  INDICATED   01/10/23  17:41    


 


Urine Culture Comments  INDICATED   01/10/23  17:41    


 


Nasal Adenovirus (PCR)  NOT DETECTED   01/10/23  17:46    


 


Nasal B. parapertussis DNA (PCR)  NOT DETECTED   01/10/23  17:46    


 


Nasal Coronavir 229E PCR  NOT DETECTED   01/10/23  17:46    


 


Nasal Coronavir HKU1 PCR  NOT DETECTED   01/10/23  17:46    


 


Nasal Coronavir NL63 PCR  NOT DETECTED   01/10/23  17:46    


 


Nasal Coronavir OC43 PCR  NOT DETECTED   01/10/23  17:46    


 


Nasal Enterovir/Rhinovir PCR  NOT DETECTED   01/10/23  17:46    


 


Nasal Influenza B PCR  NOT DETECTED   01/10/23  17:46    


 


Nasal Influenza A PCR  NOT DETECTED   01/10/23  17:46    


 


Nasal Parainfluen 1 PCR  NOT DETECTED   01/10/23  17:46    


 


Nasal Parainfluen 2 PCR  NOT DETECTED   01/10/23  17:46    


 


Nasal Parainfluen 3 PCR  NOT DETECTED   01/10/23  17:46    


 


Nasal Parainfluen 4 PCR  NOT DETECTED   01/10/23  17:46    


 


Nasal RSV (PCR)  DETECTED  A  01/10/23  17:46    


 


Nasal B.pertussis DNA PCR  NOT DETECTED   01/10/23  17:46    


 


Nasal C.pneumoniae (PCR)  NOT DETECTED   01/10/23  17:46    


 


Cande Human Metapneumo PCR  NOT DETECTED   01/10/23  17:46    


 


Nasal M.pneumoniae (PCR)  NOT DETECTED   01/10/23  17:46    


 


Nasal SARS-CoV-2 (PCR)  NOT DETECTED   01/10/23  17:46    


 


SARS-CoV-2 (PCR)  NOT DETECTED   01/13/23  11:50

## 2023-01-15 RX ADMIN — SENNOSIDES SCH MG: 8.6 TABLET, FILM COATED ORAL at 08:46

## 2023-01-15 RX ADMIN — MIRTAZAPINE SCH MG: 15 TABLET, FILM COATED ORAL at 22:03

## 2023-01-15 RX ADMIN — METOPROLOL SUCCINATE SCH MG: 25 TABLET, EXTENDED RELEASE ORAL at 22:04

## 2023-01-15 RX ADMIN — METOPROLOL SUCCINATE SCH MG: 25 TABLET, EXTENDED RELEASE ORAL at 08:45

## 2023-01-15 RX ADMIN — SODIUM CHLORIDE, PRESERVATIVE FREE SCH ML: 5 INJECTION INTRAVENOUS at 08:45

## 2023-01-15 RX ADMIN — DILTIAZEM HYDROCHLORIDE SCH: 120 CAPSULE, COATED, EXTENDED RELEASE ORAL at 08:50

## 2023-01-15 RX ADMIN — ACETAMINOPHEN PRN MG: 325 TABLET ORAL at 08:57

## 2023-01-15 RX ADMIN — DILTIAZEM HYDROCHLORIDE SCH: 120 CAPSULE, COATED, EXTENDED RELEASE ORAL at 22:06

## 2023-01-15 RX ADMIN — CARBIDOPA AND LEVODOPA SCH TAB: 25; 100 TABLET ORAL at 14:07

## 2023-01-15 RX ADMIN — SODIUM CHLORIDE, PRESERVATIVE FREE SCH ML: 5 INJECTION INTRAVENOUS at 18:46

## 2023-01-15 RX ADMIN — PRAMIPEXOLE DIHYDROCHLORIDE SCH MG: 0.25 TABLET ORAL at 22:04

## 2023-01-15 RX ADMIN — Medication SCH MCG: at 08:46

## 2023-01-15 RX ADMIN — THERA TABS SCH TAB: TAB at 08:46

## 2023-01-15 RX ADMIN — SODIUM CHLORIDE, PRESERVATIVE FREE SCH ML: 5 INJECTION INTRAVENOUS at 01:23

## 2023-01-15 RX ADMIN — CARBIDOPA AND LEVODOPA SCH TAB: 25; 100 TABLET ORAL at 05:14

## 2023-01-15 RX ADMIN — CARBIDOPA AND LEVODOPA SCH TAB: 25; 100 TABLET ORAL at 18:46

## 2023-01-15 RX ADMIN — CARBIDOPA AND LEVODOPA SCH TAB: 25; 100 TABLET, EXTENDED RELEASE ORAL at 22:03

## 2023-01-15 RX ADMIN — CARBIDOPA AND LEVODOPA SCH TAB: 25; 100 TABLET ORAL at 22:03

## 2023-01-15 RX ADMIN — CARBIDOPA AND LEVODOPA SCH TAB: 25; 100 TABLET, EXTENDED RELEASE ORAL at 08:45

## 2023-01-15 RX ADMIN — DEXTROSE MONOHYDRATE SCH MLS/HR: 50 INJECTION, SOLUTION INTRAVENOUS at 08:46

## 2023-01-15 RX ADMIN — CARBIDOPA AND LEVODOPA SCH TAB: 25; 100 TABLET ORAL at 10:43

## 2023-01-15 RX ADMIN — ASPIRIN SCH MG: 81 TABLET, COATED ORAL at 08:45

## 2023-01-15 NOTE — PROVIDER PROGRESS NOTE
Assessment/Plan





- Problem List


(1) HCAP (healthcare-associated pneumonia)


Assessment/Plan: 


This patient is a resident of a nursing home. When she complained of a new cough

and pleuritic chest pain on 1/11, a chest x-ray was repeated and this showed a 

new pneumonia on the left side. It was not seen on the admission CXR, probably 

because she was dehydrated.


She never brought up any sputum to send for culture.  Blood cultures have been 

negative to date


She was already on ceftriaxone for UTI.


Zithromax  was added and she completed a course of Zithro .


Plan:


Cont Mucinex


Continue iv Ceftriaxone for complete treatment of the PNA. Alternatively she can

be put on Ceftin orally or TMP sulfa orally, if the NH is ready to accept her 

back


Since 1/13 she has been medically stable to be discharged to her nursing home 

residence facility.  Social work contacted them 1/13. When they called us back 

they said they need to get a new approval from her Insurances for her to return,

and no progress was made on weekends, today is Sun.





(2) E. coli UTI


Abnormal UA at admission  This appeared to be the source of her sepsis and AMS 

at presentation.


Blood cultures are negative to date


Her urine culture has grown E. coli and sensitivities are available: It is 

resistant to the quinolones, sens to Keflex, Ceftriaxone and Bactrim


Plan:


Continue with IV Ceftriaxone, since she still could be on it for the pneumonia. 

If she is to be discharged back to her nursing home imminently, we can change 

her over to oral Bactrim DS (this was reviewed with the pharmacist)





(3) RSV infection


Her chest x-ray did not show any infiltrate at admission, however the daughter 

reported on day #2 that the patient complained of a cough that she cannot 

expectorate. The repeat chest x-ray did show a new left-sided infiltrate. 

Mucinex was started.


Plan:


Cont Mucinex.





(4) Hypotension


BP was running 90 syst, today is 100 syst


Her "soft" BP persists and was consistent with infection and volume depletion. 


We did obtain an Echocardiogram which showed: Normal LV and RV chamber sizes, 

normal LV and RV systolic function.  She has mild mitral and tricuspid regu

rgitation, PA pressure 35 to 40 mmHg and a patent foramen ovale present with 

left-to-right shunt


Plan:


We tapered her IV fluids to off, since she is eating and drinking well


Will continue to hold any meds other than Metoprolol, that drop blood pressure





(5) Afib


An EKG was done on the day she complained of chest pain.  The baseline had 

motion artifact (because of her Parkinson's tremor) but probably shows A. fib.  

It is unknown if she has had A. fib documented before. She has never had an EKG 

here, to have for comparison.


Her heart rate is occasionally documented at being 110, mostly it is under 100.


He reconciled medication list shows that she was already on metoprolol 25 mg qpm

and 1 baby aspirin daily


Plan:


Cont 1 baby aspirin daily for her stroke prophylaxis.


We have increased her metoprolol slightly, for better rate control





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


The daughter reported that the patient normally has hallucinations, even when 

she is at her best and on her Parkinson treatment


Plan:


Continue her usual Parkinsons meds and any psych medications





(7) PFO


The Echo shows a left to right shunt, it is not going right to left which would 

give her a higher risk for an systemic emboli


Plan:


She is not a candidate for PFO closure, given her age and comorbidities





(8) Bedbound


Report was received that she is bedbound, transfers to a wheelchair using a Jaycee

lift.


Plan:


Continue to get her OOB daily using a lift


No PT or OT rehab have been ordered


She can be discharged to her nursing home residence facility.  Social work has 

contacted them but they need to get a new approval from her Insurances for her 

to return





(9) Sacral decubitus, stage 2


As per photos


Plan:


Plavix dressing changes daily





(10) Hx of HTN


As per history.


Plan:


We are still hold any meds that drop her blood pressure





(11) AMS


Resolved. She is not as somnolent, is awake, she only answers minimally to 

strangers but more easily to family, according to the daughter.


Plan:


Continue to treat the underlying cause for her confusion and somnolence, her 

infections.





(12) Pleuritic chest pain


Assessment/Plan: 


Resolved once the pneumonia was treated





(13) Severe sepsis


Resolved, along with improved mental status and WBC


Plan:


Continue with IV antibiotics











- Current Meds


Current Meds: 





                               Current Medications











Generic Name Dose Route Start Last Admin





  Trade Name Freq  PRN Reason Stop Dose Admin


 


Acetaminophen  650 mg  01/10/23 20:03  01/13/23 09:27





  Acetaminophen 325 Mg Tablet  PO   650 mg





  Q4HR PRN   Administration





  Pain 1 to 4, or Fever  


 


Aspirin  81 mg  01/12/23 09:00  01/14/23 10:01





  Aspirin Ec 81 Mg Tablet  PO   81 mg





  DAILY CALDERON   Administration


 


Carbidopa/Levodopa  1 tab  01/11/23 14:00  01/14/23 21:10





  Carbidopa/Levodopa Er 25 Mg/100 Mg Tablet  PO   1 tab





  BID CALDERON   Administration


 


Carbidopa/Levodopa  1 tab  01/11/23 14:00  01/15/23 05:14





  Carbidopa/Levodopa 25 Mg/100 Mg Tablet  PO   1 tab





  5XD CALDERON   Administration


 


Cholecalciferol  50 mcg  01/12/23 09:00  01/14/23 10:04





  Cholecalciferol 25 Mcg Tablet  PO   50 mcg





  DAILY CALDERON   Administration


 


Estrogens Conjugated  0.498 applic  01/13/23 09:00  01/13/23 09:59





  Estrogens, Conjugated Cream 30 Gm Tube  VG   0.5 gm





  MOFR CALDERON   Administration


 


Guaifenesin  600 mg  01/11/23 21:00  01/14/23 21:10





  Guaifenesin 600 Mg Tablet  PO   600 mg





  BID CALDERON   Administration


 


Ceftriaxone Sodium 1 gm/  100 mls @ 200 mls/hr  01/14/23 09:00  01/14/23 10:47





  Sodium Chloride  IV   Infused





  DAILY CALDERON   Infusion


 


Lidocaine  1 patch  01/11/23 13:23  01/12/23 10:00





  Lidocaine Patch 5%  TOP   1 patch





  DAILY PRN   Administration





  PAIN  


 


Metoprolol Succinate  12.5 mg  01/14/23 09:00  01/14/23 10:04





  Metoprolol Succinate 25 Mg Tablet  PO   12.5 mg





  DAILY CALDERON   Administration


 


Metoprolol Succinate  25 mg  01/13/23 21:00  01/14/23 21:10





  Metoprolol Succinate 25 Mg Tablet  PO   25 mg





  QPM CALDERON   Administration


 


Mirtazapine  7.5 mg  01/13/23 21:00  01/14/23 21:11





  Mirtazapine 15 Mg Tablet  PO   7.5 mg





  QPM CALDERON   Administration


 


Multivitamins  1 tab  01/12/23 08:00  01/14/23 10:06





  Multivitamin Tablet  PO   1 tab





  DAILYWM CALDERON   Administration


 


**Ipratropium  2 each  01/11/23 21:00  01/14/23 21:11





Bromide [Ipratropium  CANDE   Not Given





Bromide] 15 Ml  BID CALDERON  





Spray**   


 


Patient Own Med (D-  1 each  01/12/23 09:00  01/14/23 11:13





Mannose [Azo D-  PO   Not Given





Mannose] 500 Mg  DAILY CALDERON  





Capsule)   


 


Polyethylene Glycol  17 gm  01/12/23 09:00  01/14/23 10:07





  Polyethylene Glycol 3350 17 Gm Packet  PO   17 gm





  DAILY CALDERON   Administration


 


Pramipexole Dihydrochloride  0.25 mg  01/13/23 21:00  01/14/23 21:10





  Pramipexole 0.25 Mg Tablet  PO   0.25 mg





  QPM CALDERON   Administration


 


Senna  17.2 mg  01/12/23 09:00  01/13/23 09:27





  Senna 8.6 Mg Tablet  PO   17.2 mg





  DAILY CALDERON   Administration


 


Sodium Chloride  10 ml  01/11/23 01:00  01/15/23 01:23





  Sodium Chloride Flush 0.9% 10 Ml Syringe  IVP   10 ml





  0100,0900,1700 CALDERON   Administration














- Lab Result


Fish Bone Diagrams: 


                                 01/14/23 04:53





                                 01/14/23 04:53





- Additional Planning


My Orders: 





My Active Orders





01/14/23 09:00


Metoprolol Succinate [Toprol Xl]   12.5 mg PO DAILY 


cefTRIAXone [Rocephin] 1 gm   Sodium Chloride 0.9% Minibag [Normal Saline 0.9% 

Minibag] 100 ml IV DAILY 














Subjective





- Subjective


Patient Reports: Resting Comfortably, No Complaints





Objective


Vital Signs: 





                               Vital Signs - 24 hr











  01/14/23 01/15/23





  16:00 00:00


 


Temperature 36.6 C 37 C


 


Heart Rate [ 82 81





Brachial]  


 


Respiratory 22 20





Rate  


 


Blood Pressure 101/75 103/59 L





[Right Brachial  





artery]  


 


O2 Saturation 98 96








                                     Oxygen











O2 Source                      Room air














I&O (Last 24 Hrs): 





                          Intake and Output Totals x24h











 01/13/23 01/14/23 01/15/23





 23:59 23:59 23:59


 


Intake Total 3436.667 980 100


 


Output Total   50


 


Balance 3436.667 980 50











General: Alert, No acute distress, Other (Cachectic elederly female sitting in a

chair)


HEENT: EOMI, Mucous membr. moist/pink


Neck: Supple, No JVD


Neuro: Alert, Disoriented, Non Focal


Cardiovascular: No murmurs


Respiratory: No respiratory distress, Breath sounds nml


Abdomen: Soft


Extremities: No clubbing, No edema, No tenderness/swelling





- Results


Results: 





                               Laboratory Results











WBC  6.0 x10^3/uL (4.8-10.8)   01/14/23  04:53    


 


RBC  3.46 10^6/uL (4.20-5.40)  L  01/14/23  04:53    


 


Hgb  8.9 g/dL (12.0-16.0)  L  01/14/23  04:53    


 


Hct  28.6 % (37.0-47.0)  L  01/14/23  04:53    


 


MCV  82.7 fL (81.0-99.0)   01/14/23  04:53    


 


MCH  25.7 pg (27.0-31.0)  L  01/14/23  04:53    


 


MCHC  31.1 g/dL (32.0-36.0)  L  01/14/23  04:53    


 


RDW  15.1 % (12.0-15.0)  H  01/14/23  04:53    


 


Plt Count  126 10^3/uL (130-450)  L  01/14/23  04:53    


 


MPV  11.0 fL (7.9-10.8)  H  01/14/23  04:53    


 


Neut # (Auto)  4.4 10^3/uL (1.5-6.6)   01/14/23  04:53    


 


Lymph # (Auto)  1.0 10^3/uL (1.5-3.5)  L  01/14/23  04:53    


 


Mono # (Auto)  0.4 10^3/uL (0.0-1.0)   01/14/23  04:53    


 


Eos # (Auto)  0.2 10^3/uL (0.0-0.7)   01/14/23  04:53    


 


Baso # (Auto)  0.0 10^3/uL (0.0-0.1)   01/14/23  04:53    


 


Absolute Nucleated RBC  0.00 x10^3/uL  01/14/23  04:53    


 


Total Counted  100   01/10/23  17:56    


 


Band Neuts % (Manual)  10 % (0-10)  01/10/23  17:56    


 


Abnorm Lymph % (Manual)  0 %  01/10/23  17:56    


 


Nucleated RBC %  0.0 /100WBC  01/14/23  04:53    


 


Neutrophils # (Manual)  18.7 10^3/uL (1.5-6.6)  H  01/10/23  17:56    


 


Lymphocytes # (Manual)  1.0 10^3/uL (1.5-3.5)  L  01/10/23  17:56    


 


Monocytes # (Manual)  0.8 10^3/uL (0.0-1.0)   01/10/23  17:56    


 


Eosinophils # (Manual)  0.0 10^3/uL (0-0.7)   01/10/23  17:56    


 


Basophils # (Manual)  0.0 10^3/uL (0-0.1)   01/10/23  17:56    


 


Differential Comment  MANUAL DIFFERENTIAL   01/10/23  17:56    


 


WBC Morphology  1+ TOXIC GRANULATION  (NORMAL)   01/10/23  17:56    


 


Platelet Estimate  NORMAL (130-450,000)  (NORMAL)   01/10/23  17:56    


 


Platelet Morphology  NORMAL APPEARANCE  (NORMAL)   01/10/23  17:56    


 


RBC Morph Micro Appear  NORMAL APPEARANCE  (NORMAL)   01/10/23  17:56    


 


PT  14.5 secs (9.9-12.6)  H  01/10/23  17:56    


 


INR  1.3  (0.8-1.2)  H  01/10/23  17:56    


 


APTT  28.4 secs (24.9-33.3)   01/10/23  17:56    


 


Sodium  143 mmol/L (135-145)   01/14/23  04:53    


 


Potassium  3.3 mmol/L (3.5-5.0)  L  01/14/23  04:53    


 


Chloride  109 mmol/L (101-111)   01/14/23  04:53    


 


Carbon Dioxide  27 mmol/L (21-32)   01/14/23  04:53    


 


Anion Gap  7.0  (6-13)   01/14/23  04:53    


 


BUN  14 mg/dL (6-20)   01/14/23  04:53    


 


Creatinine  0.4 mg/dL (0.4-1.0)   01/14/23  04:53    


 


Estimated GFR (MDRD)  150  (>89)   01/14/23  04:53    


 


Glucose  92 mg/dL ()   01/14/23  04:53    


 


Lactic Acid  1.4 mmol/L (0.5-2.2)   01/11/23  01:12    


 


Calcium  8.2 mg/dL (8.5-10.3)  L  01/14/23  04:53    


 


Magnesium  1.9 mg/dL (1.7-2.8)   01/12/23  04:46    


 


Total Bilirubin  1.1 mg/dL (0.2-1.0)  H  01/10/23  17:56    


 


AST  14 IU/L (10-42)   01/10/23  17:56    


 


ALT  < 10 IU/L (10-60)  L  01/10/23  17:56    


 


Alkaline Phosphatase  56 IU/L ()   01/10/23  17:56    


 


Troponin I High Sens  9.6 ng/L (2.3-14.8)   01/11/23  15:56    


 


Total Protein  5.5 g/dL (6.7-8.2)  L  01/10/23  17:56    


 


Albumin  2.7 g/dL (3.2-5.5)  L  01/10/23  17:56    


 


Globulin  2.8 g/dL (2.1-4.2)   01/10/23  17:56    


 


Albumin/Globulin Ratio  1.0  (1.0-2.2)   01/10/23  17:56    


 


Lipase  21 U/L (22-51)  L  01/10/23  17:56    


 


Urine Color  YELLOW   01/10/23  17:41    


 


Urine Clarity  TURBID  (CLEAR)   01/10/23  17:41    


 


Urine pH  7.5 PH (5.0-7.5)   01/10/23  17:41    


 


Ur Specific Gravity  1.020  (1.002-1.030)   01/10/23  17:41    


 


Urine Protein  >=300 mg/dL (NEGATIVE)  H  01/10/23  17:41    


 


Urine Glucose (UA)  NEGATIVE mg/dL (NEGATIVE)   01/10/23  17:41    


 


Urine Ketones  TRACE mg/dL (NEGATIVE)   01/10/23  17:41    


 


Urine Occult Blood  LARGE  (NEGATIVE)  H  01/10/23  17:41    


 


Urine Nitrite  NEGATIVE  (NEGATIVE)   01/10/23  17:41    


 


Urine Bilirubin  NEGATIVE  (NEGATIVE)   01/10/23  17:41    


 


Urine Urobilinogen  1 (NORMAL) E.U./dL (NORMAL)   01/10/23  17:41    


 


Ur Leukocyte Esterase  SMALL  (NEGATIVE)  H  01/10/23  17:41    


 


Urine RBC  11-25 /HPF (0-5)  H  01/10/23  17:41    


 


Urine WBC  >25 /HPF (0-5)  H  01/10/23  17:41    


 


Ur Squamous Epith Cells  RARE Squamous  (<= Few)   01/10/23  17:41    


 


Urine Bacteria  Many /HPF (None Seen)  H  01/10/23  17:41    


 


Ur Microscopic Review  INDICATED   01/10/23  17:41    


 


Urine Culture Comments  INDICATED   01/10/23  17:41    


 


Nasal Adenovirus (PCR)  NOT DETECTED   01/10/23  17:46    


 


Nasal B. parapertussis DNA (PCR)  NOT DETECTED   01/10/23  17:46    


 


Nasal Coronavir 229E PCR  NOT DETECTED   01/10/23  17:46    


 


Nasal Coronavir HKU1 PCR  NOT DETECTED   01/10/23  17:46    


 


Nasal Coronavir NL63 PCR  NOT DETECTED   01/10/23  17:46    


 


Nasal Coronavir OC43 PCR  NOT DETECTED   01/10/23  17:46    


 


Nasal Enterovir/Rhinovir PCR  NOT DETECTED   01/10/23  17:46    


 


Nasal Influenza B PCR  NOT DETECTED   01/10/23  17:46    


 


Nasal Influenza A PCR  NOT DETECTED   01/10/23  17:46    


 


Nasal Parainfluen 1 PCR  NOT DETECTED   01/10/23  17:46    


 


Nasal Parainfluen 2 PCR  NOT DETECTED   01/10/23  17:46    


 


Nasal Parainfluen 3 PCR  NOT DETECTED   01/10/23  17:46    


 


Nasal Parainfluen 4 PCR  NOT DETECTED   01/10/23  17:46    


 


Nasal RSV (PCR)  DETECTED  A  01/10/23  17:46    


 


Nasal B.pertussis DNA PCR  NOT DETECTED   01/10/23  17:46    


 


Nasal C.pneumoniae (PCR)  NOT DETECTED   01/10/23  17:46    


 


Cande Human Metapneumo PCR  NOT DETECTED   01/10/23  17:46    


 


Nasal M.pneumoniae (PCR)  NOT DETECTED   01/10/23  17:46    


 


Nasal SARS-CoV-2 (PCR)  NOT DETECTED   01/10/23  17:46    


 


SARS-CoV-2 (PCR)  NOT DETECTED   01/13/23  11:50

## 2023-01-16 RX ADMIN — ASPIRIN SCH MG: 81 TABLET, COATED ORAL at 08:44

## 2023-01-16 RX ADMIN — DILTIAZEM HYDROCHLORIDE SCH: 120 CAPSULE, COATED, EXTENDED RELEASE ORAL at 08:46

## 2023-01-16 RX ADMIN — CARBIDOPA AND LEVODOPA SCH TAB: 25; 100 TABLET ORAL at 10:11

## 2023-01-16 RX ADMIN — CARBIDOPA AND LEVODOPA SCH TAB: 25; 100 TABLET, EXTENDED RELEASE ORAL at 08:51

## 2023-01-16 RX ADMIN — CARBIDOPA AND LEVODOPA SCH TAB: 25; 100 TABLET ORAL at 06:12

## 2023-01-16 RX ADMIN — SENNOSIDES SCH: 8.6 TABLET, FILM COATED ORAL at 08:47

## 2023-01-16 RX ADMIN — Medication SCH MCG: at 08:44

## 2023-01-16 RX ADMIN — MIRTAZAPINE SCH MG: 15 TABLET, FILM COATED ORAL at 21:05

## 2023-01-16 RX ADMIN — DILTIAZEM HYDROCHLORIDE SCH: 120 CAPSULE, COATED, EXTENDED RELEASE ORAL at 21:05

## 2023-01-16 RX ADMIN — CARBIDOPA AND LEVODOPA SCH TAB: 25; 100 TABLET ORAL at 21:06

## 2023-01-16 RX ADMIN — SODIUM CHLORIDE, PRESERVATIVE FREE SCH ML: 5 INJECTION INTRAVENOUS at 00:31

## 2023-01-16 RX ADMIN — SODIUM CHLORIDE, PRESERVATIVE FREE SCH ML: 5 INJECTION INTRAVENOUS at 08:47

## 2023-01-16 RX ADMIN — METOPROLOL SUCCINATE SCH: 25 TABLET, EXTENDED RELEASE ORAL at 21:04

## 2023-01-16 RX ADMIN — SODIUM CHLORIDE, PRESERVATIVE FREE SCH ML: 5 INJECTION INTRAVENOUS at 18:14

## 2023-01-16 RX ADMIN — THERA TABS SCH TAB: TAB at 08:43

## 2023-01-16 RX ADMIN — CARBIDOPA AND LEVODOPA SCH: 25; 100 TABLET, EXTENDED RELEASE ORAL at 21:04

## 2023-01-16 RX ADMIN — CARBIDOPA AND LEVODOPA SCH TAB: 25; 100 TABLET ORAL at 18:14

## 2023-01-16 RX ADMIN — PRAMIPEXOLE DIHYDROCHLORIDE SCH MG: 0.25 TABLET ORAL at 21:05

## 2023-01-16 RX ADMIN — CARBIDOPA AND LEVODOPA SCH TAB: 25; 100 TABLET ORAL at 13:53

## 2023-01-16 RX ADMIN — DEXTROSE MONOHYDRATE SCH MLS/HR: 50 INJECTION, SOLUTION INTRAVENOUS at 08:45

## 2023-01-16 RX ADMIN — DILTIAZEM HYDROCHLORIDE SCH: 120 CAPSULE, COATED, EXTENDED RELEASE ORAL at 08:47

## 2023-01-16 RX ADMIN — METOPROLOL SUCCINATE SCH MG: 25 TABLET, EXTENDED RELEASE ORAL at 08:44

## 2023-01-16 RX ADMIN — ACETAMINOPHEN PRN MG: 325 TABLET ORAL at 06:12

## 2023-01-16 NOTE — PROVIDER PROGRESS NOTE
Assessment/Plan





- Problem List


(1) E. coli UTI


Assessment/Plan: 


Abnormal UA at admission  This appeared to be the source of her sepsis and AMS 

at presentation.


Blood cultures are negative to date


Her urine culture has grown E. coli and sensitivities are available: It is 

resistant to the quinolones, sens to Keflex, Ceftriaxone and Bactrim


Plan:


She will be changed from Ceftriaxone iv to Ceftin po and will finish a course of

antibx for the UTI after tomorrow's meds





(2) CAP


This patient is a resident of a nursing home. When she complained of a new cough

and pleuritic chest pain after admission, on 1/11, a chest x-ray was repeated 

and this showed a new pneumonia on the left side. It was not seen on the 

admission CXR, probably because she was dehydrated.


She never brought up any sputum to send for culture.  Blood cultures have been 

negative to date


She was already on ceftriaxone for UTI.


Zithromax  was added and she completed a course of Zithro .


Plan:


She has been medically clear for DCh





(3) RSV infection


Her chest x-ray did not show any infiltrate at admission, however the daughter 

reported on day #2 that the patient complained of a cough that she cannot 

expectorate. The repeat chest x-ray did show a new left-sided infiltrate. 

Mucinex was started.


Plan:


Cont Mucinex if needed.





(4) Hypotension


BP was running 90 syst, today is 100 syst


Her "soft" BP persisted and was consistent with infection and volume depletion. 


We did obtain an Echocardiogram which showed: Normal LV and RV chamber sizes, 

normal LV and RV systolic function.  She has mild mitral and tricuspid 

regurgitation, PA pressure 35 to 40 mmHg and a patent foramen ovale present with

left-to-right shunt


Plan:


We tapered her IV fluids to off, since she is eating and drinking well


Will continue to hold any meds other than Metoprolol, that drop blood pressure





(5) Afib


An EKG was done on the day she complained of chest pain.  The baseline had 

motion artifact (because of her Parkinson's tremor) but probably shows A. fib.  

It is unknown if she has had A. fib documented before. She has never had an EKG 

here, to have for comparison.


Her heart rate is occasionally documented at being 110, mostly it is under 100.


He reconciled medication list shows that she was already on metoprolol 25 mg qpm

and 1 baby aspirin daily


Plan:


Cont 1 baby aspirin daily for her stroke prophylaxis.


We have increased her metoprolol slightly, for better rate control





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


The daughter reported that the patient normally has hallucinations, even when 

she is at her best and on her Parkinson treatment


Plan:


Continue her usual Parkinsons meds and any psych medications





(7) PFO


The Echo shows a left to right shunt, it is not going right to left which would 

give her a higher risk for an systemic emboli. This is a new finding for this pt


Plan:


She is not a candidate for PFO closure, given her age, comorbidities and no 

aggressive measures/Comfort Care desired.





(8) Bedbound


Report was received that she is bedbound, transfers to a wheelchair using a Jaycee

lift.


Plan:


Continue to get her OOB daily using a lift


No PT or OT rehab have been ordered


She can be discharged to her nursing home residence facility.  Social work has 

contacted them but they need to get a new approval from her Insurances for her 

to return





(9) Sacral decubitus, stage 2


As per photos


Plan:


Topical dressing changes daily





(10) Hx of HTN


As per history.


Plan:


We are still hold any meds that drop her blood pressure





(11) AMS


Resolved. She is not as somnolent, is awake, she only answers minimally to 

strangers but more easily to family, according to the daughter.


Plan:


We ar continuing to treat the underlying cause for her confusion and somnolence,

her infections.





(12) Pleuritic chest pain


Assessment/Plan: 


Resolved once the pneumonia was treated





(13) Severe sepsis


Resolved, along with improved mental status and WBC


Plan:


Continue with IV antibiotics











- Current Meds


Current Meds: 





                               Current Medications











Generic Name Dose Route Start Last Admin





  Trade Name Freq  PRN Reason Stop Dose Admin


 


Acetaminophen  650 mg  01/10/23 20:03  01/16/23 06:12





  Acetaminophen 325 Mg Tablet  PO   650 mg





  Q4HR PRN   Administration





  Pain 1 to 4, or Fever  


 


Aspirin  81 mg  01/12/23 09:00  01/16/23 08:44





  Aspirin Ec 81 Mg Tablet  PO   81 mg





  DAILY CALDERON   Administration


 


Carbidopa/Levodopa  1 tab  01/11/23 14:00  01/16/23 08:51





  Carbidopa/Levodopa Er 25 Mg/100 Mg Tablet  PO   1 tab





  BID CALDERON   Administration


 


Carbidopa/Levodopa  1 tab  01/11/23 14:00  01/16/23 13:53





  Carbidopa/Levodopa 25 Mg/100 Mg Tablet  PO   1 tab





  5XD CALDERON   Administration


 


Cholecalciferol  50 mcg  01/12/23 09:00  01/16/23 08:44





  Cholecalciferol 25 Mcg Tablet  PO   50 mcg





  DAILY CALDERON   Administration


 


Guaifenesin  600 mg  01/11/23 21:00  01/16/23 08:44





  Guaifenesin 600 Mg Tablet  PO   600 mg





  BID CALDERON   Administration


 


Ceftriaxone Sodium 1 gm/  100 mls @ 200 mls/hr  01/14/23 09:00  01/16/23 09:15





  Sodium Chloride  IV   Infused





  DAILY CALDERON   Infusion


 


Lidocaine  1 patch  01/11/23 13:23  01/12/23 10:00





  Lidocaine Patch 5%  TOP   1 patch





  DAILY PRN   Administration





  PAIN  


 


Metoprolol Succinate  12.5 mg  01/14/23 09:00  01/16/23 08:44





  Metoprolol Succinate 25 Mg Tablet  PO   12.5 mg





  DAILY CALDERON   Administration


 


Metoprolol Succinate  25 mg  01/13/23 21:00  01/15/23 22:04





  Metoprolol Succinate 25 Mg Tablet  PO   25 mg





  QPM CALDERON   Administration


 


Mirtazapine  7.5 mg  01/13/23 21:00  01/15/23 22:03





  Mirtazapine 15 Mg Tablet  PO   7.5 mg





  QPM CALDERON   Administration


 


Multivitamins  1 tab  01/12/23 08:00  01/16/23 08:43





  Multivitamin Tablet  PO   1 tab





  DAILYWM CALDERON   Administration


 


**Ipratropium  2 each  01/11/23 21:00  01/16/23 08:46





Bromide [Ipratropium  CANDE   Not Given





Bromide] 15 Ml  BID CALDERON  





Spray**   


 


Patient Own Med (D-  1 each  01/12/23 09:00  01/16/23 08:47





Mannose [Azo D-  PO   Not Given





Mannose] 500 Mg  DAILY [facility]  





Capsule)   


 


Polyethylene Glycol  17 gm  01/12/23 09:00  01/16/23 08:47





  Polyethylene Glycol 3350 17 Gm Packet  PO   Not Given





  DAILY CALDERON  


 


Pramipexole Dihydrochloride  0.25 mg  01/13/23 21:00  01/15/23 22:04





  Pramipexole 0.25 Mg Tablet  PO   0.25 mg





  QPM CALDERON   Administration


 


Senna  17.2 mg  01/12/23 09:00  01/16/23 08:47





  Senna 8.6 Mg Tablet  PO   Not Given





  DAILY [facility]  


 


Sodium Chloride  10 ml  01/11/23 01:00  01/16/23 08:47





  Sodium Chloride Flush 0.9% 10 Ml Syringe  IVP   10 ml





  0100,0900,1700 [facility]   Administration














- Lab Result


[place] Diagrams: 


                                 01/14/23 04:53





                                 01/14/23 04:53





- Additional Planning


My Orders: 





My Active Orders





01/16/23 21:00


Estrogens, Conjugated Cream [Premarin Cream]   0.498 applic VG MOFR 














Subjective





- Subjective


Patient Reports: Resting Comfortably, No Complaints





Objective


Vital Signs: 





                               Vital Signs - 24 hr











  01/15/23 01/15/23 01/16/23





  17:54 22:16 00:21


 


Temperature 36.4 C L  36.5 C


 


Heart Rate [ 118 H 79 70





Brachial]   


 


Respiratory 24  20





Rate   


 


Blood Pressure 121/80 98/62 94/61





[Right Brachial   





artery]   


 


O2 Saturation 98  96














  01/16/23





  08:20


 


Temperature 36.4 C L


 


Heart Rate [ 68





Brachial] 


 


Respiratory 20





Rate 


 


Blood Pressure 112/70





[Right Brachial 





artery] 


 


O2 Saturation 99








                                     Oxygen











O2 Source                      Room air














I&O (Last 24 Hrs): 





                          Intake and Output Totals x24h











 01/14/23 01/15/23 01/16/23





 23:59 23:59 23:59


 


Intake Total 980 1090 460


 


Output Total  200 350


 


Balance 980 890 110











General: Alert


HEENT: EOMI


Neck: Supple


Neuro: Alert, Disoriented


Cardiovascular: No murmurs


Respiratory: No respiratory distress, Breath sounds nml


Abdomen: Soft


Extremities: No edema





- Results


Results: 





                               Laboratory Results











WBC  6.0 x10^3/uL (4.8-10.8)   01/14/23  04:53    


 


RBC  3.46 10^6/uL (4.20-5.40)  L  01/14/23  04:53    


 


Hgb  8.9 g/dL (12.0-16.0)  L  01/14/23  04:53    


 


Hct  28.6 % (37.0-47.0)  L  01/14/23  04:53    


 


MCV  82.7 fL (81.0-99.0)   01/14/23  04:53    


 


MCH  25.7 pg (27.0-31.0)  L  01/14/23  04:53    


 


MCHC  31.1 g/dL (32.0-36.0)  L  01/14/23  04:53    


 


RDW  15.1 % (12.0-15.0)  H  01/14/23  04:53    


 


Plt Count  126 10^3/uL (130-450)  L  01/14/23  04:53    


 


MPV  11.0 fL (7.9-10.8)  H  01/14/23  04:53    


 


Neut # (Auto)  4.4 10^3/uL (1.5-6.6)   01/14/23  04:53    


 


Lymph # (Auto)  1.0 10^3/uL (1.5-3.5)  L  01/14/23  04:53    


 


Mono # (Auto)  0.4 10^3/uL (0.0-1.0)   01/14/23  04:53    


 


Eos # (Auto)  0.2 10^3/uL (0.0-0.7)   01/14/23  04:53    


 


Baso # (Auto)  0.0 10^3/uL (0.0-0.1)   01/14/23  04:53    


 


Absolute Nucleated RBC  0.00 x10^3/uL  01/14/23  04:53    


 


Total Counted  100   01/10/23  17:56    


 


Band Neuts % (Manual)  10 % (0-10)  01/10/23  17:56    


 


Abnorm Lymph % (Manual)  0 %  01/10/23  17:56    


 


Nucleated RBC %  0.0 /100WBC  01/14/23  04:53    


 


Neutrophils # (Manual)  18.7 10^3/uL (1.5-6.6)  H  01/10/23  17:56    


 


Lymphocytes # (Manual)  1.0 10^3/uL (1.5-3.5)  L  01/10/23  17:56    


 


Monocytes # (Manual)  0.8 10^3/uL (0.0-1.0)   01/10/23  17:56    


 


Eosinophils # (Manual)  0.0 10^3/uL (0-0.7)   01/10/23  17:56    


 


Basophils # (Manual)  0.0 10^3/uL (0-0.1)   01/10/23  17:56    


 


Differential Comment  MANUAL DIFFERENTIAL   01/10/23  17:56    


 


WBC Morphology  1+ TOXIC GRANULATION  (NORMAL)   01/10/23  17:56    


 


Platelet Estimate  NORMAL (130-450,000)  (NORMAL)   01/10/23  17:56    


 


Platelet Morphology  NORMAL APPEARANCE  (NORMAL)   01/10/23  17:56    


 


RBC Morph Micro Appear  NORMAL APPEARANCE  (NORMAL)   01/10/23  17:56    


 


PT  14.5 secs (9.9-12.6)  H  01/10/23  17:56    


 


INR  1.3  (0.8-1.2)  H  01/10/23  17:56    


 


APTT  28.4 secs (24.9-33.3)   01/10/23  17:56    


 


Sodium  143 mmol/L (135-145)   01/14/23  04:53    


 


Potassium  3.3 mmol/L (3.5-5.0)  L  01/14/23  04:53    


 


Chloride  109 mmol/L (101-111)   01/14/23  04:53    


 


Carbon Dioxide  27 mmol/L (21-32)   01/14/23  04:53    


 


Anion Gap  7.0  (6-13)   01/14/23  04:53    


 


BUN  14 mg/dL (6-20)   01/14/23  04:53    


 


Creatinine  0.4 mg/dL (0.4-1.0)   01/14/23  04:53    


 


Estimated GFR (MDRD)  150  (>89)   01/14/23  04:53    


 


Glucose  92 mg/dL ()   01/14/23  04:53    


 


Lactic Acid  1.4 mmol/L (0.5-2.2)   01/11/23  01:12    


 


Calcium  8.2 mg/dL (8.5-10.3)  L  01/14/23  04:53    


 


Magnesium  1.9 mg/dL (1.7-2.8)   01/12/23  04:46    


 


Total Bilirubin  1.1 mg/dL (0.2-1.0)  H  01/10/23  17:56    


 


AST  14 IU/L (10-42)   01/10/23  17:56    


 


ALT  < 10 IU/L (10-60)  L  01/10/23  17:56    


 


Alkaline Phosphatase  56 IU/L ()   01/10/23  17:56    


 


Troponin I High Sens  9.6 ng/L (2.3-14.8)   01/11/23  15:56    


 


Total Protein  5.5 g/dL (6.7-8.2)  L  01/10/23  17:56    


 


Albumin  2.7 g/dL (3.2-5.5)  L  01/10/23  17:56    


 


Globulin  2.8 g/dL (2.1-4.2)   01/10/23  17:56    


 


Albumin/Globulin Ratio  1.0  (1.0-2.2)   01/10/23  17:56    


 


Lipase  21 U/L (22-51)  L  01/10/23  17:56    


 


Urine Color  YELLOW   01/10/23  17:41    


 


Urine Clarity  TURBID  (CLEAR)   01/10/23  17:41    


 


Urine pH  7.5 PH (5.0-7.5)   01/10/23  17:41    


 


Ur Specific Gravity  1.020  (1.002-1.030)   01/10/23  17:41    


 


Urine Protein  >=300 mg/dL (NEGATIVE)  H  01/10/23  17:41    


 


Urine Glucose (UA)  NEGATIVE mg/dL (NEGATIVE)   01/10/23  17:41    


 


Urine Ketones  TRACE mg/dL (NEGATIVE)   01/10/23  17:41    


 


Urine Occult Blood  LARGE  (NEGATIVE)  H  01/10/23  17:41    


 


Urine Nitrite  NEGATIVE  (NEGATIVE)   01/10/23  17:41    


 


Urine Bilirubin  NEGATIVE  (NEGATIVE)   01/10/23  17:41    


 


Urine Urobilinogen  1 (NORMAL) E.U./dL (NORMAL)   01/10/23  17:41    


 


Ur Leukocyte Esterase  SMALL  (NEGATIVE)  H  01/10/23  17:41    


 


Urine RBC  11-25 /HPF (0-5)  H  01/10/23  17:41    


 


Urine WBC  >25 /HPF (0-5)  H  01/10/23  17:41    


 


Ur Squamous Epith Cells  RARE Squamous  (<= Few)   01/10/23  17:41    


 


Urine Bacteria  Many /HPF (None Seen)  H  01/10/23  17:41    


 


Ur Microscopic Review  INDICATED   01/10/23  17:41    


 


Urine Culture Comments  INDICATED   01/10/23  17:41    


 


Nasal Adenovirus (PCR)  NOT DETECTED   01/10/23  17:46    


 


Nasal B. parapertussis DNA (PCR)  NOT DETECTED   01/10/23  17:46    


 


Nasal Coronavir 229E PCR  NOT DETECTED   01/10/23  17:46    


 


Nasal Coronavir HKU1 PCR  NOT DETECTED   01/10/23  17:46    


 


Nasal Coronavir NL63 PCR  NOT DETECTED   01/10/23  17:46    


 


Nasal Coronavir OC43 PCR  NOT DETECTED   01/10/23  17:46    


 


Nasal Enterovir/Rhinovir PCR  NOT DETECTED   01/10/23  17:46    


 


Nasal Influenza B PCR  NOT DETECTED   01/10/23  17:46    


 


Nasal Influenza A PCR  NOT DETECTED   01/10/23  17:46    


 


Nasal Parainfluen 1 PCR  NOT DETECTED   01/10/23  17:46    


 


Nasal Parainfluen 2 PCR  NOT DETECTED   01/10/23  17:46    


 


Nasal Parainfluen 3 PCR  NOT DETECTED   01/10/23  17:46    


 


Nasal Parainfluen 4 PCR  NOT DETECTED   01/10/23  17:46    


 


Nasal RSV (PCR)  DETECTED  A  01/10/23  17:46    


 


Nasal B.pertussis DNA PCR  NOT DETECTED   01/10/23  17:46    


 


Nasal C.pneumoniae (PCR)  NOT DETECTED   01/10/23  17:46    


 


Cande Human Metapneumo PCR  NOT DETECTED   01/10/23  17:46    


 


Nasal M.pneumoniae (PCR)  NOT DETECTED   01/10/23  17:46    


 


Nasal SARS-CoV-2 (PCR)  NOT DETECTED   01/10/23  17:46    


 


SARS-CoV-2 (PCR)  NOT DETECTED   01/13/23  11:50

## 2023-01-17 VITALS — SYSTOLIC BLOOD PRESSURE: 91 MMHG | DIASTOLIC BLOOD PRESSURE: 57 MMHG

## 2023-01-17 RX ADMIN — CARBIDOPA AND LEVODOPA SCH TAB: 25; 100 TABLET, EXTENDED RELEASE ORAL at 08:27

## 2023-01-17 RX ADMIN — DILTIAZEM HYDROCHLORIDE SCH: 120 CAPSULE, COATED, EXTENDED RELEASE ORAL at 08:27

## 2023-01-17 RX ADMIN — SODIUM CHLORIDE, PRESERVATIVE FREE SCH ML: 5 INJECTION INTRAVENOUS at 08:28

## 2023-01-17 RX ADMIN — Medication SCH MCG: at 08:24

## 2023-01-17 RX ADMIN — CARBIDOPA AND LEVODOPA SCH TAB: 25; 100 TABLET ORAL at 05:53

## 2023-01-17 RX ADMIN — METOPROLOL SUCCINATE SCH MG: 25 TABLET, EXTENDED RELEASE ORAL at 08:23

## 2023-01-17 RX ADMIN — ASPIRIN SCH MG: 81 TABLET, COATED ORAL at 08:23

## 2023-01-17 RX ADMIN — CARBIDOPA AND LEVODOPA SCH TAB: 25; 100 TABLET ORAL at 10:32

## 2023-01-17 RX ADMIN — CARBIDOPA AND LEVODOPA SCH TAB: 25; 100 TABLET ORAL at 13:30

## 2023-01-17 RX ADMIN — SENNOSIDES SCH: 8.6 TABLET, FILM COATED ORAL at 08:28

## 2023-01-17 RX ADMIN — SODIUM CHLORIDE, PRESERVATIVE FREE SCH ML: 5 INJECTION INTRAVENOUS at 01:53

## 2023-01-17 RX ADMIN — THERA TABS SCH TAB: TAB at 08:24

## 2023-01-17 NOTE — DISCHARGE PLAN
Discharge Plan for SNF / [facility]





- Discharge Plan And Transition Orders


Problem Reviewed?: Yes


Disposition: 01 Home, Self Care


Condition: Stable


Allergies and Adverse Reactions: 


                                    Allergies











Allergy/AdvReac Type Severity Reaction Status Date / Time


 


alendronate sodium Allergy  Unknown Verified 11/09/21 19:51





[From Fosamax]     


 


amoxicillin Allergy  Unknown Verified 11/09/21 19:51


 


oxycodone Allergy  Unknown Verified 11/09/21 19:51


 


risedronate sodium Allergy  Unknown Verified 11/09/21 19:51


 


Sulfa (Sulfonamide Allergy  Rash Verified 11/09/21 19:51





Antibiotics)     


 


tramadol Allergy  Unknown Verified 11/09/21 19:51











Health Concerns: 


83-year-old female who has a past medical history of hypertension, 

hyperlipidemia, dementia and lives at a skilled nursing facility who was brought

in by ambulance from the skilled nursing facility due to weakness and altered 

mental status.  She is wheelchair-bound at baseline.  She was unable to provide 

a history.  Her blood pressure was in the 60s systolic with a white cell count 

of 20, lactic acid 2.2.  Source was urine.  She responded to antibiotic therapy.

 Urine culture grew out E. coli.  She was switched to p.o. cefuroxime on January 16.


Plan of Treatment: 


When she was initially admitted the presumptive cause of her infection was 

thought to be pneumonia as well as UTI.   As such she received azithromycin and 

ceftriaxone.  Ceftriaxone was changed to cefuroxime.  Blood pressure normalized.

 White cell count started at 20.5 and was 6.0 by discharge.





She has received a total of 7 days of antibiotics.  Those will not be continued 

at her residential facility


Care Goals: 


To return to her residential facility.  No change in medications.


Assessment: 


Cooperative, elderly female with moderate dementia.  Unable to participate in 

care planning.





- SNF / [facility] Transition Orders


Admit to (Facility): [facility]


Under the care of (Name): [facility]


Discharge Diagnosis: 


1.  Sepsis with shock


2.  E. coli UTI


3.  Healthcare associated pneumonia


4.  RSV infection


5.  Chronic atrial fibrillation


6.  Dementia due to Parkinson's disease with behavioral disturbance 

(hallucinations)


7.  Patent foramen ovale


8.  Bedbound status


9.  Sacral decubitus, stage II, present on admission


10.  Hypertension


11.  Metabolic encephalopathy





Weight on admission and: Weekly


Other Notification Orders: 


Call PCP immediately if patient develops dyspnea, chest pain/tightness or edema.





House Bowel Program: Yes


Additional Bowel Program Orders: 


If no BM after 2 days, nurse may give M.O.M. 30ml PO PRN and/or ducolax Supp 1 

TN and/or LORNA 250mg P.O., and/or senna 1-2 tabs PO.  On day 3 nurse may give 

repeat above order until residents constipation is resolved. 





Annual Influenza Vaccine (between Sept 1st and March 31st): Yes


Two-step PPD per [facility] [id number] or approved exception documents: Yes


Medication Orders: 





PLEASE REFER TO THE DISCHARGE MEDICATION LIST.








Insulin Orders?: No





- Diet


Type: Geriatric


Texture: Mech soft


Liquids: Thin


May have monthly special meal: Yes





- Therapies | Activity


Rehabilitation Potential: Return to independent living


Activity: Activity as Tolerated

## 2023-01-17 NOTE — DISCHARGE SUMMARY
Discharge Summary


Admit Date: 01/10/23


Discharge Date: 01/17/23


Discharging Provider: Mishel Nevarez MD


Primary Care Provider: [facility]


Code Status: Do Not Attempt Resuscitation


Condition at Discharge: Stable


Discharge Disposition: 01 Home, Self Care





- DIAGNOSES


Discharge Diagnoses with Status of Each Condition: 





1.  Sepsis with shock


2.  E. coli UTI


3.  Healthcare associated pneumonia


4.  RSV infection


5.  Chronic atrial fibrillation


6.  Dementia due to Parkinson's disease with behavioral disturbance 

(hallucinations)


7.  Patent foramen ovale


8.  Bedbound status


9.  Sacral decubitus, stage II, present on admission


10.  Hypertension


11.  Metabolic encephalopathy








- HPI


History of Present Illness: 





83 y old female with PMH HTN, HLP BIBA fron nursing home due to weakness and 

AMS. Pt is wheelchair bound at baseline. Pt is unable to porovide history at 

this time so most of history is from ER physician and ER record.Per EMS, her IVANNA 

was in 60`s systolic. She was given IVF. Pt is alert and awake at the time of my

examination.Able to move all extremities. Pt also has cough





On Presentaion, pt was quite hypotensive. She was given  cc IV bolus X 1. 

She is currently getting NS @ 150 cc/h. Her systolic BP is 91. 





Labs showed WBC 20, lactic acid 2.2. 





UA showed UTI





RSV positive





CXR showed no acute abnormalities





Pt is DNR





Pt is being admitted due to Severe sepsis, UTI, AMS, weakness, RSV infection.





History





- Past Medical History


Cardiovascular: reports: Hypertension, High cholesterol


Respiratory: reports: None


Endocrine/Autoimmune: reports: None


Psych: reports: None


Musculoskeletal: reports: Osteoarthritis, Osteoporosis, Fatigue


MRSA Hx?: No





- Past Surgical History


/GYN: reports: Hysterectomy





- CONSULTS | PROCEDURES


Procedures: 


Chest x-ray with left lung pneumonia.  Follow-up PA lateral chest x-ray 

recommended or CT chest recommended to ensure resolution and to exclude 

underlying neoplasm.





Urine culture with E. coli





Blood culture negative after 5 days








- HOSPITAL COURSE


Hospital Course: 





When she was initially admitted the presumptive cause of her infection was 

thought to be pneumonia as well as UTI.   As such she received azithromycin and 

ceftriaxone.  Ceftriaxone was changed to cefuroxime.  Blood pressure normalized.

 White cell count started at 20.5 and was 6.0 by discharge. Blood cultures were 

negative and urine culture grew out E. coli.  She had some visual hallucinations

on top of her baseline dementia.She has received a total of 7 days of 

antibiotics.  Those will not be continued at her residential facility


Care Goals: 


To return to her residential facility.  No change in medications.


Assessment: 


Cooperative, elderly female with moderate dementia.  Unable to participate in c

are planning.





At discharge temperature was 36.1.  Heart rate 62.  Blood pressure 105/73.  

Respirations 19.  99% on room air.  She was an alert cooperative elderly female 

but had no idea where she was, why she was here, or what it happened.  Neck was 

supple.  Lungs had coarse upper airway breath sounds and diminished at bases, 

but no respiratory distress or tachypnea.  Abdomen was soft, nontender.  She is 

incontinent of urine and has a pure wick catheter attached to her.  She is very 

hard of hearing, has no idea where she is.  Extremities have some trace edema.  

She is able to sit up on the bed with 2 assist, and dangle her feet but she does

not stand to walk.





Nursing home orders were written at discharge and greater than 30 minutes was 

spent coordinating discharge. Potassium was supplemented before she was 

discharged from the hospital for the low potassium seen on this morning's lab.











- ALLERGIES


Allergies/Adverse Reactions: 


                                    Allergies











Allergy/AdvReac Type Severity Reaction Status Date / Time


 


alendronate sodium Allergy  Unknown Verified 11/09/21 19:51





[From Fosamax]     


 


amoxicillin Allergy  Unknown Verified 11/09/21 19:51


 


oxycodone Allergy  Unknown Verified 11/09/21 19:51


 


risedronate sodium Allergy  Unknown Verified 11/09/21 19:51


 


Sulfa (Sulfonamide Allergy  Rash Verified 11/09/21 19:51





Antibiotics)     


 


tramadol Allergy  Unknown Verified 11/09/21 19:51














- MEDICATIONS


Home Medications: 


                                Ambulatory Orders











 Medication  Instructions  Recorded  Confirmed


 


Carbidopa/Levodopa [Carbidopa-Levo 1 tab PO BID 04/30/17 01/11/23





ER  Tab]   


 


Cholecalciferol (Vitamin D3) 2,000 unit PO DAILY 04/30/17 01/11/23





[Vitamin D3]   


 


Ipratropium Bromide 2 sprays CANDE BID 04/30/17 01/11/23


 


Mirtazapine 7.5 mg PO QPM 04/30/17 01/11/23


 


Pramipexole [Mirapex] 0.25 mg PO QPM 04/30/17 01/11/23


 


Acetaminophen [Tylenol] 650 mg PO Q4H PRN 01/11/23 01/11/23


 


Aspirin [Aspirin EC] 81 mg PO DAILY 01/11/23 01/11/23


 


Carbidopa/Levodopa 25/100 [Sinemet 1 tab PO 5XD 01/11/23 01/11/23





25 mg/100 mg]   


 


D-Mannose [Azo D-Mannose] 500 mg PO DAILY 01/11/23 01/11/23


 


Estrogens, Conjugated Cream 0.5 g VG MOFR 01/11/23 01/11/23





[Premarin Cream]   


 


Lidocaine Patch 5% [Lidoderm Patch] 1 patch TOP DAILY PRN 01/11/23 01/11/23


 


Metoprolol Succinate [Toprol Xl] 25 mg PO QPM 01/11/23 01/11/23


 


Sennosides [Senna] 17.2 mg PO DAILY 01/11/23 01/11/23


 


polyethylene glycoL 3350 [Miralax] 17 g PO DAILY 01/11/23 01/11/23














- LABS


Result Diagrams: 


                                 01/14/23 04:53





                                 01/14/23 04:53

## 2023-03-26 ENCOUNTER — HOSPITAL ENCOUNTER (INPATIENT)
Dept: HOSPITAL 76 - ED | Age: 88
LOS: 8 days | Discharge: HOME | DRG: 871 | End: 2023-04-03
Attending: SPECIALIST | Admitting: INTERNAL MEDICINE
Payer: MEDICARE

## 2023-03-26 ENCOUNTER — HOSPITAL ENCOUNTER (OUTPATIENT)
Dept: HOSPITAL 76 - EMS | Age: 88
Discharge: ANOTHER HOSPITAL | End: 2023-03-26
Payer: MEDICARE

## 2023-03-26 DIAGNOSIS — G20: ICD-10-CM

## 2023-03-26 DIAGNOSIS — N17.9: ICD-10-CM

## 2023-03-26 DIAGNOSIS — Z86.73: ICD-10-CM

## 2023-03-26 DIAGNOSIS — R63.0: ICD-10-CM

## 2023-03-26 DIAGNOSIS — A41.59: ICD-10-CM

## 2023-03-26 DIAGNOSIS — I95.9: ICD-10-CM

## 2023-03-26 DIAGNOSIS — R41.82: ICD-10-CM

## 2023-03-26 DIAGNOSIS — R06.02: ICD-10-CM

## 2023-03-26 DIAGNOSIS — R50.9: ICD-10-CM

## 2023-03-26 DIAGNOSIS — R53.1: ICD-10-CM

## 2023-03-26 DIAGNOSIS — Z66: ICD-10-CM

## 2023-03-26 DIAGNOSIS — N39.0: ICD-10-CM

## 2023-03-26 DIAGNOSIS — H91.90: ICD-10-CM

## 2023-03-26 DIAGNOSIS — Z16.23: ICD-10-CM

## 2023-03-26 DIAGNOSIS — E78.00: ICD-10-CM

## 2023-03-26 DIAGNOSIS — U07.1: ICD-10-CM

## 2023-03-26 DIAGNOSIS — E43: ICD-10-CM

## 2023-03-26 DIAGNOSIS — I10: ICD-10-CM

## 2023-03-26 DIAGNOSIS — E87.6: ICD-10-CM

## 2023-03-26 DIAGNOSIS — L89.152: ICD-10-CM

## 2023-03-26 DIAGNOSIS — R09.02: ICD-10-CM

## 2023-03-26 DIAGNOSIS — G93.41: ICD-10-CM

## 2023-03-26 DIAGNOSIS — A41.9: Primary | ICD-10-CM

## 2023-03-26 DIAGNOSIS — F02.80: ICD-10-CM

## 2023-03-26 DIAGNOSIS — R00.0: ICD-10-CM

## 2023-03-26 DIAGNOSIS — R65.21: ICD-10-CM

## 2023-03-26 DIAGNOSIS — R64: ICD-10-CM

## 2023-03-26 DIAGNOSIS — U07.1: Primary | ICD-10-CM

## 2023-03-26 DIAGNOSIS — R13.10: ICD-10-CM

## 2023-03-26 DIAGNOSIS — F02.82: ICD-10-CM

## 2023-03-26 DIAGNOSIS — E87.0: ICD-10-CM

## 2023-03-26 DIAGNOSIS — Z74.01: ICD-10-CM

## 2023-03-26 DIAGNOSIS — J18.9: ICD-10-CM

## 2023-03-26 LAB
ALBUMIN DIAFP-MCNC: 2.6 G/DL (ref 3.2–5.5)
ALBUMIN/GLOB SERPL: 1 {RATIO} (ref 1–2.2)
ALP SERPL-CCNC: 63 IU/L (ref 42–121)
ALT SERPL W P-5'-P-CCNC: < 10 IU/L (ref 10–60)
ANION GAP SERPL CALCULATED.4IONS-SCNC: 8 MMOL/L (ref 6–13)
APTT PPP: 34.5 SECS (ref 24.9–33.3)
AST SERPL W P-5'-P-CCNC: 32 IU/L (ref 10–42)
B PARAPERT DNA SPEC QL NAA+PROBE: NOT DETECTED
B PERT DNA SPEC QL NAA+PROBE: NOT DETECTED
BASOPHILS # BLD MANUAL: 0 10^3/UL (ref 0–0.1)
BASOPHILS NFR BLD AUTO: 0.6 %
BILIRUB BLD-MCNC: 0.8 MG/DL (ref 0.2–1)
BUN SERPL-MCNC: 44 MG/DL (ref 6–20)
C PNEUM DNA NPH QL NAA+NON-PROBE: NOT DETECTED
CALCIUM UR-MCNC: 8.4 MG/DL (ref 8.5–10.3)
CHLORIDE SERPL-SCNC: 105 MMOL/L (ref 101–111)
CLARITY UR REFRACT.AUTO: (no result)
CO2 SERPL-SCNC: 27 MMOL/L (ref 21–32)
CREAT SERPLBLD-SCNC: 2.3 MG/DL (ref 0.4–1)
EOSINOPHIL # BLD MANUAL: 0 10^3/UL (ref 0–0.7)
EOSINOPHIL NFR BLD AUTO: 0 %
ERYTHROCYTE [DISTWIDTH] IN BLOOD BY AUTOMATED COUNT: 15.1 % (ref 12–15)
FLUAV RNA RESP QL NAA+PROBE: NOT DETECTED
GFRSERPLBLD MDRD-ARVRAT: 20 ML/MIN/{1.73_M2} (ref 89–?)
GLOBULIN SER-MCNC: 2.7 G/DL (ref 2.1–4.2)
GLUCOSE SERPL-MCNC: 86 MG/DL (ref 70–100)
GLUCOSE UR QL STRIP.AUTO: NEGATIVE MG/DL
HAEM INFLU B DNA SPEC QL NAA+PROBE: NOT DETECTED
HCOV 229E RNA SPEC QL NAA+PROBE: NOT DETECTED
HCOV HKU1 RNA UPPER RESP QL NAA+PROBE: NOT DETECTED
HCOV NL63 RNA ASPIRATE QL NAA+PROBE: NOT DETECTED
HCOV OC43 RNA SPEC QL NAA+PROBE: NOT DETECTED
HCT VFR BLD AUTO: 36.5 % (ref 37–47)
HGB UR QL STRIP: 11.3 G/DL (ref 12–16)
HMPV AG SPEC QL: NOT DETECTED
HPIV1 RNA NPH QL NAA+PROBE: NOT DETECTED
HPIV2 SPEC QL CULT: NOT DETECTED
HPIV3 AB TITR SER CF: NOT DETECTED {TITER}
HPIV4 RNA SPEC QL NAA+PROBE: NOT DETECTED
INR PPP: 1.6 (ref 0.8–1.2)
KETONES UR QL STRIP.AUTO: (no result) MG/DL
LIPASE SERPL-CCNC: 19 U/L (ref 22–51)
LYMPH ABN NFR BLD MANUAL: 0 %
LYMPHOBLASTS # BLD: 8 %
LYMPHOCYTES # BLD MANUAL: 2.4 10^3/UL (ref 1.5–3.5)
LYMPHOCYTES NFR BLD AUTO: 3 %
M PNEUMO DNA SPEC QL NAA+PROBE: NOT DETECTED
MANUAL DIF COMMENT BLD-IMP: (no result)
MCH RBC QN AUTO: 26.9 PG (ref 27–31)
MCHC RBC AUTO-ENTMCNC: 31 G/DL (ref 32–36)
MCV RBC AUTO: 86.9 FL (ref 81–99)
METAMYELOCYTES NFR BLD: 2 %
MONOCYTES # BLD MANUAL: 0.9 10^3/UL (ref 0–1)
MONOCYTES NFR BLD AUTO: 3.8 %
NEUTROPHILS # SNV AUTO: 30.3 X10^3/UL (ref 4.8–10.8)
NEUTROPHILS NFR BLD AUTO: 85.3 %
NEUTROPHILS NFR BLD MANUAL: 26.4 10^3/UL (ref 1.5–6.6)
NEUTS BAND NFR BLD: 9 %
NITRITE UR QL STRIP.AUTO: NEGATIVE
PDW BLD AUTO: 11.7 FL (ref 7.9–10.8)
PH UR STRIP.AUTO: 7 PH (ref 5–7.5)
PLAT MORPH BLD: (no result)
PLATELET # BLD: 131 10^3/UL (ref 130–450)
PLATELET BLD QL SMEAR: (no result)
POTASSIUM SERPL-SCNC: 4 MMOL/L (ref 3.5–5)
PROT SPEC-MCNC: 5.3 G/DL (ref 6.7–8.2)
PROT UR STRIP.AUTO-MCNC: >=300 MG/DL
PROTHROM ACT/NOR PPP: 17.6 SECS (ref 9.9–12.6)
RBC # UR STRIP.AUTO: (no result) /UL
RBC # URNS HPF: (no result) /HPF (ref 0–5)
RBC MAR: 4.2 10^6/UL (ref 4.2–5.4)
RBC MORPH BLD: (no result)
RSV RNA RESP QL NAA+PROBE: NOT DETECTED
RV+EV RNA SPEC QL NAA+PROBE: NOT DETECTED
SARS-COV-2 RNA PNL SPEC NAA+PROBE: DETECTED
SODIUM SERPLBLD-SCNC: 140 MMOL/L (ref 135–145)
SP GR UR STRIP.AUTO: 1.02 (ref 1–1.03)
SQUAMOUS URNS QL MICRO: (no result)
UROBILINOGEN UR QL STRIP.AUTO: (no result) E.U./DL
UROBILINOGEN UR STRIP.AUTO-MCNC: NEGATIVE MG/DL
WBC # UR MANUAL: >25 /HPF (ref 0–5)

## 2023-03-26 PROCEDURE — 81003 URINALYSIS AUTO W/O SCOPE: CPT

## 2023-03-26 PROCEDURE — 85610 PROTHROMBIN TIME: CPT

## 2023-03-26 PROCEDURE — 87040 BLOOD CULTURE FOR BACTERIA: CPT

## 2023-03-26 PROCEDURE — 87635 SARS-COV-2 COVID-19 AMP PRB: CPT

## 2023-03-26 PROCEDURE — 87633 RESP VIRUS 12-25 TARGETS: CPT

## 2023-03-26 PROCEDURE — 87150 DNA/RNA AMPLIFIED PROBE: CPT

## 2023-03-26 PROCEDURE — 87077 CULTURE AEROBIC IDENTIFY: CPT

## 2023-03-26 PROCEDURE — 96365 THER/PROPH/DIAG IV INF INIT: CPT

## 2023-03-26 PROCEDURE — 71045 X-RAY EXAM CHEST 1 VIEW: CPT

## 2023-03-26 PROCEDURE — 80053 COMPREHEN METABOLIC PANEL: CPT

## 2023-03-26 PROCEDURE — 85025 COMPLETE CBC W/AUTO DIFF WBC: CPT

## 2023-03-26 PROCEDURE — 96375 TX/PRO/DX INJ NEW DRUG ADDON: CPT

## 2023-03-26 PROCEDURE — 51701 INSERT BLADDER CATHETER: CPT

## 2023-03-26 PROCEDURE — 87181 SC STD AGAR DILUTION PER AGT: CPT

## 2023-03-26 PROCEDURE — 80048 BASIC METABOLIC PNL TOTAL CA: CPT

## 2023-03-26 PROCEDURE — 99285 EMERGENCY DEPT VISIT HI MDM: CPT

## 2023-03-26 PROCEDURE — 83605 ASSAY OF LACTIC ACID: CPT

## 2023-03-26 PROCEDURE — 36415 COLL VENOUS BLD VENIPUNCTURE: CPT

## 2023-03-26 PROCEDURE — 87086 URINE CULTURE/COLONY COUNT: CPT

## 2023-03-26 PROCEDURE — 81001 URINALYSIS AUTO W/SCOPE: CPT

## 2023-03-26 PROCEDURE — 85730 THROMBOPLASTIN TIME PARTIAL: CPT

## 2023-03-26 PROCEDURE — 96361 HYDRATE IV INFUSION ADD-ON: CPT

## 2023-03-26 PROCEDURE — 83690 ASSAY OF LIPASE: CPT

## 2023-03-26 RX ADMIN — SODIUM CHLORIDE, PRESERVATIVE FREE SCH: 5 INJECTION INTRAVENOUS at 16:50

## 2023-03-26 RX ADMIN — DEXTROSE AND SODIUM CHLORIDE SCH MLS/HR: 5; 900 INJECTION, SOLUTION INTRAVENOUS at 12:56

## 2023-03-26 RX ADMIN — DEXTROSE AND SODIUM CHLORIDE SCH MLS/HR: 5; 900 INJECTION, SOLUTION INTRAVENOUS at 22:41

## 2023-03-26 RX ADMIN — RUGBY ZINC OXIDE 20% PRN APPLIC: 20 OINTMENT TOPICAL at 12:55

## 2023-03-26 NOTE — HISTORY & PHYSICAL EXAMINATION
Chief Complaint





- Chief Complaint


Chief Complaint: Obtunded and hypotensive at her nursing home.





History of Present Illness





- Admitted From


Admitted From:: ED





- History Obtained From


History obtained from: ED provider and chart review





- History of Present Illness


HPI Comment/Other: 





This is an 89-year-old white female with a history of Parkinson's disease with 

dementia, she was admitted here 2 months ago for sepsis from a UTI.  Her 

daughter was often at the bedside and did remark that she has hallucinations 

which are her baseline.


Today the staff at her facility noticed that she was more obtunded and had a low

blood pressure and EMS was called. Upon arrival to the ED she had systolic blood

pressure of 69.  Work-up showed that she is COVID-positive, chest x-ray has p

atchy infiltrates, she has elevated WBC of 30, elevated Lactic acid level of 

2.3, elevated BUN/creat 44/2.3 (her baseline creat is 0.4). She was started on 

IV fluids, has gotten 2 L.  Her blood pressure has improved to 85 systolic.  

Patient was desaturating at 87% on room air at presentation and she was put on 

O2 2L via nasal cannula, saturations improved to100%. The ED provider reached 

out to the daughter who herself has COVID, and the daughter said that the mother

tested positive for COVID 1 week ago, unknown how long symptoms have lasted 

however. The ED provider discussed with the daughter that the patient is in 

septic shock, and has a poor prognosis because of her underlying dementia, cu

rrent new COVID with desaturation, and new LAINE. She has a POLST in place that 

indicates DNR and limited interventions.  The daughter asked that everything be 

tried to treat her short of aggressive measures. The ED provider reached out to 

me on the Hospitalist team and we discussed the patient. She is being admitted 

to manage septic shock, UTI, HCAP, obtundation. She is in critical condition.


CODE BLUE status is DNR/DNI.








History





- Past Medical History


Cardiovascular: reports: Hypertension, High cholesterol


Respiratory: reports: None


Neuro: reports: Dementia, TIA, Parkinson's


Endocrine/Autoimmune: reports: None


Psych: reports: None


Musculoskeletal: reports: Osteoarthritis, Osteoporosis, Fatigue


MRSA Hx?: No





- Past Surgical History


/GYN: reports: Hysterectomy





Meds/Allgy





- Home Medications


Home Medications: 


                                Ambulatory Orders











 Medication  Instructions  Recorded  Confirmed


 


Carbidopa/Levodopa [Carbidopa-Levo 1 tab PO BID 04/30/17 03/26/23





ER  Tab]   


 


Ipratropium Bromide 2 sprays CANDE BID 04/30/17 03/26/23


 


Mirtazapine 7.5 mg PO QPM 04/30/17 03/26/23


 


Pramipexole [Mirapex] 0.25 mg PO QPM 04/30/17 03/26/23


 


Acetaminophen [Tylenol] 650 mg PO Q4H PRN 01/11/23 03/26/23


 


Aspirin [Aspirin EC] 81 mg PO DAILY 01/11/23 03/26/23


 


Carbidopa/Levodopa 25/100 [Sinemet 1 tab PO 5XD 01/11/23 03/26/23





25 mg/100 mg]   


 


D-Mannose [Azo D-Mannose] 500 mg PO DAILY 01/11/23 03/26/23


 


Estrogens, Conjugated Cream 0.5 g VG MOFR 01/11/23 03/26/23





[Premarin Cream]   


 


Lidocaine Patch 5% [Lidoderm Patch] 1 patch TOP DAILY PRN 01/11/23 03/26/23


 


Metoprolol Succinate [Toprol Xl] 12.5 mg PO QPM 01/11/23 03/26/23


 


Sennosides [Senna] 17.2 mg PO DAILY 01/11/23 03/26/23


 


polyethylene glycoL 3350 [Miralax] 17 g PO DAILY 01/11/23 03/26/23


 


Cholecalciferol (Vitamin D3) 1,250 mcg PO Q30D 03/26/23 03/26/23





[Decara]   














- Allergies


Allergies/Adverse Reactions: 


                                    Allergies











Allergy/AdvReac Type Severity Reaction Status Date / Time


 


alendronate sodium Allergy  Unknown Verified 03/26/23 08:54





[From Fosamax]     


 


amoxicillin Allergy  Unknown Verified 03/26/23 08:54


 


oxycodone Allergy  Unknown Verified 03/26/23 08:54


 


risedronate sodium Allergy  Unknown Verified 03/26/23 08:54


 


Sulfa (Sulfonamide Allergy  Rash Verified 03/26/23 08:54





Antibiotics)     


 


tramadol Allergy  Unknown Verified 03/26/23 08:54














Review of Systems





- All Other Systems


All Other Systems: reports: Other (Unable to obtain since she is obtunded)





Exam





- Vital Signs


Vital Signs: 





                                Vital Signs x48h











  Temp Pulse Resp BP Pulse Ox O2 Flow Rate


 


 03/26/23 12:00   94  23  72/48 L  100  2


 


 03/26/23 11:42   96  22  77/59 L  96  2


 


 03/26/23 10:32   98  30 H  85/65 L  100  2


 


 03/26/23 10:00   89  28 H  71/50 L  100  2


 


 03/26/23 09:46   96  30 H   100  2


 


 03/26/23 09:30   94  30 H  69/54 L  87 L 


 


 03/26/23 09:10   96  30 H  68/46 L  95 


 


 03/26/23 08:54  38.4 C H  99  30 H  61/45 L  96 














- Physical Exam


General Appearance: positive: Other (Obtunded white female, appears in no 

distress. She is cachectic.)


Eyes Bilateral: positive: No lid inflammation


ENT: positive: Dry mucous membranes


Neck: positive: Nml inspection


Respiratory: positive: No respiratory distress (While on O2 via nasal cannula)


Cardiovascular: positive: Regular rate & rhythm


Abdomen: positive: No distention


Extremities: positive: No pedal edema





Sepsis Event Note (H)





- Evaluation


Current Stage of Sepsis: Septic shock


Possible source of Sepsis: positive: Pulmonary, Genitourinary





- Sepsis Criteria


Sepsis Criteria: Respiratory: Increasing oxygen requirements, CNS: altered 

consciousness (unrelated to primary neuro pathology), MAP less than 65 mmHg, SBP

less than 90 mmHg, Metabolic: lactate > 2 mmol/L





Conclusion/Plan





- Problem List


(1) Septic shock


Conclusion/Plan: 


The patient presented with a blood pressure of 69 systolic, tachycardic, very 

high white count of 30, elevated lactic acid and the source appears to be either

COVID or healthcare associated pneumonia or UTI


Plan:


Continue with IV hydration


Because the patient's POLST indicated she wants comfort directed care, we will 

not plan to use pressors or put her in the ICU


We will start empiric IV antibiotics


Await culture results


The patient is considered to be in critical condition given her shock








(2) LAINE (acute kidney injury)


Conclusion/Plan: 


This patient's baseline creatinine is 0.4 (records and labs were reviewed). 

Presented now with BUN/creatinine of 44/2.3, consistent with marked volume 

depletion with this septic shock


Plan:


We will give IV fluids


Avoid nephrotoxins


Follow BMP daily.








(3) Obtundation


Conclusion/Plan: 


Patient is underlying dementia and Parkinson's.  She presented in obtunded 

condition 2 months ago also when she had sepsis


Plan:


We will continue to give IV hydration and IV antibiotics and assess her 

neurologic status with those treatments








(4) COVID


Conclusion/Plan: 


According to the ED providers discussion with the daughter by phone, this 

patient tested positive for COVID 1 week ago.


The patient has some hypoxia, patchy infiltrates on chest x-ray


Plan:


We will start IV Decadron.


The patient is not a candidate to start remdesivir given her onset being a week 

ago or more








(5) Hypoxia


Conclusion/Plan: 


This is likely from her pneumonia


Plan:


We will provide supplemental oxygen, saturation target will be 90% or above








(6) UTI (urinary tract infection)


Conclusion/Plan: 


Patient gets frequent UTIs.  The UA now showed a lot of squamous cells therefore

no culture was indicated.


Patient has already been started on empiric ceftriaxone which would treat a UTI 

as well and is treating this pneumonia


Plan:


We will not repeat a UA


Continue empiric iv ceftriaxone


Qualifiers: 


   Urinary tract infection type: site unspecified   Hematuria presence: without 

hematuria   Qualified Code(s): N39.0 - Urinary tract infection, site not 

specified   





(7) Parkinson's disease dementia


Conclusion/Plan: 


As per history.  I took care of this patient 2 months ago and remember her 

baseline mental status.  The daughter had told this that even at her best, the 

patient has hallucinations.


Plan:


If the patient awakens to start taking a diet and oral meds, her usual meds will

be resumed








- Lab Results


Fish Bones: 


                                 03/26/23 09:11





                                 03/26/23 09:11





- Diagnostic Imaging Results


Diagnostic Imaging Results: positive: Final report reviewed





- Other


Other Results/Comments: 





Attestation: The patient is expected to be discharged or transferred to another 

facility within 96 hours: Yes

## 2023-03-26 NOTE — XRAY REPORT
PROCEDURE:  Chest 1 View X-Ray

 

INDICATIONS:  hypoxia

 

TECHNIQUE:  One view of the chest was acquired.  

 

COMPARISON:  1/11/2023

 

FINDINGS:  

 

Surgical changes and devices:  None.  

 

Lungs and pleura:  No pleural effusions or pneumothorax. Mild diffuse reticulonodular pulmonary opaci
ty.

 

Mediastinum:  Mediastinal contours appear normal.  Heart size is normal.  

 

Bones and chest wall:  No suspicious bony lesions.  Overlying soft tissues appear unremarkable.  

 

 

IMPRESSION:  

 

Mild edema versus atypical pneumonia.

 

Reviewed by: Shantelle Peraza MD on 3/26/2023 9:18 AM PDT

Approved by: Shantelle Peraza MD on 3/26/2023 9:18 AM PDT

 

 

Station ID:  [id number]

## 2023-03-26 NOTE — ED PHYSICIAN DOCUMENTATION
History of Present Illness





- Stated complaint


Stated Complaint: ALOC/HYPOTENSION





- History obtained from


History obtained from: Patient, Family, EMS





- History of Present Illness


Pain level max: 0


Pain level now: 0





- Additonal information


Additional information: 





Patient is an 89-year-old female who lives at [facility].  She has a 

history of dementia, Parkinson's, behavioral disturbance, history of septic 

shock in January of this year.  Patient is DNR with limited interventions on her

POLST form.  EMS states that they were called today for decreased mental status 

and low blood pressure.  They state that she has been diagnosed with COVID but 

are unsure when she was diagnosed.  They were also told that she had a 

temperature of 100.3 today.





I contacted the patient's daughter, Lucila, upon arrival to the emergency 

department.  She states that her mother was diagnosed with COVID 1 week ago.  

She states that she would like everything done for her mother, she states that 

the patient is DNR, would not want intubation.  We will start with IV fluids.





Review of Systems


Unable to obtain: AMS, Dementia


Constitutional: reports: Fever





PD PAST MEDICAL HISTORY





- Past Medical History


Cardiovascular: Hypertension, High cholesterol


Respiratory: None


Endocrine/Autoimmune: None


Psych: None


Musculoskeletal: Osteoarthritis, Osteoporosis, Fatigue





- Past Surgical History


/GYN: Hysterectomy





- Present Medications


Home Medications: 


                                Ambulatory Orders











 Medication  Instructions  Recorded  Confirmed


 


Carbidopa/Levodopa [Carbidopa-Levo 1 tab PO BID 04/30/17 03/26/23





ER  Tab]   


 


Cholecalciferol (Vitamin D3) 2,000 unit PO DAILY 04/30/17 03/26/23





[Vitamin D3]   


 


Ipratropium Bromide 2 sprays CANDE BID 04/30/17 03/26/23


 


Mirtazapine 7.5 mg PO QPM 04/30/17 03/26/23


 


Pramipexole [Mirapex] 0.25 mg PO QPM 04/30/17 03/26/23


 


Acetaminophen [Tylenol] 650 mg PO Q4H PRN 01/11/23 03/26/23


 


Aspirin [Aspirin EC] 81 mg PO DAILY 01/11/23 03/26/23


 


Carbidopa/Levodopa 25/100 [Sinemet 1 tab PO 5XD 01/11/23 03/26/23





25 mg/100 mg]   


 


D-Mannose [Azo D-Mannose] 500 mg PO DAILY 01/11/23 03/26/23


 


Estrogens, Conjugated Cream 0.5 g VG MOFR 01/11/23 03/26/23





[Premarin Cream]   


 


Lidocaine Patch 5% [Lidoderm Patch] 1 patch TOP DAILY PRN 01/11/23 03/26/23


 


Metoprolol Succinate [Toprol Xl] 25 mg PO QPM 01/11/23 03/26/23


 


Sennosides [Senna] 17.2 mg PO DAILY 01/11/23 03/26/23


 


polyethylene glycoL 3350 [Miralax] 17 g PO DAILY PRN 01/11/23 03/26/23














- Allergies


Allergies/Adverse Reactions: 


                                    Allergies











Allergy/AdvReac Type Severity Reaction Status Date / Time


 


alendronate sodium Allergy  Unknown Verified 03/26/23 08:54





[From Fosamax]     


 


amoxicillin Allergy  Unknown Verified 03/26/23 08:54


 


oxycodone Allergy  Unknown Verified 03/26/23 08:54


 


risedronate sodium Allergy  Unknown Verified 03/26/23 08:54


 


Sulfa (Sulfonamide Allergy  Rash Verified 03/26/23 08:54





Antibiotics)     


 


tramadol Allergy  Unknown Verified 03/26/23 08:54














- Social History


Does the pt smoke?: No


Smoking Status: Never smoker





- Immunizations


Immunizations are current?: Yes





PD ED PE NORMAL





- Vitals


Vital signs reviewed: Yes





- General


General: Other (drowsy, minimal responsiveness. frail, cachectic)





- HEENT


HEENT: Other (dry lips and tongue)





- Neck


Neck: Supple, no meningeal sign





- Cardiac


Cardiac: RRR





- Respiratory


Respiratory: No respiratory distress, Other (diminished BS bilaterally.)





- Abdomen


Abdomen: Soft, Non tender, Non distended





- Derm


Derm: Warm and dry





- Extremities


Extremities: No edema





- Neuro


Eye Opening: None


Motor: Withdraws to Pain


Verbal: None


GCS Score: 6





Results





- Vitals


Vitals: 


                               Vital Signs - 24 hr











  03/26/23 03/26/23 03/26/23





  08:54 09:10 09:30


 


Temperature 38.4 C H  


 


Heart Rate 99 96 94


 


Respiratory 30 H 30 H 30 H





Rate   


 


Blood Pressure 61/45 L 68/46 L 69/54 L


 


O2 Saturation 96 95 87 L


 


If not protocol   





: Oxygen Flow,   





liters/minute   














  03/26/23 03/26/23 03/26/23





  09:46 10:00 10:32


 


Temperature   


 


Heart Rate 96 89 98


 


Respiratory 30 H 28 H 30 H





Rate   


 


Blood Pressure  71/50 L 85/65 L


 


O2 Saturation 100 100 100


 


If not protocol 2 2 2





: Oxygen Flow,   





liters/minute   








                                     Oxygen











O2 Source                      Nasal cannula


 


Oxygen Flow Rate               2

















- Labs


Labs: 


                                Laboratory Tests











  03/26/23 03/26/23 03/26/23





  09:11 09:11 09:11


 


WBC  30.3 H  


 


RBC  4.20  


 


Hgb  11.3 L  


 


Hct  36.5 L  


 


MCV  86.9  


 


MCH  26.9 L  


 


MCHC  31.0 L  


 


RDW  15.1 H  


 


Plt Count  131  


 


MPV  11.7 H  


 


Neut # (Auto)  Not Reportable  


 


Lymph # (Auto)  Not Reportable  


 


Mono # (Auto)  Not Reportable  


 


Eos # (Auto)  Not Reportable  


 


Baso # (Auto)  Not Reportable  


 


Absolute Nucleated RBC  Not Reportable  


 


Total Counted  100  


 


Band Neuts % (Manual)  9  


 


Abnorm Lymph % (Manual)  0  


 


Metamyelocytes %  2 H  


 


Nucleated RBC %  Not Reportable  


 


Neutrophils # (Manual)  26.4 H  


 


Lymphocytes # (Manual)  2.4  


 


Monocytes # (Manual)  0.9  


 


Eosinophils # (Manual)  0.0  


 


Basophils # (Manual)  0.0  


 


Differential Comment  MANUAL DIFFERENTIAL  


 


Platelet Estimate  NORMAL (130-450,000)  


 


Platelet Morphology  NORMAL APPEARANCE  


 


RBC Morph Micro Appear  NORMAL APPEARANCE  


 


PT   17.6 H 


 


INR   1.6 H 


 


APTT   34.5 H 


 


Sodium    140


 


Potassium    4.0


 


Chloride    105


 


Carbon Dioxide    27


 


Anion Gap    8.0


 


BUN    44 H


 


Creatinine    2.3 H


 


Estimated GFR (MDRD)    20 L


 


Glucose    86


 


Lactic Acid   


 


Calcium    8.4 L


 


Total Bilirubin    0.8


 


AST    32


 


ALT    < 10 L


 


Alkaline Phosphatase    63


 


Total Protein    5.3 L


 


Albumin    2.6 L


 


Globulin    2.7


 


Albumin/Globulin Ratio    1.0


 


Lipase    19 L


 


Urine Color   


 


Urine Clarity   


 


Urine pH   


 


Ur Specific Gravity   


 


Urine Protein   


 


Urine Glucose (UA)   


 


Urine Ketones   


 


Urine Occult Blood   


 


Urine Nitrite   


 


Urine Bilirubin   


 


Urine Urobilinogen   


 


Ur Leukocyte Esterase   


 


Urine RBC   


 


Urine WBC   


 


Ur Squamous Epith Cells   


 


Urine Bacteria   


 


Ur Microscopic Review   


 


Urine Culture Comments   


 


Nasal Adenovirus (PCR)   


 


Nasal B. parapertussis DNA (PCR)   


 


Nasal Coronavir 229E PCR   


 


Nasal Coronavir HKU1 PCR   


 


Nasal Coronavir NL63 PCR   


 


Nasal Coronavir OC43 PCR   


 


Nasal Enterovir/Rhinovir PCR   


 


Nasal Influenza B PCR   


 


Nasal Influenza A PCR   


 


Nasal Parainfluen 1 PCR   


 


Nasal Parainfluen 2 PCR   


 


Nasal Parainfluen 3 PCR   


 


Nasal Parainfluen 4 PCR   


 


Nasal RSV (PCR)   


 


Nasal B.pertussis DNA PCR   


 


Nasal C.pneumoniae (PCR)   


 


Cande Human Metapneumo PCR   


 


Nasal M.pneumoniae (PCR)   


 


Nasal SARS-CoV-2 (PCR)   














  03/26/23 03/26/23 03/26/23





  09:11 09:20 09:40


 


WBC   


 


RBC   


 


Hgb   


 


Hct   


 


MCV   


 


MCH   


 


MCHC   


 


RDW   


 


Plt Count   


 


MPV   


 


Neut # (Auto)   


 


Lymph # (Auto)   


 


Mono # (Auto)   


 


Eos # (Auto)   


 


Baso # (Auto)   


 


Absolute Nucleated RBC   


 


Total Counted   


 


Band Neuts % (Manual)   


 


Abnorm Lymph % (Manual)   


 


Metamyelocytes %   


 


Nucleated RBC %   


 


Neutrophils # (Manual)   


 


Lymphocytes # (Manual)   


 


Monocytes # (Manual)   


 


Eosinophils # (Manual)   


 


Basophils # (Manual)   


 


Differential Comment   


 


Platelet Estimate   


 


Platelet Morphology   


 


RBC Morph Micro Appear   


 


PT   


 


INR   


 


APTT   


 


Sodium   


 


Potassium   


 


Chloride   


 


Carbon Dioxide   


 


Anion Gap   


 


BUN   


 


Creatinine   


 


Estimated GFR (MDRD)   


 


Glucose   


 


Lactic Acid  2.3 H  


 


Calcium   


 


Total Bilirubin   


 


AST   


 


ALT   


 


Alkaline Phosphatase   


 


Total Protein   


 


Albumin   


 


Globulin   


 


Albumin/Globulin Ratio   


 


Lipase   


 


Urine Color    DARK YELLOW


 


Urine Clarity    CLOUDY


 


Urine pH    7.0


 


Ur Specific Gravity    1.025


 


Urine Protein    >=300 H


 


Urine Glucose (UA)    NEGATIVE


 


Urine Ketones    TRACE


 


Urine Occult Blood    MODERATE H


 


Urine Nitrite    NEGATIVE


 


Urine Bilirubin    NEGATIVE


 


Urine Urobilinogen    0.2 (NORMAL)


 


Ur Leukocyte Esterase    LARGE H


 


Urine RBC    TNTC H


 


Urine WBC    >25 H


 


Ur Squamous Epith Cells    MOD Squamous H


 


Urine Bacteria    Many H


 


Ur Microscopic Review    INDICATED


 


Urine Culture Comments    NOT INDICATED


 


Nasal Adenovirus (PCR)   NOT DETECTED 


 


Nasal B. parapertussis DNA (PCR)   NOT DETECTED 


 


Nasal Coronavir 229E PCR   NOT DETECTED 


 


Nasal Coronavir HKU1 PCR   NOT DETECTED 


 


Nasal Coronavir NL63 PCR   NOT DETECTED 


 


Nasal Coronavir OC43 PCR   NOT DETECTED 


 


Nasal Enterovir/Rhinovir PCR   NOT DETECTED 


 


Nasal Influenza B PCR   NOT DETECTED 


 


Nasal Influenza A PCR   NOT DETECTED 


 


Nasal Parainfluen 1 PCR   NOT DETECTED 


 


Nasal Parainfluen 2 PCR   NOT DETECTED 


 


Nasal Parainfluen 3 PCR   NOT DETECTED 


 


Nasal Parainfluen 4 PCR   NOT DETECTED 


 


Nasal RSV (PCR)   NOT DETECTED 


 


Nasal B.pertussis DNA PCR   NOT DETECTED 


 


Nasal C.pneumoniae (PCR)   NOT DETECTED 


 


Cande Human Metapneumo PCR   NOT DETECTED 


 


Nasal M.pneumoniae (PCR)   NOT DETECTED 


 


Nasal SARS-CoV-2 (PCR)   DETECTED A 














- Rads (name of study)


  ** cxr


Relevant Findings:: Final report received, See rad report





PD Medical Decision Making





- ED course


Complexity details: reviewed results, re-evaluated patient, considered 

differential, d/w patient


ED course: 





89-year-old female with COVID, pneumonia, UTI.  Started on Rocephin and 

azithromycin.  Severely hypotensive upon arrival, responded somewhat to IV 

fluids.  She is DNR with limited interventions.  She was hypoxic as well and 

started on 2 L of oxygen. Her CBC shows a white blood cell count of over 30,000.

 26,000 neutrophils.  Her coagulation studies show an elevated INR at 1.6.  

History reveals acute renal insufficiency, creatinine 2.3, her baseline is 0.4. 

Lactic acid is 2.3.  Urinalysis is consistent with a UTI, many bacteria and 

white cells.  Positive for COVID on respiratory PCR.  Patient has sepsis with 

multisystem organ failure.  We will admit the patient for further care.  

Discussed the case with Dr. Peterson, hospitalist who accepts











This document was made in part using voice recognition software. While efforts 

are made to proofread this document, sound alike and grammatical errors may 

occur.











- Sepsis Event


Sepsis Onset Date: 03/26/23


Sepsis Onset Time: 10:00


Current Stage of Sepsis: Sepsis


Initial Hypotension: MAP less than 65 mmHg


Persistent Hypotension: MAP less than 65 mmHg, SBP less than 90 mmHg


Possible source of Sepsis: Pulmonary, Genitourinary


Mental/Cognitive Status: Other (dementia, parkinson's)


Reason for not giving 30ml/kg crystalloid fluids: Bolus previously given, Fluid 

overload potential


Capillary refill: Greater than 2 seconds


Peripheral Pulse Strength: 1+ Faint


Peripheral Pulse Location: Radial


Bedside ultrasound performed: No





Departure





- Departure


Disposition: [facility] DC/Xfer


Clinical Impression: 


 COVID, Hypoxia





Pneumonia


Qualifiers:


 Pneumonia type: due to unspecified organism Laterality: bilateral Lung 

location: unspecified part of lung Qualified Code(s): J18.9 - Pneumonia, 

unspecified organism





UTI (urinary tract infection)


Qualifiers:


 Urinary tract infection type: site unspecified Hematuria presence: without 

hematuria Qualified Code(s): N39.0 - Urinary tract infection, site not specified





Sepsis


Qualifiers:


 Sepsis type: sepsis due to unspecified organism Sepsis acute organ dysfunction 

status: with acute organ dysfunction Severe sepsis acute organ dysfunction type:

acute renal failure Acute renal failure type: unspecified Severe sepsis shock 

status: with septic shock Qualified Code(s): A41.9 - Sepsis, unspecified 

organism; R65.21 - Severe sepsis with septic shock; N17.9 - Acute kidney 

failure, unspecified





Hypotension


Qualifiers:


 Hypotension type: unspecified hypotension type Qualified Code(s): I95.9 - 

Hypotension, unspecified





Condition: Poor

## 2023-03-26 NOTE — PHARMACY PROGRESS NOTE
- Best Possible Medication History


Admit Date and Time: 03/26/23 1133


Processed by: Pharmacy


Medication History completed: Yes


Secondary Source(s): Facility MAR as ONLY source





As the person ultimately responsible for medication therapy, providers are able 

to order a medication from an existing home medication list in [facility] via the 

"Reconcile Routine" prior to Confirmation of that medication by support staff. 

Such practice is discouraged except when the physician, in their clinical 

judgment, deems that a medical need exists for a medication without regard to 

previous use.

## 2023-03-27 LAB
ANION GAP SERPL CALCULATED.4IONS-SCNC: 5 MMOL/L (ref 6–13)
BASOPHILS # BLD MANUAL: 0 10^3/UL (ref 0–0.1)
BASOPHILS NFR BLD AUTO: 0.4 %
BUN SERPL-MCNC: 45 MG/DL (ref 6–20)
CALCIUM UR-MCNC: 8.1 MG/DL (ref 8.5–10.3)
CHLORIDE SERPL-SCNC: 114 MMOL/L (ref 101–111)
CO2 SERPL-SCNC: 24 MMOL/L (ref 21–32)
CREAT SERPLBLD-SCNC: 1.2 MG/DL (ref 0.4–1)
EOSINOPHIL # BLD MANUAL: 0 10^3/UL (ref 0–0.7)
EOSINOPHIL NFR BLD AUTO: 0.2 %
ERYTHROCYTE [DISTWIDTH] IN BLOOD BY AUTOMATED COUNT: 15.4 % (ref 12–15)
GFRSERPLBLD MDRD-ARVRAT: 42 ML/MIN/{1.73_M2} (ref 89–?)
GLUCOSE SERPL-MCNC: 121 MG/DL (ref 70–100)
HCT VFR BLD AUTO: 35.1 % (ref 37–47)
HGB UR QL STRIP: 10.6 G/DL (ref 12–16)
LYMPH ABN NFR BLD MANUAL: 0 %
LYMPHOBLASTS # BLD: 6 %
LYMPHOCYTES # BLD MANUAL: 1.2 10^3/UL (ref 1.5–3.5)
LYMPHOCYTES NFR BLD AUTO: 3.8 %
MANUAL DIF COMMENT BLD-IMP: (no result)
MCH RBC QN AUTO: 26.4 PG (ref 27–31)
MCHC RBC AUTO-ENTMCNC: 30.2 G/DL (ref 32–36)
MCV RBC AUTO: 87.3 FL (ref 81–99)
METAMYELOCYTES NFR BLD: 2 %
MONOCYTES # BLD MANUAL: 0.8 10^3/UL (ref 0–1)
MONOCYTES NFR BLD AUTO: 2.2 %
NEUTROPHILS # SNV AUTO: 19.9 X10^3/UL (ref 4.8–10.8)
NEUTROPHILS NFR BLD AUTO: 83.9 %
NEUTROPHILS NFR BLD MANUAL: 17.5 10^3/UL (ref 1.5–6.6)
NEUTS BAND NFR BLD: 3 %
PDW BLD AUTO: 11.8 FL (ref 7.9–10.8)
PLAT MORPH BLD: (no result)
PLATELET # BLD: 103 10^3/UL (ref 130–450)
PLATELET BLD QL SMEAR: (no result)
POTASSIUM SERPL-SCNC: 3 MMOL/L (ref 3.5–5)
RBC MAR: 4.02 10^6/UL (ref 4.2–5.4)
RBC MORPH BLD: (no result)
SODIUM SERPLBLD-SCNC: 143 MMOL/L (ref 135–145)
WBC MORPH BLD: (no result)

## 2023-03-27 RX ADMIN — ENOXAPARIN SODIUM SCH MG: 100 INJECTION SUBCUTANEOUS at 08:31

## 2023-03-27 RX ADMIN — POTASSIUM CHLORIDE SCH MLS/HR: 7.46 INJECTION, SOLUTION INTRAVENOUS at 13:19

## 2023-03-27 RX ADMIN — POTASSIUM CHLORIDE SCH MLS/HR: 7.46 INJECTION, SOLUTION INTRAVENOUS at 11:52

## 2023-03-27 RX ADMIN — POTASSIUM CHLORIDE SCH MLS/HR: 7.46 INJECTION, SOLUTION INTRAVENOUS at 10:22

## 2023-03-27 RX ADMIN — SODIUM CHLORIDE, PRESERVATIVE FREE SCH ML: 5 INJECTION INTRAVENOUS at 00:20

## 2023-03-27 RX ADMIN — POTASSIUM CHLORIDE SCH MLS/HR: 7.46 INJECTION, SOLUTION INTRAVENOUS at 14:48

## 2023-03-27 RX ADMIN — SODIUM CHLORIDE SCH MLS/HR: 9 INJECTION, SOLUTION INTRAVENOUS at 13:18

## 2023-03-27 RX ADMIN — SODIUM CHLORIDE SCH MLS/HR: 900 INJECTION INTRAVENOUS at 12:42

## 2023-03-27 RX ADMIN — DEXTROSE AND SODIUM CHLORIDE SCH MLS/HR: 5; 900 INJECTION, SOLUTION INTRAVENOUS at 17:38

## 2023-03-27 RX ADMIN — RUGBY ZINC OXIDE 20% PRN APPLIC: 20 OINTMENT TOPICAL at 08:37

## 2023-03-27 RX ADMIN — SODIUM CHLORIDE, PRESERVATIVE FREE SCH ML: 5 INJECTION INTRAVENOUS at 16:23

## 2023-03-27 RX ADMIN — DEXTROSE AND SODIUM CHLORIDE SCH MLS/HR: 5; 900 INJECTION, SOLUTION INTRAVENOUS at 08:31

## 2023-03-27 RX ADMIN — SODIUM CHLORIDE, PRESERVATIVE FREE SCH: 5 INJECTION INTRAVENOUS at 08:31

## 2023-03-27 NOTE — PROVIDER PROGRESS NOTE
Assessment/Plan





- Problem List


(1) Septic shock


Assessment/Plan: 


The patient presented with a blood pressure of 69 systolic, tachycardic, very 

high white count of 30, elevated lactic acid and the source appears to be either

COVID or healthcare associated pneumonia or UTI


Plan:


Continue with IV hydration


The patient is considered to be in critical condition given her shock. Because 

the patient's POLST indicated she wants comfort directed care, we will not plan 

to use pressors or put her in the ICU, this was discussed with the daughter 

today by phone.


Cont empiric IV antibiotics


Await culture results





(2) LAINE (acute kidney injury)


Conclusion/Plan: 


This patient's baseline creatinine is 0.4 (records and labs were reviewed). 

Presented now with BUN/creatinine of 44/2.3, consistent with marked volume 

depletion with this septic shock


Plan:


Cont to give IV fluids


Avoid nephrotoxins


Follow BMP daily.





(3) Obtundation


Conclusion/Plan: 


Patient is underlying dementia and Parkinson's.  She presented in obtunded 

condition 2 months ago also when she had sepsis. The daughter asked the ED 

provider to do everything and she has responded to iv fluids and iv antibx. Her 

RN witnessed her to wake up briefly, enough to swallow. By my exam, she is still

obtunded.


Plan:


We will continue to give IV hydration and IV antibiotics and assess her 

neurologic status with those treatments


Will order a diet





(4) COVID


Conclusion/Plan: 


According to the ED providers discussion with the daughter by phone, this 

patient tested positive for COVID just over 1 week before presenting.


The patient has hypoxia, patchy infiltrates on chest x-ray


Plan:


We will continue IV Decadron.


The patient was not a candidate to start Remdesivir given her onset being a week

ago or more





(5) Hypoxia


Conclusion/Plan: 


This is likely from her pneumonia


Plan:


We will provide supplemental oxygen, saturation target will be 90% or above





(6) UTI (urinary tract infection)


Conclusion/Plan: 


Patient gets frequent UTIs.  The U/A now showed a lot of squamous cells 

therefore no culture was indicated.


Patient has already been started on empiric ceftriaxone which would treat a UTI 

as well as treating her pneumonia


Plan:


We will not repeat a UA


Continue empiric iv ceftriaxone


Qualifiers: 


   Urinary tract infection type: site unspecified   Hematuria presence: without 

hematuria   Qualified Code(s): N39.0 - Urinary tract infection, site not 

specified   





(7) Parkinson's disease dementia


Conclusion/Plan: 


As per history.  I took care of this patient 2 months ago and remember her 

baseline mental status.  The daughter had told us that, even at her best, the 

patient has hallucinations.


Plan:


If the patient awakens to start taking a diet and oral meds, her usual meds will

be resumed





(8) Cachexia R64


As per Hx and current clinical status


Plan:


If she awakens and survives this septic shock, will ask for Nutrition consult.











- Current Meds


Current Meds: 





                               Current Medications











Generic Name Dose Route Start Last Admin





  Trade Name Freq  PRN Reason Stop Dose Admin


 


Enoxaparin Sodium  30 mg  03/27/23 09:00  03/27/23 08:31





  Enoxaparin 30 Mg/0.3 Ml Syringe  SUBQ   30 mg





  DAILY [facility]   Administration


 


Dextrose/Sodium Chloride  1,000 mls @ 100 mls/hr  03/26/23 12:00  03/27/23 17:38





  D5ns  IV   100 mls/hr





  .Q10H [facility]   Administration


 


Ceftriaxone Sodium 2 gm/  100 mls @ 200 mls/hr  03/27/23 11:00  03/27/23 13:12





  Sodium Chloride  IV   Infused





  DAILY [facility]   Infusion


 


Azithromycin 500 mg/ Sodium  250 mls @ 250 mls/hr  03/27/23 11:00  03/27/23 

14:18





  Chloride  IV  03/29/23 00:01  Infused





  DAILY [facility]   Infusion


 


Multi-Ingredient Ointment  1 applic  03/26/23 12:43  03/27/23 08:37





  Zinc Oxide 20% Oint 30 Gm Tube  TOP   1 applic





  PRN PRN   Administration





  Skin Care  


 


Sodium Chloride  10 ml  03/26/23 17:00  03/27/23 16:23





  Sodium Chloride Flush 0.9% 10 Ml Syringe  IVP   10 ml





  0100,0900,1700 [facility]   Administration














- Lab Result


Fish Bone Diagrams: 


                                 03/29/23 08:00





                                 03/29/23 08:00





- Additional Planning


My Orders: 





My Active Orders





03/26/23 17:00


Sodium Chloride Flush 0.9% [Normal Saline Flush 0.9%]   10 ml IVP 0100,0900,1700







03/27/23 09:00


Enoxaparin [Lovenox]   30 mg SUBQ DAILY 





03/27/23 11:00


Azithromycin Inj [Zithromax Inj] 500 mg   Sodium Chloride 0.9% [Normal Saline 

0.9%] 250 ml IV DAILY 


cefTRIAXone [Rocephin] 2 gm   Sodium Chloride 0.9% Minibag [Normal Saline 0.9% 

Minibag] 100 ml IV DAILY 





03/27/23 Dinner


DIET [Dysphagia - Puree] [DIET] 














Subjective





- Subjective


Nursing Reports: Other (Rn reported that she awoke, could swallow, then fell 

asleep)





Objective


Vital Signs: 





                               Vital Signs - 24 hr











  03/26/23 03/26/23 03/27/23





  19:05 21:00 00:18


 


Temperature  36.1 C L 36.3 C L


 


Heart Rate [  90 94





Brachial]   


 


Respiratory  24 20





Rate   


 


Blood Pressure  94/59 L 





[Left Brachial   





artery]   


 


Blood Pressure   





[Right Brachial   





artery]   


 


Blood Pressure   94/57 L





[right arm]   


 


O2 Saturation  97 96


 


If not protocol 1.5 1.5 1





: Oxygen Flow,   





liters/minute   














  03/27/23 03/27/23 03/27/23





  05:48 07:00 08:09


 


Temperature 36.4 C L  36.3 C L


 


Heart Rate [ 59 L  76





Brachial]   


 


Respiratory 24  22





Rate   


 


Blood Pressure   





[Left Brachial   





artery]   


 


Blood Pressure   





[Right Brachial   





artery]   


 


Blood Pressure 102/67  104/64





[right arm]   


 


O2 Saturation 99  100


 


If not protocol 1 1 





: Oxygen Flow,   





liters/minute   














  03/27/23 03/27/23





  11:52 16:41


 


Temperature 36.1 C L 36.5 C


 


Heart Rate [ 91 83





Brachial]  


 


Respiratory 22 20





Rate  


 


Blood Pressure  





[Left Brachial  





artery]  


 


Blood Pressure  121/87 H





[Right Brachial  





artery]  


 


Blood Pressure 121/75 





[right arm]  


 


O2 Saturation 99 98


 


If not protocol  





: Oxygen Flow,  





liters/minute  








                                     Oxygen











O2 Source                      Room air


 


Oxygen Flow Rate               2














I&O (Last 24 Hrs): 





                          Intake and Output Totals x24h











 03/25/23 03/26/23 03/27/23





 23:59 23:59 23:59


 


Intake Total  4087.5 2715.000


 


Output Total  75 550


 


Balance  4012.5 2165.000











General: Other (Obtunded, does wthdraw to pain)


HEENT: Other (Cachectic, sunken eyes, dry oral mucosa)


Neuro: Other (Obtunded, does respond to pain on my exam)


Cardiovascular: No murmurs


Respiratory: No respiratory distress (on suppl O2)


Abdomen: Soft, Other (thin)


Extremities: No clubbing, No edema, Other (Muscle wasting, skin tenting)





- Results


Results: 





                               Laboratory Results











WBC  19.9 x10^3/uL (4.8-10.8)  H  03/27/23  05:42    


 


RBC  4.02 10^6/uL (4.20-5.40)  L  03/27/23  05:42    


 


Hgb  10.6 g/dL (12.0-16.0)  L  03/27/23  05:42    


 


Hct  35.1 % (37.0-47.0)  L  03/27/23  05:42    


 


MCV  87.3 fL (81.0-99.0)   03/27/23  05:42    


 


MCH  26.4 pg (27.0-31.0)  L  03/27/23  05:42    


 


MCHC  30.2 g/dL (32.0-36.0)  L  03/27/23  05:42    


 


RDW  15.4 % (12.0-15.0)  H  03/27/23  05:42    


 


Plt Count  103 10^3/uL (130-450)  L  03/27/23  05:42    


 


MPV  11.8 fL (7.9-10.8)  H  03/27/23  05:42    


 


Neut # (Auto)  Not Reportable   03/27/23  05:42    


 


Lymph # (Auto)  Not Reportable   03/27/23  05:42    


 


Mono # (Auto)  Not Reportable   03/27/23  05:42    


 


Eos # (Auto)  Not Reportable   03/27/23  05:42    


 


Baso # (Auto)  Not Reportable   03/27/23  05:42    


 


Absolute Nucleated RBC  Not Reportable   03/27/23  05:42    


 


Total Counted  100   03/27/23  05:42    


 


Band Neuts % (Manual)  3 % (0-10)  03/27/23  05:42    


 


Abnorm Lymph % (Manual)  0 %  03/27/23  05:42    


 


Metamyelocytes %  2 % (-0) H  03/27/23  05:42    


 


Nucleated RBC %  Not Reportable   03/27/23  05:42    


 


Neutrophils # (Manual)  17.5 10^3/uL (1.5-6.6)  H  03/27/23  05:42    


 


Lymphocytes # (Manual)  1.2 10^3/uL (1.5-3.5)  L  03/27/23  05:42    


 


Monocytes # (Manual)  0.8 10^3/uL (0.0-1.0)   03/27/23  05:42    


 


Eosinophils # (Manual)  0.0 10^3/uL (0-0.7)   03/27/23  05:42    


 


Basophils # (Manual)  0.0 10^3/uL (0-0.1)   03/27/23  05:42    


 


Differential Comment  MANUAL DIFFERENTIAL   03/27/23  05:42    


 


WBC Morphology  NORMAL APPEARANCE  (NORMAL)   03/27/23  05:42    


 


Platelet Estimate  DECREASED (<130,000)  (NORMAL)   03/27/23  05:42    


 


Platelet Morphology  NORMAL APPEARANCE  (NORMAL)   03/27/23  05:42    


 


RBC Morph Micro Appear  NORMAL APPEARANCE  (NORMAL)   03/27/23  05:42    


 


PT  17.6 secs (9.9-12.6)  H  03/26/23  09:11    


 


INR  1.6  (0.8-1.2)  H  03/26/23  09:11    


 


APTT  34.5 secs (24.9-33.3)  H  03/26/23  09:11    


 


Sodium  143 mmol/L (135-145)   03/27/23  05:42    


 


Potassium  3.0 mmol/L (3.5-5.0)  L  03/27/23  05:42    


 


Chloride  114 mmol/L (101-111)  H  03/27/23  05:42    


 


Carbon Dioxide  24 mmol/L (21-32)   03/27/23  05:42    


 


Anion Gap  5.0  (6-13)  L  03/27/23  05:42    


 


BUN  45 mg/dL (6-20)  H  03/27/23  05:42    


 


Creatinine  1.2 mg/dL (0.4-1.0)  H  03/27/23  05:42    


 


Estimated GFR (MDRD)  42  (>89)  L  03/27/23  05:42    


 


Glucose  121 mg/dL ()  H  03/27/23  05:42    


 


Lactic Acid  1.5 mmol/L (0.5-2.2)   03/26/23  19:46    


 


Calcium  8.1 mg/dL (8.5-10.3)  L  03/27/23  05:42    


 


Total Bilirubin  0.8 mg/dL (0.2-1.0)   03/26/23  09:11    


 


AST  32 IU/L (10-42)   03/26/23  09:11    


 


ALT  < 10 IU/L (10-60)  L  03/26/23  09:11    


 


Alkaline Phosphatase  63 IU/L ()   03/26/23  09:11    


 


Total Protein  5.3 g/dL (6.7-8.2)  L  03/26/23  09:11    


 


Albumin  2.6 g/dL (3.2-5.5)  L  03/26/23  09:11    


 


Globulin  2.7 g/dL (2.1-4.2)   03/26/23  09:11    


 


Albumin/Globulin Ratio  1.0  (1.0-2.2)   03/26/23  09:11    


 


Lipase  19 U/L (22-51)  L  03/26/23  09:11    


 


Urine Color  DARK YELLOW   03/26/23  09:40    


 


Urine Clarity  CLOUDY  (CLEAR)   03/26/23  09:40    


 


Urine pH  7.0 PH (5.0-7.5)   03/26/23  09:40    


 


Ur Specific Gravity  1.025  (1.002-1.030)   03/26/23  09:40    


 


Urine Protein  >=300 mg/dL (NEGATIVE)  H  03/26/23  09:40    


 


Urine Glucose (UA)  NEGATIVE mg/dL (NEGATIVE)   03/26/23  09:40    


 


Urine Ketones  TRACE mg/dL (NEGATIVE)   03/26/23  09:40    


 


Urine Occult Blood  MODERATE  (NEGATIVE)  H  03/26/23  09:40    


 


Urine Nitrite  NEGATIVE  (NEGATIVE)   03/26/23  09:40    


 


Urine Bilirubin  NEGATIVE  (NEGATIVE)   03/26/23  09:40    


 


Urine Urobilinogen  0.2 (NORMAL) E.U./dL (NORMAL)   03/26/23  09:40    


 


Ur Leukocyte Esterase  LARGE  (NEGATIVE)  H  03/26/23  09:40    


 


Urine RBC  TNTC /HPF (0-5)  H  03/26/23  09:40    


 


Urine WBC  >25 /HPF (0-5)  H  03/26/23  09:40    


 


Ur Squamous Epith Cells  MOD Squamous  (<= Few)  H  03/26/23  09:40    


 


Urine Bacteria  Many /HPF (None Seen)  H  03/26/23  09:40    


 


Ur Microscopic Review  INDICATED   03/26/23  09:40    


 


Urine Culture Comments  NOT INDICATED   03/26/23  09:40    


 


Nasal Adenovirus (PCR)  NOT DETECTED   03/26/23  09:20    


 


Nasal B. parapertussis DNA (PCR)  NOT DETECTED   03/26/23  09:20    


 


Nasal Coronavir 229E PCR  NOT DETECTED   03/26/23  09:20    


 


Nasal Coronavir HKU1 PCR  NOT DETECTED   03/26/23  09:20    


 


Nasal Coronavir NL63 PCR  NOT DETECTED   03/26/23  09:20    


 


Nasal Coronavir OC43 PCR  NOT DETECTED   03/26/23  09:20    


 


Nasal Enterovir/Rhinovir PCR  NOT DETECTED   03/26/23  09:20    


 


Nasal Influenza B PCR  NOT DETECTED   03/26/23  09:20    


 


Nasal Influenza A PCR  NOT DETECTED   03/26/23  09:20    


 


Nasal Parainfluen 1 PCR  NOT DETECTED   03/26/23  09:20    


 


Nasal Parainfluen 2 PCR  NOT DETECTED   03/26/23  09:20    


 


Nasal Parainfluen 3 PCR  NOT DETECTED   03/26/23  09:20    


 


Nasal Parainfluen 4 PCR  NOT DETECTED   03/26/23  09:20    


 


Nasal RSV (PCR)  NOT DETECTED   03/26/23  09:20    


 


Nasal B.pertussis DNA PCR  NOT DETECTED   03/26/23  09:20    


 


Nasal C.pneumoniae (PCR)  NOT DETECTED   03/26/23  09:20    


 


Yousif Human Metapneumo PCR  NOT DETECTED   03/26/23  09:20    


 


Nasal M.pneumoniae (PCR)  NOT DETECTED   03/26/23  09:20    


 


Nasal SARS-CoV-2 (PCR)  DETECTED  A  03/26/23  09:20    














Sepsis Event Note (H)





- Evaluation


Current Stage of Sepsis: Septic shock


Possible source of Sepsis: positive: Pulmonary, Genitourinary





- Sepsis Criteria


Sepsis Criteria: Respiratory: Increasing oxygen requirements, CNS: altered 

consciousness (unrelated to primary neuro pathology), MAP less than 65 mmHg, SBP

less than 90 mmHg, Metabolic: lactate > 2 mmol/L

## 2023-03-28 LAB
ANION GAP SERPL CALCULATED.4IONS-SCNC: 2 MMOL/L (ref 6–13)
BUN SERPL-MCNC: 30 MG/DL (ref 6–20)
CALCIUM UR-MCNC: 8.2 MG/DL (ref 8.5–10.3)
CHLORIDE SERPL-SCNC: 122 MMOL/L (ref 101–111)
CO2 SERPL-SCNC: 21 MMOL/L (ref 21–32)
CREAT SERPLBLD-SCNC: 0.5 MG/DL (ref 0.4–1)
GFRSERPLBLD MDRD-ARVRAT: 116 ML/MIN/{1.73_M2} (ref 89–?)
GLUCOSE SERPL-MCNC: 136 MG/DL (ref 70–100)
POTASSIUM SERPL-SCNC: 3.3 MMOL/L (ref 3.5–5)
SODIUM SERPLBLD-SCNC: 145 MMOL/L (ref 135–145)

## 2023-03-28 RX ADMIN — SODIUM CHLORIDE SCH MLS/HR: 9 INJECTION, SOLUTION INTRAVENOUS at 09:02

## 2023-03-28 RX ADMIN — DEXTROSE AND SODIUM CHLORIDE SCH MLS/HR: 5; 900 INJECTION, SOLUTION INTRAVENOUS at 19:10

## 2023-03-28 RX ADMIN — DEXTROSE AND SODIUM CHLORIDE SCH MLS/HR: 5; 900 INJECTION, SOLUTION INTRAVENOUS at 04:19

## 2023-03-28 RX ADMIN — SODIUM CHLORIDE, PRESERVATIVE FREE SCH: 5 INJECTION INTRAVENOUS at 09:03

## 2023-03-28 RX ADMIN — SODIUM CHLORIDE, PRESERVATIVE FREE SCH: 5 INJECTION INTRAVENOUS at 17:23

## 2023-03-28 RX ADMIN — ENOXAPARIN SODIUM SCH MG: 100 INJECTION SUBCUTANEOUS at 09:03

## 2023-03-28 RX ADMIN — SODIUM CHLORIDE, PRESERVATIVE FREE SCH: 5 INJECTION INTRAVENOUS at 04:19

## 2023-03-28 RX ADMIN — Medication SCH TAB: at 17:20

## 2023-03-28 RX ADMIN — SODIUM CHLORIDE SCH MLS/HR: 900 INJECTION INTRAVENOUS at 09:03

## 2023-03-28 NOTE — PROVIDER PROGRESS NOTE
Subjective





- Prog Note Date


Prog Note Date: 03/28/23


Prog Note Time: 17:22





- Subjective


Subjective: 





She is more awake today.  Nursing has been able to coax her into eating some of 

her food.  But she still very sleepy, falls asleep very easily.  She is sitting 

up in bed about 45 degrees, asleep but her eyes are half open with coarse, heavy

breathing.





Current Medications





- Current Medications


Current Medications: 





Active Medications





Enoxaparin Sodium (Enoxaparin 30 Mg/0.3 Ml Syringe)  30 mg SUBQ DAILY [facility]


   Last Admin: 03/28/23 09:03 Dose:  30 mg


   


Dextrose/Sodium Chloride (D5ns)  1,000 mls @ 100 mls/hr IV .Q10H [facility]


   Last Infusion: 03/28/23 15:11 Dose:  100 mls/hr


   


Acetaminophen (Acetaminophen)  1,000 mg in 100 mls @ 400 mls/hr IV Q6HR PRN


   PRN Reason: Pain or Fever > 38C (100.4F)


Ceftriaxone Sodium 2 gm/ (Sodium Chloride)  100 mls @ 200 mls/hr IV DAILY [facility]


   Last Infusion: 03/28/23 09:35 Dose:  Infused


   


Azithromycin 500 mg/ Sodium (Chloride)  250 mls @ 250 mls/hr IV DAILY [facility]


   Stop: 03/29/23 00:01


   Last Infusion: 03/28/23 10:05 Dose:  Infused


   


Lidocaine (Lidocaine Patch 5%)  1 patch TOP DAILY PRN


   PRN Reason: PAIN


Multi-Ingredient Ointment (Zinc Oxide 20% Oint 30 Gm Tube)  1 applic TOP PRN PRN


   PRN Reason: Skin Care


   Last Admin: 03/27/23 08:37 Dose:  1 applic


   


Multivitamins/Minerals (Multivitamin W/Minerals Tablet)  1 tab PO DAILYWM [facility]


   Last Admin: 03/28/23 17:20 Dose:  1 tab


   


Sodium Chloride (Sodium Chloride Flush 0.9% 10 Ml Syringe)  10 ml IVP PRN PRN


   PRN Reason: AS NEEDED PER PROVIDER ORDERS


Sodium Chloride (Sodium Chloride Flush 0.9% 10 Ml Syringe)  10 ml IVP 

0100,0900,1700 [facility]


   Last Admin: 03/28/23 17:23 Dose:  Not Given


   





                                        





Carbidopa/Levodopa [Carbidopa-Levo ER  Tab] 1 tab PO BID 04/30/17 


Ipratropium Bromide 2 sprays CANDE BID 04/30/17 


Mirtazapine 7.5 mg PO QPM 04/30/17 


Pramipexole [Mirapex] 0.25 mg PO QPM 04/30/17 


Acetaminophen [Tylenol] 650 mg PO Q4H PRN 01/11/23 


Aspirin [Aspirin EC] 81 mg PO DAILY 01/11/23 


Carbidopa/Levodopa 25/100 [Sinemet 25 mg/100 mg] 1 tab PO 5XD 01/11/23 


D-Mannose [Azo D-Mannose] 500 mg PO DAILY 01/11/23 


Estrogens, Conjugated Cream [Premarin Cream] 0.5 g VG MOFR 01/11/23 


Lidocaine Patch 5% [Lidoderm Patch] 1 patch TOP DAILY PRN 01/11/23 


Metoprolol Succinate [Toprol Xl] 12.5 mg PO QPM 01/11/23 


Sennosides [Senna] 17.2 mg PO DAILY 01/11/23 


polyethylene glycoL 3350 [Miralax] 17 g PO DAILY 01/11/23 


Cholecalciferol (Vitamin D3) [Vitamin D3] 1,250 mcg PO Q30D 03/26/23 











Objective





- Vital Signs/Intake & Output


Reviewed Vital Signs: Yes


Vital Signs: 


                                Vital Signs x48h











  Temp Pulse Resp BP Pulse Ox


 


 03/28/23 15:48  36.5 C  84  24  140/90 H  97


 


 03/28/23 13:00  36.3 C L  90  22  122/82 H  99











Intake & Output: 


                                 Intake & Output











 03/25/23 03/26/23 03/27/23 03/28/23





 23:59 23:59 23:59 23:59


 


Intake Total  4087.5 2875.000 2691.667


 


Output Total  75 800 775


 


Balance  4012.5 2075.000 1916.667














- Objective


General Appearance: positive: Lethargic, Other (Cachectic elderly female, very 

frail looking.  Cheeks are bright red as she sits up in bed with eyes half open,

requiring quite a bit of voice commands and touching her shoulder to wake up)


Eyes Bilateral: positive: PERRL


ENT: positive: Dry mucous membranes


Neck: positive: No JVD.  negative: Stiff neck


Respiratory: positive: Other (Coarse, sonorous upper airway sounds throughout 

all lung fields. She appears slightly tachypneic.)


Cardiovascular: positive: Regular rate & rhythm, Systolic murmur


Abdomen: positive: Non-tender, No organomegaly, Nml bowel sounds, No distention


Skin: positive: Diaphoresis, Pallor


Extremities: positive: Full ROM, Pedal edema


Neurologic/Psychiatric: positive: Other (Response to voice, withdraws to painful

stimuli, does move all extremities to noxious stimuli but no purposeful 

movement.)





- Lab Results


Fish Bones: 


                                 03/27/23 05:42





                                 03/28/23 09:26


Other Labs: 


                               Lab Results x24hrs











  03/28/23 Range/Units





  09:26 


 


Sodium  145  (135-145)  mmol/L


 


Potassium  3.3 L  (3.5-5.0)  mmol/L


 


Chloride  122 H*  (101-111)  mmol/L


 


Carbon Dioxide  21  (21-32)  mmol/L


 


Anion Gap  2.0 L  (6-13)  


 


BUN  30 H  (6-20)  mg/dL


 


Creatinine  0.5  (0.4-1.0)  mg/dL


 


Estimated GFR (MDRD)  116  (>89)  


 


Glucose  136 H  ()  mg/dL


 


Calcium  8.2 L  (8.5-10.3)  mg/dL














ABX Reporting


Has patient been on IV antibiotics over the past 48 hours?: Yes





Sepsis Event Note (H)





- Evaluation


Current Stage of Sepsis: Resolved


Possible source of Sepsis: positive: Pulmonary, Genitourinary





- Sepsis Criteria


Sepsis Criteria: Respiratory: Increasing oxygen requirements, CNS: altered 

consciousness (unrelated to primary neuro pathology), MAP less than 65 mmHg, SBP

less than 90 mmHg, Metabolic: lactate > 2 mmol/L





Assessment/Plan





- Problem List


(1) Sepsis


Impression: 


 with shock.





The patient presented with a blood pressure of 69 systolic, tachycardic, very 

high white count of 30, elevated lactic acid and the source appears to be either

COVID or healthcare associated pneumonia or UTI. Since receiving antibiotics, 

she has defervesced.  Her last temperature was on March 26 and she has been 

afebrile since then.  There has been no tachycardia.  And blood pressure has 

come up today to 140/90 and 140/85 after being in the 120s for the last couple 

of days.  She is nonhypoxic and is 97 to 99% on room air. Preliminary blood 

cultures have gram-negative bacilli.  PCR has identified it is Proteus.  

Sensitivities are pending. Antibiotics have been ordered to treat both pneumonia

and UTI.  As such she is on ceftriaxone day #3, and azithromycin day #3. While 

she has responded to treatment with normalization of her vital signs, and 

obtundation has improved to the point that she can at least respond to swallow 

food, this patient is still quite ill.  Still breathing hard, and very cach

ectic.  Overall prognosis for her is poor.


Plan:


She has been on 100 cc an hour, and I will DC IV fluids


Because the patient's POLST indicated she wants comfort directed care, we will 

not plan to use pressors or put her in the ICU


Today completes the azithromycin part of pneumonia treatment.  I will continue 

ceftriaxone.  Await the sensitivities of the blood cultures to adjust 

antibiotics





(2) LAINE (acute kidney injury) resolved. New hypokalemia


Conclusion/Plan: 


This patient's baseline creatinine is 0.4 (records and labs were reviewed). 

Presented on admission with BUN/creatinine of 44/2.3, consistent with marked 

volume depletion with this septic shock


                                Laboratory Tests











  03/26/23 03/27/23 03/28/23





  09:11 05:42 09:26


 


Creatinine  2.3 H  1.2 H  0.5








She has responded well to fluid resuscitation antibiotics.  Her creatinine is 

now back to baseline.  I can infer that she is improved from that regard, but 

this unfortunate female is still quite lethargic, and a poor prognosis


Plan:


stop IVF and encourage po intake. If she can't drink enough I will resume NS at 

83 cc/hr tomorrow


Avoid nephrotoxins


Follow BMP daily.


Potassium rider to supplement potassium and recheck tomorrow





(3) Obtundation improved. 





more awake and able to eat some food. responds to voice and commands but slowly


Conclusion/Plan: 


Patient is underlying dementia and Parkinson's.  She presented in obtunded 

condition 2 months ago also when she had sepsis


Plan:


continue abx until at baseline. 





(4) COVID


Conclusion/Plan: 


According to the ED providers discussion with the daughter by phone, this 

patient tested positive for COVID 1 week ago.


The patient has some hypoxia, patchy infiltrates on chest x-ray


Plan:


IV Decadron Day #3, plan for 10 days


The patient is not a candidate to start remdesivir given her onset being a week 

ago or more





(5) Hypoxia resolved


Conclusion/Plan: 


This is likely from her pneumonia. Even though she was saturating at 97 to 99%, 

she was still getting nasal cannula on her first day here.  Starting on the 27th

we took off her oxygen and she has been 99 to 100% on room air.


Plan:


We will provide supplemental oxygen, saturation target will be 90% or above





(6) UTI (urinary tract infection)


Conclusion/Plan: 


Patient gets frequent UTIs.  The UA now showed a lot of squamous cells therefore

no culture was indicated.


Patient has already been started on empiric ceftriaxone which would treat a UTI 

as well and is treating this pneumonia.  Proteus is growing in her blood.


Plan:


No repeat UA


Continue empiric iv ceftriaxone


Qualifiers: 


   Urinary tract infection type: site unspecified   Hematuria presence: without 

hematuria   Qualified Code(s): N39.0 - Urinary tract infection, site not 

specified   





(7) Parkinson's disease dementia


Conclusion/Plan: 


The daughter had told this that even at her best, the patient has 

hallucinations. I will resume her meds since she  is swallowing now.





mirtazapine 7.5 p.o. every afternoon.  She is on 2 forms of carbidopa levodopa. 

Extended release 25/100 twice daily.  And carbidopa levodopa 25/100 at 6 in the 

morning, 10 AM, 1400, 1800, and 2200.








(8)Severe protein calorie malnutrition





In April 2017 she was 74 kg.  June 2017 she was 69 kg.  November 2021 she was 54

kg.  January 2023 she was 45 kg.  She is 44 kg on admission.


My suspicion is that she has dysphagia due to her Parkinson's.  Lack of 

coordination with swallowing mechanism.  This is very common with motor neuron 

disease.  Focus is on comfort measures so she is not a candidate for tube feeds 

or PEG tube.


Qualifiers: 


   Qualified Code(s): A41.9 - Sepsis, unspecified organism; R65.21 - Severe 

sepsis with septic shock; N17.9 - Acute kidney failure, unspecified

## 2023-03-29 LAB
ANION GAP SERPL CALCULATED.4IONS-SCNC: 2 MMOL/L (ref 6–13)
BASOPHILS NFR BLD AUTO: 0 10^3/UL (ref 0–0.1)
BASOPHILS NFR BLD AUTO: 0.2 %
BUN SERPL-MCNC: 20 MG/DL (ref 6–20)
CALCIUM UR-MCNC: 8.3 MG/DL (ref 8.5–10.3)
CHLORIDE SERPL-SCNC: 127 MMOL/L (ref 101–111)
CO2 SERPL-SCNC: 25 MMOL/L (ref 21–32)
CREAT SERPLBLD-SCNC: 0.4 MG/DL (ref 0.4–1)
EOSINOPHIL # BLD AUTO: 0.1 10^3/UL (ref 0–0.7)
EOSINOPHIL NFR BLD AUTO: 0.7 %
ERYTHROCYTE [DISTWIDTH] IN BLOOD BY AUTOMATED COUNT: 15 % (ref 12–15)
GFRSERPLBLD MDRD-ARVRAT: 150 ML/MIN/{1.73_M2} (ref 89–?)
GLUCOSE SERPL-MCNC: 112 MG/DL (ref 70–100)
HCT VFR BLD AUTO: 36.7 % (ref 37–47)
HGB UR QL STRIP: 11.1 G/DL (ref 12–16)
LYMPHOCYTES # SPEC AUTO: 0.9 10^3/UL (ref 1.5–3.5)
LYMPHOCYTES NFR BLD AUTO: 11.1 %
MCH RBC QN AUTO: 26.7 PG (ref 27–31)
MCHC RBC AUTO-ENTMCNC: 30.2 G/DL (ref 32–36)
MCV RBC AUTO: 88.2 FL (ref 81–99)
MONOCYTES # BLD AUTO: 0.3 10^3/UL (ref 0–1)
MONOCYTES NFR BLD AUTO: 3.5 %
NEUTROPHILS # BLD AUTO: 6.9 10^3/UL (ref 1.5–6.6)
NEUTROPHILS # SNV AUTO: 8.3 X10^3/UL (ref 4.8–10.8)
NEUTROPHILS NFR BLD AUTO: 83.8 %
NRBC # BLD AUTO: 0 /100WBC
NRBC # BLD AUTO: 0 X10^3/UL
PDW BLD AUTO: 11.4 FL (ref 7.9–10.8)
PLATELET # BLD: 114 10^3/UL (ref 130–450)
POTASSIUM SERPL-SCNC: 3.4 MMOL/L (ref 3.5–5)
RBC MAR: 4.16 10^6/UL (ref 4.2–5.4)
SODIUM SERPLBLD-SCNC: 154 MMOL/L (ref 135–145)

## 2023-03-29 RX ADMIN — DEXTROSE AND SODIUM CHLORIDE SCH MLS/HR: 5; 900 INJECTION, SOLUTION INTRAVENOUS at 15:21

## 2023-03-29 RX ADMIN — CARBIDOPA AND LEVODOPA SCH TAB: 25; 100 TABLET ORAL at 19:06

## 2023-03-29 RX ADMIN — Medication SCH TAB: at 09:03

## 2023-03-29 RX ADMIN — METOPROLOL SUCCINATE SCH MG: 25 TABLET, EXTENDED RELEASE ORAL at 22:20

## 2023-03-29 RX ADMIN — SODIUM CHLORIDE, PRESERVATIVE FREE SCH ML: 5 INJECTION INTRAVENOUS at 00:45

## 2023-03-29 RX ADMIN — SODIUM CHLORIDE SCH MLS/HR: 900 INJECTION INTRAVENOUS at 09:02

## 2023-03-29 RX ADMIN — POTASSIUM CHLORIDE SCH MLS/HR: 7.46 INJECTION, SOLUTION INTRAVENOUS at 19:06

## 2023-03-29 RX ADMIN — POTASSIUM CHLORIDE SCH MLS/HR: 7.46 INJECTION, SOLUTION INTRAVENOUS at 20:31

## 2023-03-29 RX ADMIN — ENOXAPARIN SODIUM SCH MG: 100 INJECTION SUBCUTANEOUS at 09:03

## 2023-03-29 RX ADMIN — SODIUM CHLORIDE, PRESERVATIVE FREE SCH: 5 INJECTION INTRAVENOUS at 09:04

## 2023-03-29 RX ADMIN — DEXTROSE AND SODIUM CHLORIDE SCH MLS/HR: 5; 900 INJECTION, SOLUTION INTRAVENOUS at 04:59

## 2023-03-29 RX ADMIN — POTASSIUM CHLORIDE SCH MLS/HR: 7.46 INJECTION, SOLUTION INTRAVENOUS at 21:44

## 2023-03-29 RX ADMIN — CARBIDOPA AND LEVODOPA SCH TAB: 25; 100 TABLET, EXTENDED RELEASE ORAL at 22:19

## 2023-03-29 RX ADMIN — DEXTROSE MONOHYDRATE SCH MLS/HR: 50 INJECTION, SOLUTION INTRAVENOUS at 19:33

## 2023-03-29 RX ADMIN — PRAMIPEXOLE DIHYDROCHLORIDE SCH MG: 0.25 TABLET ORAL at 22:19

## 2023-03-29 RX ADMIN — POTASSIUM CHLORIDE SCH MLS/HR: 7.46 INJECTION, SOLUTION INTRAVENOUS at 22:47

## 2023-03-29 RX ADMIN — SODIUM CHLORIDE, PRESERVATIVE FREE SCH: 5 INJECTION INTRAVENOUS at 16:54

## 2023-03-29 RX ADMIN — CARBIDOPA AND LEVODOPA SCH TAB: 25; 100 TABLET ORAL at 22:20

## 2023-03-29 RX ADMIN — MIRTAZAPINE SCH MG: 15 TABLET, FILM COATED ORAL at 22:20

## 2023-03-30 LAB
ANION GAP SERPL CALCULATED.4IONS-SCNC: 4 MMOL/L (ref 6–13)
BASOPHILS NFR BLD AUTO: 0 10^3/UL (ref 0–0.1)
BASOPHILS NFR BLD AUTO: 0.3 %
BUN SERPL-MCNC: 12 MG/DL (ref 6–20)
CALCIUM UR-MCNC: 7.8 MG/DL (ref 8.5–10.3)
CHLORIDE SERPL-SCNC: 117 MMOL/L (ref 101–111)
CO2 SERPL-SCNC: 26 MMOL/L (ref 21–32)
CREAT SERPLBLD-SCNC: 0.4 MG/DL (ref 0.4–1)
EOSINOPHIL # BLD AUTO: 0.1 10^3/UL (ref 0–0.7)
EOSINOPHIL NFR BLD AUTO: 1.9 %
ERYTHROCYTE [DISTWIDTH] IN BLOOD BY AUTOMATED COUNT: 14.8 % (ref 12–15)
GFRSERPLBLD MDRD-ARVRAT: 150 ML/MIN/{1.73_M2} (ref 89–?)
GLUCOSE SERPL-MCNC: 101 MG/DL (ref 70–100)
HCT VFR BLD AUTO: 34.5 % (ref 37–47)
HGB UR QL STRIP: 10.5 G/DL (ref 12–16)
LYMPHOCYTES # SPEC AUTO: 1.2 10^3/UL (ref 1.5–3.5)
LYMPHOCYTES NFR BLD AUTO: 20.3 %
MCH RBC QN AUTO: 26.6 PG (ref 27–31)
MCHC RBC AUTO-ENTMCNC: 30.4 G/DL (ref 32–36)
MCV RBC AUTO: 87.3 FL (ref 81–99)
MONOCYTES # BLD AUTO: 0.5 10^3/UL (ref 0–1)
MONOCYTES NFR BLD AUTO: 7.9 %
NEUTROPHILS # BLD AUTO: 3.9 10^3/UL (ref 1.5–6.6)
NEUTROPHILS # SNV AUTO: 5.7 X10^3/UL (ref 4.8–10.8)
NEUTROPHILS NFR BLD AUTO: 67.9 %
NRBC # BLD AUTO: 0 /100WBC
NRBC # BLD AUTO: 0 X10^3/UL
PDW BLD AUTO: 11.2 FL (ref 7.9–10.8)
PLATELET # BLD: 119 10^3/UL (ref 130–450)
POTASSIUM SERPL-SCNC: 3.2 MMOL/L (ref 3.5–5)
RBC MAR: 3.95 10^6/UL (ref 4.2–5.4)
SODIUM SERPLBLD-SCNC: 147 MMOL/L (ref 135–145)

## 2023-03-30 RX ADMIN — CARBIDOPA AND LEVODOPA SCH TAB: 25; 100 TABLET ORAL at 15:05

## 2023-03-30 RX ADMIN — CARBIDOPA AND LEVODOPA SCH TAB: 25; 100 TABLET ORAL at 10:16

## 2023-03-30 RX ADMIN — CARBIDOPA AND LEVODOPA SCH TAB: 25; 100 TABLET ORAL at 05:54

## 2023-03-30 RX ADMIN — CARBIDOPA AND LEVODOPA SCH TAB: 25; 100 TABLET ORAL at 18:45

## 2023-03-30 RX ADMIN — POTASSIUM CHLORIDE SCH MLS/HR: 7.46 INJECTION, SOLUTION INTRAVENOUS at 09:20

## 2023-03-30 RX ADMIN — DEXTROSE MONOHYDRATE SCH MLS/HR: 50 INJECTION, SOLUTION INTRAVENOUS at 21:46

## 2023-03-30 RX ADMIN — DEXTROSE MONOHYDRATE SCH MLS/HR: 50 INJECTION, SOLUTION INTRAVENOUS at 05:53

## 2023-03-30 RX ADMIN — ASPIRIN SCH MG: 81 TABLET, COATED ORAL at 08:31

## 2023-03-30 RX ADMIN — SODIUM CHLORIDE, PRESERVATIVE FREE SCH ML: 5 INJECTION INTRAVENOUS at 00:10

## 2023-03-30 RX ADMIN — Medication SCH TAB: at 08:31

## 2023-03-30 RX ADMIN — POTASSIUM CHLORIDE SCH MLS/HR: 7.46 INJECTION, SOLUTION INTRAVENOUS at 11:42

## 2023-03-30 RX ADMIN — METOPROLOL SUCCINATE SCH MG: 25 TABLET, EXTENDED RELEASE ORAL at 22:48

## 2023-03-30 RX ADMIN — SODIUM CHLORIDE SCH MLS/HR: 900 INJECTION INTRAVENOUS at 08:31

## 2023-03-30 RX ADMIN — MIRTAZAPINE SCH MG: 15 TABLET, FILM COATED ORAL at 22:48

## 2023-03-30 RX ADMIN — SODIUM CHLORIDE, PRESERVATIVE FREE SCH ML: 5 INJECTION INTRAVENOUS at 18:45

## 2023-03-30 RX ADMIN — DILTIAZEM HYDROCHLORIDE SCH: 120 CAPSULE, COATED, EXTENDED RELEASE ORAL at 08:32

## 2023-03-30 RX ADMIN — CARBIDOPA AND LEVODOPA SCH TAB: 25; 100 TABLET, EXTENDED RELEASE ORAL at 22:48

## 2023-03-30 RX ADMIN — ENOXAPARIN SODIUM SCH MG: 100 INJECTION SUBCUTANEOUS at 08:31

## 2023-03-30 RX ADMIN — CARBIDOPA AND LEVODOPA SCH TAB: 25; 100 TABLET ORAL at 22:48

## 2023-03-30 RX ADMIN — POTASSIUM CHLORIDE SCH MLS/HR: 7.46 INJECTION, SOLUTION INTRAVENOUS at 12:56

## 2023-03-30 RX ADMIN — CARBIDOPA AND LEVODOPA SCH TAB: 25; 100 TABLET, EXTENDED RELEASE ORAL at 08:31

## 2023-03-30 RX ADMIN — PRAMIPEXOLE DIHYDROCHLORIDE SCH MG: 0.25 TABLET ORAL at 22:48

## 2023-03-30 RX ADMIN — SODIUM CHLORIDE, PRESERVATIVE FREE SCH ML: 5 INJECTION INTRAVENOUS at 08:32

## 2023-03-30 RX ADMIN — POTASSIUM CHLORIDE SCH MLS/HR: 7.46 INJECTION, SOLUTION INTRAVENOUS at 10:17

## 2023-03-30 NOTE — PROVIDER PROGRESS NOTE
Subjective





- Prog Note Date


Prog Note Date: 03/30/23


Prog Note Time: 17:47





- Subjective


Subjective: 


In speaking to social work today, who spent quite a bit of time speaking to the 

daughter, social work describes a baseline patient that is very similar to what 

we are seeing now.  She is essentially mute, cachectic, bedbound.  Minimally 

interactive with the people or her environment around her. Believe dependent on 

someone for toileting, bathing, and feeding her.  The patient is hemodynamically

stable, and relies on CNA's and nurses for complete care.She does open up her 

eyes and will look at me but is unresponsive.  Nursing reports that at the begin

stephy of this admission, advance care planning was discussed with the daughter, 

but the daughter's response was "you are asking me to assassinate my mother".














Current Medications





- Current Medications


Current Medications: 





Active Medications





Aspirin (Aspirin Ec 81 Mg Tablet)  81 mg PO DAILY [facility]


   Last Admin: 03/30/23 08:31 Dose:  81 mg


   


Carbidopa/Levodopa (Carbidopa/Levodopa Er 25 Mg/100 Mg Tablet)  1 tab PO BID [facility]


   Last Admin: 03/30/23 08:31 Dose:  1 tab


   


Carbidopa/Levodopa (Carbidopa/Levodopa 25 Mg/100 Mg Tablet)  1 tab PO 5XD [facility]


   Last Admin: 03/30/23 15:05 Dose:  1 tab


   


Cholecalciferol (Cholecalciferol 5,000 Unit Capsule)  50,000 unit PO Q30D [facility]


Enoxaparin Sodium (Enoxaparin 30 Mg/0.3 Ml Syringe)  30 mg SUBQ DAILY [facility]


   Last Admin: 03/30/23 08:31 Dose:  30 mg


   


Acetaminophen (Acetaminophen)  1,000 mg in 100 mls @ 400 mls/hr IV Q6HR PRN


   PRN Reason: Pain or Fever > 38C (100.4F)


Ceftriaxone Sodium 2 gm/ (Sodium Chloride)  100 mls @ 200 mls/hr IV DAILY [facility]


   Last Infusion: 03/30/23 09:20 Dose:  Infused


   


Dextrose (D5w)  1,000 mls @ 100 mls/hr IV .Q10H [facility]


   Last Admin: 03/30/23 05:53 Dose:  100 mls/hr


   


Lidocaine (Lidocaine Patch 5%)  1 patch TOP DAILY PRN


   PRN Reason: PAIN


Metoprolol Succinate (Metoprolol Succinate 25 Mg Tablet)  12.5 mg PO QPM [facility]


   Last Admin: 03/29/23 22:20 Dose:  12.5 mg


   


Mirtazapine (Mirtazapine 15 Mg Tablet)  7.5 mg PO QPM [facility]


   Last Admin: 03/29/23 22:20 Dose:  7.5 mg


   


Multi-Ingredient Ointment (Zinc Oxide 20% Oint 30 Gm Tube)  1 applic TOP PRN PRN


   PRN Reason: Skin Care


   Last Admin: 03/27/23 08:37 Dose:  1 applic


   


Multivitamins/Minerals (Multivitamin W/Minerals Tablet)  1 tab PO DAILYWM [facility]


   Last Admin: 03/30/23 08:31 Dose:  1 tab


   


**D-Mannose [Azo D- Mannose] 500 Mg Capsule**  1 each PO DAILY [facility]


   Last Admin: 03/30/23 08:32 Dose:  Not Given


   


Pramipexole Dihydrochloride (Pramipexole 0.25 Mg Tablet)  0.25 mg PO QPM [facility]


   Last Admin: 03/29/23 22:19 Dose:  0.25 mg


   


Sodium Chloride (Sodium Chloride Flush 0.9% 10 Ml Syringe)  10 ml IVP PRN PRN


   PRN Reason: AS NEEDED PER PROVIDER ORDERS


Sodium Chloride (Sodium Chloride Flush 0.9% 10 Ml Syringe)  10 ml IVP 

0100,0900,1700 [facility]


   Last Admin: 03/30/23 08:32 Dose:  10 ml


   





                                        





Carbidopa/Levodopa [Carbidopa-Levo ER  Tab] 1 tab PO BID 04/30/17 


Ipratropium Bromide 2 sprays CANDE BID 04/30/17 


Mirtazapine 7.5 mg PO QPM 04/30/17 


Pramipexole [Mirapex] 0.25 mg PO QPM 04/30/17 


Acetaminophen [Tylenol] 650 mg PO Q4H PRN 01/11/23 


Aspirin [Aspirin EC] 81 mg PO DAILY 01/11/23 


Carbidopa/Levodopa 25/100 [Sinemet 25 mg/100 mg] 1 tab PO 5XD 01/11/23 


D-Mannose [Azo D-Mannose] 500 mg PO DAILY 01/11/23 


Estrogens, Conjugated Cream [Premarin Cream] 0.5 g VG MOFR 01/11/23 


Lidocaine Patch 5% [Lidoderm Patch] 1 patch TOP DAILY PRN 01/11/23 


Metoprolol Succinate [Toprol Xl] 12.5 mg PO QPM 01/11/23 


Sennosides [Senna] 17.2 mg PO DAILY 01/11/23 


polyethylene glycoL 3350 [Miralax] 17 g PO DAILY 01/11/23 


Cholecalciferol (Vitamin D3) [Vitamin D3] 1,250 mcg PO Q30D 03/26/23 











Objective





- Vital Signs/Intake & Output


Reviewed Vital Signs: Yes


Vital Signs: 


                                Vital Signs x48h











  Temp Pulse Resp BP Pulse Ox


 


 03/30/23 15:58  36.8 C  77  24  131/85 H  97


 


 03/30/23 13:00  36.7 C  80  20  134/97 H  97











Intake & Output: 


                                 Intake & Output











 03/27/23 03/28/23 03/29/23 03/30/23





 23:59 23:59 23:59 23:59


 


Intake Total 2875.000 3290.000 3026.667 1665


 


Output Total 800 1000 2050 1925


 


Balance 2075.000 2290.000 976.667 -260














- Objective


General Appearance: positive: Other (Eyes half open, not really responsive to my

voice)


Eyes Bilateral: positive: PERRL, EOMI


ENT: positive: Dry mucous membranes (In spite of adequate hydration and p.o. 

intake that she is being fed by the nurse)


Neck: positive: No JVD.  negative: Lymphadenopathy (R), Lymphadenopathy (L), 

Stiff neck


Respiratory: positive: No respiratory distress.  negative: Breath sounds nml 

(Every day she has had coarse upper airway sounds.), Wheezes, Rales, Rhonchi


Cardiovascular: positive: Regular rate & rhythm


Abdomen: positive: Non-tender, No organomegaly, Nml bowel sounds, No distention


Skin: positive: Warm, Dry


Extremities: positive: Pedal edema (Hands and feet.)


Neurologic/Psychiatric: positive: Other (Minimally responsive to voice.  Will 

answer but her responses are slow and audible I can barely hear her when she 

speaks.  I do not understand anything she is saying.).  negative: Motor nml 

(Severe diffuse generalized weakness, completely dependent on nurses to feed 

her, bathe her, dress)





- Lab Results


Fish Bones: 


                                 03/30/23 05:53





                                 03/30/23 05:53


Other Labs: 


                               Lab Results x24hrs











  03/30/23 03/30/23 Range/Units





  05:53 05:53 


 


WBC  5.7   (4.8-10.8)  x10^3/uL


 


RBC  3.95 L   (4.20-5.40)  10^6/uL


 


Hgb  10.5 L   (12.0-16.0)  g/dL


 


Hct  34.5 L   (37.0-47.0)  %


 


MCV  87.3   (81.0-99.0)  fL


 


MCH  26.6 L   (27.0-31.0)  pg


 


MCHC  30.4 L   (32.0-36.0)  g/dL


 


RDW  14.8   (12.0-15.0)  %


 


Plt Count  119 L   (130-450)  10^3/uL


 


MPV  11.2 H   (7.9-10.8)  fL


 


Neut # (Auto)  3.9   (1.5-6.6)  10^3/uL


 


Lymph # (Auto)  1.2 L   (1.5-3.5)  10^3/uL


 


Mono # (Auto)  0.5   (0.0-1.0)  10^3/uL


 


Eos # (Auto)  0.1   (0.0-0.7)  10^3/uL


 


Baso # (Auto)  0.0   (0.0-0.1)  10^3/uL


 


Absolute Nucleated RBC  0.00   x10^3/uL


 


Nucleated RBC %  0.0   /100WBC


 


Sodium   147 H  (135-145)  mmol/L


 


Potassium   3.2 L  (3.5-5.0)  mmol/L


 


Chloride   117 H  (101-111)  mmol/L


 


Carbon Dioxide   26  (21-32)  mmol/L


 


Anion Gap   4.0 L  (6-13)  


 


BUN   12  (6-20)  mg/dL


 


Creatinine   0.4  (0.4-1.0)  mg/dL


 


Estimated GFR (MDRD)   150  (>89)  


 


Glucose   101 H  ()  mg/dL


 


Calcium   7.8 L  (8.5-10.3)  mg/dL














ABX Reporting


Has patient been on IV antibiotics over the past 48 hours?: Yes





Sepsis Event Note (H)





- Evaluation


Current Stage of Sepsis: Septic shock


Possible source of Sepsis: positive: Pulmonary, Genitourinary





- Sepsis Criteria


Sepsis Criteria: Respiratory: Increasing oxygen requirements, CNS: altered 

consciousness (unrelated to primary neuro pathology), MAP less than 65 mmHg, SBP

less than 90 mmHg, Metabolic: lactate > 2 mmol/L





Assessment/Plan





- Problem List


(1) Sepsis


Impression: 


 with shock. Resolved. 





The patient presented with a blood pressure of 69 systolic, tachycardic, very 

high white count of 30, elevated lactic acid and the source appears to be either

COVID or healthcare associated pneumonia or UTI. Since receiving antibiotics, 

she has defervesced.  Her last temperature was on March 26 and she has been 

afebrile since then.  There has been no tachycardia.  And blood pressure has 

come up today to 140/90 and 140/85 after being in the 120s for the last couple 

of days.  She is no longer hypoxic and is 97 to 99% on room air. Preliminary 

blood cultures have gram-negative bacilli.  PCR has identified it is Proteus and

today confirmed Blood culture is positive for Proteus.  It is resistant to 

ciprofloxacin, and intermediate sensitivity to levofloxacin.  Otherwise 

sensitive to ampicillin, Unasyn, cefepime, Ceftriaxone, Zosyn and Bactrim.  

Antibiotics have been ordered to treat both pneumonia and UTI.  As such she is 

on ceftriaxone day #6, and completed azithromycin 3 doses on 3/28.  While she 

has responded to treatment with normalization of her vital signs, and 

obtundation has improved to the point that she can at least respond to swallow 

food, she still has increased respiratory effort or sounds.  Those are slowly 

improving but she is very cachectic.  Overall prognosis for her is poor. She was

on D5 normal saline and I was going to stop her IV fluids.  But she is 

developing hyperchloremia, hypernatremia.I changed her to D5 yesterday evening. 

Sodium is gone from 154-147.  Chloride is getting better and is come down to 117

from 127.  So she is responding


Plan:


Continue D5 without normal saline.


Because the patient's POLST indicated she wants comfort directed care, we will n

ot plan to use pressors or put her in the ICU


I will continue ceftriaxone for 7-10 days while she is here.  





(2) LAINE (acute kidney injury) resolved. Hypokalemia. Hypernatremia. 


Conclusion/Plan: 


This patient's baseline creatinine is 0.4 (records and labs were reviewed). 

Presented on admission with BUN/creatinine of 44/2.3, consistent with marked 

volume depletion with this septic shock


                                Laboratory Tests











  03/26/23 03/27/23 03/28/23





  09:11 05:42 09:26


 


Creatinine  2.3 H  1.2 H  0.5





                                Laboratory Tests











  03/29/23





  08:00


 


Creatinine  0.4











Creatinine is 0.4 today as well.  She has responded well to fluid resuscitation 

antibiotics.  Her creatinine is now back to baseline.  I can infer that she is 

improved from that regard, but this unfortunate female is still quite lethargic,

and a poor prognosis. Potassium is still low today.  This is in spite of 

potassium riders several days in a row.  Today is 3.2.  We will give 40 meq 

potassium riders and recheck tomorrow.


Plan:


Continue D5 because of hypernatremia.


Potassium riders, 40 mill equivalents today and recheck tomorrow


Avoid nephrotoxins


Follow BMP daily.








(3) Obtundation improved. 





more awake and able to eat some food. responds to voice and commands but slowly.

She is at baseline from what I can tell. 


Conclusion/Plan: 


Patient is underlying dementia and Parkinson's.  She presented as obtunded 

condition 2 months ago also when she had sepsis


Plan:


continue abx





(4) COVID


Conclusion/Plan: 


According to the ED providers discussion with the daughter by phone, this 

patient tested positive for COVID 1 week ago.


The patient has some hypoxia, patchy infiltrates on chest x-ray


Plan:


IV Decadron Day #5, plan for 10 days


The patient is not a candidate to start remdesivir given her onset being a week 

ago or more





(5) Hypoxia resolved


Conclusion/Plan: 


This is likely from her pneumonia. Even though she was saturating at 97 to 99%, 

she was still getting nasal cannula on her first day here.  Starting on the 27th

we took off her oxygen and she has been 99 to 100% on room air.


Plan:


We will provide supplemental oxygen, saturation target will be 90% or above





(6) UTI (urinary tract infection)


Conclusion/Plan: 


Patient gets frequent UTIs.  The UA now showed a lot of squamous cells therefore

no culture was indicated.


Patient has already been started on empiric ceftriaxone which would treat a UTI 

as well and is treating this pneumonia.  Proteus is growing in her blood.


Plan:


No repeat UA


Continue empiric iv ceftriaxone


Qualifiers: 


   Urinary tract infection type: site unspecified   Hematuria presence: without 

hematuria   Qualified Code(s): N39.0 - Urinary tract infection, site not spec

ified   





(7) Parkinson's disease dementia


Conclusion/Plan: 


The daughter had told this that even at her best, the patient has 

hallucinations. I have her meds since she  is swallowing now.





mirtazapine 7.5 p.o. every afternoon.  She is on 2 forms of carbidopa levodopa. 

Extended release 25/100 twice daily.  And carbidopa levodopa 25/100 at 6 in the 

morning, 10 AM, 1400, 1800, and 2200.








(8)Severe protein calorie malnutrition





In April 2017 she was 74 kg.  June 2017 she was 69 kg.  November 2021 she was 54

kg.  January 2023 she was 45 kg.  She is 44 kg on admission.


My suspicion is that she has dysphagia due to her Parkinson's.  Lack of 

coordination with swallowing mechanism.  This is very common with motor neuron 

disease.  Focus is on comfort measures so she is not a candidate for tube feeds 

or PEG tube.








Qualifiers: 


   Qualified Code(s): A41.9 - Sepsis, unspecified organism; R65.21 - Severe seps

is with septic shock; N17.9 - Acute kidney failure, unspecified

## 2023-03-31 LAB
ANION GAP SERPL CALCULATED.4IONS-SCNC: 5 MMOL/L (ref 6–13)
BASOPHILS NFR BLD AUTO: 0 10^3/UL (ref 0–0.1)
BASOPHILS NFR BLD AUTO: 0.4 %
BUN SERPL-MCNC: 11 MG/DL (ref 6–20)
CALCIUM UR-MCNC: 7.8 MG/DL (ref 8.5–10.3)
CHLORIDE SERPL-SCNC: 111 MMOL/L (ref 101–111)
CO2 SERPL-SCNC: 26 MMOL/L (ref 21–32)
CREAT SERPLBLD-SCNC: 0.3 MG/DL (ref 0.4–1)
EOSINOPHIL # BLD AUTO: 0.3 10^3/UL (ref 0–0.7)
EOSINOPHIL NFR BLD AUTO: 6.2 %
ERYTHROCYTE [DISTWIDTH] IN BLOOD BY AUTOMATED COUNT: 14.6 % (ref 12–15)
GFRSERPLBLD MDRD-ARVRAT: 209 ML/MIN/{1.73_M2} (ref 89–?)
GLUCOSE SERPL-MCNC: 98 MG/DL (ref 70–100)
HCT VFR BLD AUTO: 34.1 % (ref 37–47)
HGB UR QL STRIP: 10.4 G/DL (ref 12–16)
LYMPHOCYTES # SPEC AUTO: 1.2 10^3/UL (ref 1.5–3.5)
LYMPHOCYTES NFR BLD AUTO: 24.8 %
MCH RBC QN AUTO: 26.6 PG (ref 27–31)
MCHC RBC AUTO-ENTMCNC: 30.5 G/DL (ref 32–36)
MCV RBC AUTO: 87.2 FL (ref 81–99)
MONOCYTES # BLD AUTO: 0.5 10^3/UL (ref 0–1)
MONOCYTES NFR BLD AUTO: 9.9 %
NEUTROPHILS # BLD AUTO: 2.6 10^3/UL (ref 1.5–6.6)
NEUTROPHILS # SNV AUTO: 4.7 X10^3/UL (ref 4.8–10.8)
NEUTROPHILS NFR BLD AUTO: 56.3 %
NRBC # BLD AUTO: 0 /100WBC
NRBC # BLD AUTO: 0 X10^3/UL
PDW BLD AUTO: 11 FL (ref 7.9–10.8)
PLATELET # BLD: 141 10^3/UL (ref 130–450)
POTASSIUM SERPL-SCNC: 3.4 MMOL/L (ref 3.5–5)
RBC MAR: 3.91 10^6/UL (ref 4.2–5.4)
SODIUM SERPLBLD-SCNC: 142 MMOL/L (ref 135–145)

## 2023-03-31 RX ADMIN — POTASSIUM CHLORIDE SCH MLS/HR: 7.46 INJECTION, SOLUTION INTRAVENOUS at 10:42

## 2023-03-31 RX ADMIN — ENOXAPARIN SODIUM SCH MG: 100 INJECTION SUBCUTANEOUS at 08:48

## 2023-03-31 RX ADMIN — POTASSIUM CHLORIDE SCH MLS/HR: 7.46 INJECTION, SOLUTION INTRAVENOUS at 11:47

## 2023-03-31 RX ADMIN — DEXTROSE MONOHYDRATE SCH MLS/HR: 50 INJECTION, SOLUTION INTRAVENOUS at 08:57

## 2023-03-31 RX ADMIN — ASPIRIN SCH MG: 81 TABLET, COATED ORAL at 08:48

## 2023-03-31 RX ADMIN — PRAMIPEXOLE DIHYDROCHLORIDE SCH MG: 0.25 TABLET ORAL at 21:16

## 2023-03-31 RX ADMIN — CARBIDOPA AND LEVODOPA SCH TAB: 25; 100 TABLET ORAL at 05:30

## 2023-03-31 RX ADMIN — SODIUM CHLORIDE, PRESERVATIVE FREE SCH ML: 5 INJECTION INTRAVENOUS at 08:50

## 2023-03-31 RX ADMIN — CARBIDOPA AND LEVODOPA SCH TAB: 25; 100 TABLET, EXTENDED RELEASE ORAL at 08:48

## 2023-03-31 RX ADMIN — METOPROLOL SUCCINATE SCH MG: 25 TABLET, EXTENDED RELEASE ORAL at 21:17

## 2023-03-31 RX ADMIN — DILTIAZEM HYDROCHLORIDE SCH: 120 CAPSULE, COATED, EXTENDED RELEASE ORAL at 08:49

## 2023-03-31 RX ADMIN — DEXTROSE MONOHYDRATE SCH: 50 INJECTION, SOLUTION INTRAVENOUS at 15:52

## 2023-03-31 RX ADMIN — SODIUM CHLORIDE, PRESERVATIVE FREE SCH ML: 5 INJECTION INTRAVENOUS at 17:20

## 2023-03-31 RX ADMIN — CARBIDOPA AND LEVODOPA SCH TAB: 25; 100 TABLET, EXTENDED RELEASE ORAL at 21:15

## 2023-03-31 RX ADMIN — CARBIDOPA AND LEVODOPA SCH TAB: 25; 100 TABLET ORAL at 10:42

## 2023-03-31 RX ADMIN — POTASSIUM CHLORIDE SCH MLS/HR: 7.46 INJECTION, SOLUTION INTRAVENOUS at 12:43

## 2023-03-31 RX ADMIN — SODIUM CHLORIDE, PRESERVATIVE FREE SCH: 5 INJECTION INTRAVENOUS at 04:21

## 2023-03-31 RX ADMIN — CARBIDOPA AND LEVODOPA SCH TAB: 25; 100 TABLET ORAL at 18:31

## 2023-03-31 RX ADMIN — Medication SCH TAB: at 08:48

## 2023-03-31 RX ADMIN — MIRTAZAPINE SCH MG: 15 TABLET, FILM COATED ORAL at 21:16

## 2023-03-31 RX ADMIN — POTASSIUM CHLORIDE SCH MLS/HR: 7.46 INJECTION, SOLUTION INTRAVENOUS at 09:38

## 2023-03-31 RX ADMIN — CARBIDOPA AND LEVODOPA SCH TAB: 25; 100 TABLET ORAL at 22:22

## 2023-03-31 RX ADMIN — CARBIDOPA AND LEVODOPA SCH TAB: 25; 100 TABLET ORAL at 15:48

## 2023-03-31 RX ADMIN — SODIUM CHLORIDE SCH MLS/HR: 900 INJECTION INTRAVENOUS at 08:48

## 2023-03-31 NOTE — PROVIDER PROGRESS NOTE
Subjective





- Prog Note Date


Prog Note Date: 03/31/23


Prog Note Time: 11:39





- Subjective


Subjective: 





Granddaughter was at the bedside this morning.  Feeding grandma.  Patient's 

daughter was on the phone for FaceTime.  All questions answered.  They explained

to me that her voice got so low because she was without her Parkinson's drugs 

for a while.  Swallowing and speech were really affected.  Now that she is on 

her Parkinson's medications she is getting a little bit better.





Current Medications





- Current Medications


Current Medications: 





Active Medications





Aspirin (Aspirin Ec 81 Mg Tablet)  81 mg PO DAILY [facility]


   Last Admin: 03/31/23 08:48 Dose:  81 mg


   


Carbidopa/Levodopa (Carbidopa/Levodopa Er 25 Mg/100 Mg Tablet)  1 tab PO BID [facility]


   Last Admin: 03/31/23 08:48 Dose:  1 tab


   


Carbidopa/Levodopa (Carbidopa/Levodopa 25 Mg/100 Mg Tablet)  1 tab PO 5XD [facility]


   Last Admin: 03/31/23 18:31 Dose:  1 tab


   


Cholecalciferol (Cholecalciferol 5,000 Unit Capsule)  50,000 unit PO Q30D [facility]


Enoxaparin Sodium (Enoxaparin 30 Mg/0.3 Ml Syringe)  30 mg SUBQ DAILY [facility]


   Last Admin: 03/31/23 08:48 Dose:  30 mg


   


Acetaminophen (Acetaminophen)  1,000 mg in 100 mls @ 400 mls/hr IV Q6HR PRN


   PRN Reason: Pain or Fever > 38C (100.4F)


   Last Infusion: 03/31/23 17:40 Dose:  Infused


   


Ceftriaxone Sodium 2 gm/ (Sodium Chloride)  100 mls @ 200 mls/hr IV DAILY [facility]


   Last Infusion: 03/31/23 09:38 Dose:  Infused


   


Lidocaine (Lidocaine Patch 5%)  1 patch TOP DAILY PRN


   PRN Reason: PAIN


Metoprolol Succinate (Metoprolol Succinate 25 Mg Tablet)  12.5 mg PO QPM [facility]


   Last Admin: 03/30/23 22:48 Dose:  12.5 mg


   


Mirtazapine (Mirtazapine 15 Mg Tablet)  7.5 mg PO QPM [facility]


   Last Admin: 03/30/23 22:48 Dose:  7.5 mg


   


Multi-Ingredient Ointment (Zinc Oxide 20% Oint 30 Gm Tube)  1 applic TOP PRN PRN


   PRN Reason: Skin Care


   Last Admin: 03/27/23 08:37 Dose:  1 applic


   


Multivitamins/Minerals (Multivitamin W/Minerals Tablet)  1 tab PO DAILYWM [facility]


   Last Admin: 03/31/23 08:48 Dose:  1 tab


   


**D-Mannose [Azo D- Mannose] 500 Mg Capsule**  1 each PO DAILY [facility]


   Last Admin: 03/31/23 08:49 Dose:  Not Given


   


Polyethylene Glycol (Polyethylene Glycol 3350 17 Gm Packet)  17 gm PO DAILY [facility]


   Last Admin: 03/31/23 10:49 Dose:  17 gm


   


Pramipexole Dihydrochloride (Pramipexole 0.25 Mg Tablet)  0.25 mg PO QPM [facility]


   Last Admin: 03/30/23 22:48 Dose:  0.25 mg


   


Sodium Chloride (Sodium Chloride Flush 0.9% 10 Ml Syringe)  10 ml IVP PRN PRN


   PRN Reason: AS NEEDED PER PROVIDER ORDERS


Sodium Chloride (Sodium Chloride Flush 0.9% 10 Ml Syringe)  10 ml IVP 

0100,0900,1700 [facility]


   Last Admin: 03/31/23 17:20 Dose:  10 ml


   





                                        





Carbidopa/Levodopa [Carbidopa-Levo ER  Tab] 1 tab PO BID 04/30/17 


Ipratropium Bromide 2 sprays CANDE BID 04/30/17 


Mirtazapine 7.5 mg PO QPM 04/30/17 


Pramipexole [Mirapex] 0.25 mg PO QPM 04/30/17 


Acetaminophen [Tylenol] 650 mg PO Q4H PRN 01/11/23 


Aspirin [Aspirin EC] 81 mg PO DAILY 01/11/23 


Carbidopa/Levodopa 25/100 [Sinemet 25 mg/100 mg] 1 tab PO 5XD 01/11/23 


D-Mannose [Azo D-Mannose] 500 mg PO DAILY 01/11/23 


Estrogens, Conjugated Cream [Premarin Cream] 0.5 g VG MOFR 01/11/23 


Lidocaine Patch 5% [Lidoderm Patch] 1 patch TOP DAILY PRN 01/11/23 


Metoprolol Succinate [Toprol Xl] 12.5 mg PO QPM 01/11/23 


Sennosides [Senna] 17.2 mg PO DAILY 01/11/23 


polyethylene glycoL 3350 [Miralax] 17 g PO DAILY 01/11/23 


Cholecalciferol (Vitamin D3) [Vitamin D3] 1,250 mcg PO Q30D 03/26/23 











Objective





- Vital Signs/Intake & Output


Reviewed Vital Signs: Yes


Vital Signs: 


                                Vital Signs x48h











  Temp Pulse Resp BP BP Pulse Ox


 


 03/31/23 07:46  36.4 C L  70  20   96/59 L  97


 


 03/31/23 05:00  36.6 C  72  18  120/74   96











Intake & Output: 


                                 Intake & Output











 03/28/23 03/29/23 03/30/23 03/31/23





 23:59 23:59 23:59 23:59


 


Intake Total 3290.000 3026.667 3985 1320


 


Output Total 1000 2050 2300 975


 


Balance 2290.000 081.588 5403 345














- Objective


General Appearance: positive: Alert (This is the most alert I have seen her.  

She is interacting with her granddaughter.  Able to tell her daughter that her 

legs hurt and she wants that "squeezing thing".  She is referring to SCDs.  She 

is cooperating with granddaughter to eat), Other (Very frail, thin, cachectic 

elderly female)


Eyes Bilateral: positive: PERRL, EOMI


ENT: positive: No signs of dehydration


Neck: positive: No JVD.  negative: Stiff neck


Respiratory: positive: No respiratory distress, Rhonchi.  negative: Wheezes, 

Rales


Cardiovascular: positive: Regular rate & rhythm, Systolic murmur


Abdomen: positive: Non-tender, No organomegaly, Nml bowel sounds, No distention


Skin: positive: Warm, Dry


Extremities: positive: Full ROM (On passive range of motion.  She really does 

not do spontaneous movement on her own)


Neurologic/Psychiatric: positive: Other (Oriented to person.  Recognizes her 

daughter.).  negative: CN's nml (2-12) (Voice slow and almost inaudible, 

expressionless face), Motor nml (Stiff bending at elbows and when I try and bend

to her at her hip)





- Lab Results


Fish Bones: 


                                 03/31/23 05:34





                                 03/31/23 05:34


Other Labs: 


                               Lab Results x24hrs











  03/31/23 03/31/23 Range/Units





  05:34 05:34 


 


WBC  4.7 L   (4.8-10.8)  x10^3/uL


 


RBC  3.91 L   (4.20-5.40)  10^6/uL


 


Hgb  10.4 L   (12.0-16.0)  g/dL


 


Hct  34.1 L   (37.0-47.0)  %


 


MCV  87.2   (81.0-99.0)  fL


 


MCH  26.6 L   (27.0-31.0)  pg


 


MCHC  30.5 L   (32.0-36.0)  g/dL


 


RDW  14.6   (12.0-15.0)  %


 


Plt Count  141   (130-450)  10^3/uL


 


MPV  11.0 H   (7.9-10.8)  fL


 


Neut # (Auto)  2.6   (1.5-6.6)  10^3/uL


 


Lymph # (Auto)  1.2 L   (1.5-3.5)  10^3/uL


 


Mono # (Auto)  0.5   (0.0-1.0)  10^3/uL


 


Eos # (Auto)  0.3   (0.0-0.7)  10^3/uL


 


Baso # (Auto)  0.0   (0.0-0.1)  10^3/uL


 


Absolute Nucleated RBC  0.00   x10^3/uL


 


Nucleated RBC %  0.0   /100WBC


 


Sodium   142  (135-145)  mmol/L


 


Potassium   3.4 L  (3.5-5.0)  mmol/L


 


Chloride   111  (101-111)  mmol/L


 


Carbon Dioxide   26  (21-32)  mmol/L


 


Anion Gap   5.0 L  (6-13)  


 


BUN   11  (6-20)  mg/dL


 


Creatinine   0.3 L  (0.4-1.0)  mg/dL


 


Estimated GFR (MDRD)   209  (>89)  


 


Glucose   98  ()  mg/dL


 


Calcium   7.8 L  (8.5-10.3)  mg/dL














ABX Reporting


Has patient been on IV antibiotics over the past 48 hours?: Yes





Sepsis Event Note (H)





- Evaluation


Current Stage of Sepsis: Septic shock


Possible source of Sepsis: positive: Pulmonary, Genitourinary





- Sepsis Criteria


Sepsis Criteria: Respiratory: Increasing oxygen requirements, CNS: altered 

consciousness (unrelated to primary neuro pathology), MAP less than 65 mmHg, SBP

less than 90 mmHg, Metabolic: lactate > 2 mmol/L





Assessment/Plan





- Problem List


(1) Proteus infection


Impression: 


(1) Sepsis with shock resolved with proteus bacteremia identified


Impression: 








The patient presented with a blood pressure of 69 systolic, tachycardic, very 

high white count of 30, elevated lactic acid and the source appears to be either

COVID or healthcare associated pneumonia or UTI. Since receiving antibiotics, 

she has defervesced.  Her last temperature was on March 26 and she has been 

afebrile since then.  There has been no tachycardia.  And blood pressure has 

come up today to 140/90 and 140/85 after being in the 120s for the last couple 

of days.  She is no longer hypoxic and is 97 to 99% on room air. Preliminary 

blood cultures have gram-negative bacilli.  PCR has identified it is Proteus and

3/30/23 results confirmed Blood culture is positive for Proteus.  It is 

resistant to ciprofloxacin, and intermediate sensitivity to levofloxacin.  

Otherwise sensitive to ampicillin, Unasyn, cefepime, Ceftriaxone, Zosyn and 

Bactrim.  Antibiotics have been ordered to treat both pneumonia and UTI.  As 

such she is on ceftriaxone day #7, and completed azithromycin 3 doses on 3/28.  

While she has responded to treatment with normalization of her vital signs, and 

obtundation has improved to the point that she can at least respond to swallow 

food, she still has increased respiratory effort or sounds.  Those are slowly 

improving but she is very cachectic.  Overall prognosis for her is poor. She was

on D5 normal saline and I was going to stop her IV fluids.  But she developed 

hyperchloremia, hypernatremia.  I changed her to D5 3/29 in the evening.   

Sodium has improved from 154->147>>142.  Chloride is getting better and is come 

down to 117 from 127.  So she is responding


Plan:


stop IVF but continue to monitor Na, K, creat


Because the patient's POLST indicated she wants comfort directed care, we will 

not plan to use pressors or put her in the ICU


I will continue ceftriaxone for 7-10 days while she is here since here proteus 

has multidrug resistance and can't be easily switched to po 





(2) LAINE (acute kidney injury) resolved. Hypokalemia. Hypernatremia. 


Conclusion/Plan: 


This patient's baseline creatinine is 0.4 (records and labs were reviewed). 

Presented on admission with BUN/creatinine of 44/2.3, consistent with marked 

volume depletion with this septic shock


                                Laboratory Tests











  03/26/23 03/27/23 03/28/23





  09:11 05:42 09:26


 


Creatinine  2.3 H  1.2 H  0.5





                                Laboratory Tests











  03/29/23





  08:00


 


Creatinine  0.4











                                Laboratory Tests











  03/30/23 03/31/23





  05:53 05:34


 


Creatinine  0.4  0.3 L











 She has responded well to fluid resuscitation antibiotics.  Her creatinine is 

now back to baseline.  I can infer that she is improved from that regard, but 

this unfortunate female is still quite lethargic, and a poor prognosis. 

Potassium is still low today.  This is in spite of potassium riders several days

in a row.  Today is 3.4.  We will give 40 meq potassium riders and recheck 

tomorrow.


Plan:


Stop IV fluids.


Potassium riders, 40 mill equivalents today and recheck tomorrow


Avoid nephrotoxins


Follow BMP daily.








(3) Obtundation improved. 





more awake and able to eat some food. responds to voice and commands but slowly.

She is at baseline from what I can tell. Granddaughter states that she can get 

very muted and withdrawn if she does not give her medications.  This makes sense

that now that she has been able to swallow, and I started her medications, the 

patient is little bit more alert.


Conclusion/Plan: 


Patient is underlying dementia and Parkinson's.  She presented as obtunded 

condition 2 months ago also when she had sepsis


Plan:


continue abx





(4) COVID


Conclusion/Plan: 


According to the ED providers discussion with the daughter by phone, this 

patient tested positive for COVID 1 week ago.


The patient has some hypoxia, patchy infiltrates on chest x-ray


Plan:


IV Decadron Day #6, plan for 10 days


The patient is not a candidate to start remdesivir given her onset being a week 

ago or more





(5) Hypoxia resolved


Conclusion/Plan: 


This is likely from her pneumonia. Even though she was saturating at 97 to 99%, 

she was still getting nasal cannula on her first day here.  Starting on the 27th

we took off her oxygen and she has been 99 to 100% on room air.


Plan:


We will provide supplemental oxygen, saturation target will be 90% or above





(6) UTI (urinary tract infection)


Conclusion/Plan: 


Patient gets frequent UTIs.  The UA now showed a lot of squamous cells therefore

no culture was indicated.


Patient has already been started on empiric ceftriaxone which would treat a UTI 

as well and is treating this pneumonia.  Proteus is growing in her blood.


Plan:


No repeat UA


Continue empiric iv ceftriaxone


Qualifiers: 


   Urinary tract infection type: site unspecified   Hematuria presence: without 

hematuria   Qualified Code(s): N39.0 - Urinary tract infection, site not 

specified   





(7) Parkinson's disease dementia


Conclusion/Plan: 


The daughter had told this that even at her best, the patient has 

hallucinations. I have her meds since she  is swallowing now.





mirtazapine 7.5 p.o. every afternoon.  She is on 2 forms of carbidopa levodopa. 

Extended release 25/100 twice daily.  And carbidopa levodopa 25/100 at 6 in the 

morning, 10 AM, 1400, 1800, and 2200.








(8)Severe protein calorie malnutrition





In April 2017 she was 74 kg.  June 2017 she was 69 kg.  November 2021 she was 54

kg.  January 2023 she was 45 kg.  She is 44 kg on admission.


My suspicion is that she has dysphagia due to her Parkinson's.  Lack of 

coordination with swallowing mechanism.  This is very common with motor neuron 

disease.  Focus is on comfort measures so she is not a candidate for tube feeds 

or PEG tube.

















(2) Sepsis


Qualifiers: 


   Qualified Code(s): A41.9 - Sepsis, unspecified organism; R65.21 - Severe 

sepsis with septic shock; N17.9 - Acute kidney failure, unspecified

## 2023-04-01 LAB
ANION GAP SERPL CALCULATED.4IONS-SCNC: 4 MMOL/L (ref 6–13)
BASOPHILS NFR BLD AUTO: 0 10^3/UL (ref 0–0.1)
BASOPHILS NFR BLD AUTO: 0.4 %
BUN SERPL-MCNC: 10 MG/DL (ref 6–20)
CALCIUM UR-MCNC: 8.2 MG/DL (ref 8.5–10.3)
CHLORIDE SERPL-SCNC: 112 MMOL/L (ref 101–111)
CO2 SERPL-SCNC: 28 MMOL/L (ref 21–32)
CREAT SERPLBLD-SCNC: 0.4 MG/DL (ref 0.4–1)
EOSINOPHIL # BLD AUTO: 0.4 10^3/UL (ref 0–0.7)
EOSINOPHIL NFR BLD AUTO: 8.7 %
ERYTHROCYTE [DISTWIDTH] IN BLOOD BY AUTOMATED COUNT: 14.5 % (ref 12–15)
GFRSERPLBLD MDRD-ARVRAT: 150 ML/MIN/{1.73_M2} (ref 89–?)
GLUCOSE SERPL-MCNC: 79 MG/DL (ref 70–100)
HCT VFR BLD AUTO: 34 % (ref 37–47)
HGB UR QL STRIP: 10.5 G/DL (ref 12–16)
LYMPHOCYTES # SPEC AUTO: 1.1 10^3/UL (ref 1.5–3.5)
LYMPHOCYTES NFR BLD AUTO: 21.4 %
MCH RBC QN AUTO: 26.6 PG (ref 27–31)
MCHC RBC AUTO-ENTMCNC: 30.9 G/DL (ref 32–36)
MCV RBC AUTO: 86.1 FL (ref 81–99)
MONOCYTES # BLD AUTO: 0.4 10^3/UL (ref 0–1)
MONOCYTES NFR BLD AUTO: 8.9 %
NEUTROPHILS # BLD AUTO: 2.9 10^3/UL (ref 1.5–6.6)
NEUTROPHILS # SNV AUTO: 5 X10^3/UL (ref 4.8–10.8)
NEUTROPHILS NFR BLD AUTO: 58 %
NRBC # BLD AUTO: 0 /100WBC
NRBC # BLD AUTO: 0 X10^3/UL
PDW BLD AUTO: 10.3 FL (ref 7.9–10.8)
PLATELET # BLD: 141 10^3/UL (ref 130–450)
POTASSIUM SERPL-SCNC: 3.7 MMOL/L (ref 3.5–5)
RBC MAR: 3.95 10^6/UL (ref 4.2–5.4)
SODIUM SERPLBLD-SCNC: 144 MMOL/L (ref 135–145)

## 2023-04-01 RX ADMIN — DILTIAZEM HYDROCHLORIDE SCH: 120 CAPSULE, COATED, EXTENDED RELEASE ORAL at 07:36

## 2023-04-01 RX ADMIN — SODIUM CHLORIDE, PRESERVATIVE FREE SCH ML: 5 INJECTION INTRAVENOUS at 17:39

## 2023-04-01 RX ADMIN — CARBIDOPA AND LEVODOPA SCH TAB: 25; 100 TABLET ORAL at 05:06

## 2023-04-01 RX ADMIN — CARBIDOPA AND LEVODOPA SCH TAB: 25; 100 TABLET ORAL at 21:41

## 2023-04-01 RX ADMIN — CARBIDOPA AND LEVODOPA SCH TAB: 25; 100 TABLET ORAL at 14:05

## 2023-04-01 RX ADMIN — METOPROLOL SUCCINATE SCH MG: 25 TABLET, EXTENDED RELEASE ORAL at 20:35

## 2023-04-01 RX ADMIN — ENOXAPARIN SODIUM SCH MG: 100 INJECTION SUBCUTANEOUS at 07:44

## 2023-04-01 RX ADMIN — SODIUM CHLORIDE, PRESERVATIVE FREE SCH ML: 5 INJECTION INTRAVENOUS at 07:35

## 2023-04-01 RX ADMIN — SODIUM CHLORIDE, PRESERVATIVE FREE SCH ML: 5 INJECTION INTRAVENOUS at 00:12

## 2023-04-01 RX ADMIN — ASPIRIN SCH MG: 81 TABLET, COATED ORAL at 07:44

## 2023-04-01 RX ADMIN — Medication SCH TAB: at 07:35

## 2023-04-01 RX ADMIN — SODIUM CHLORIDE SCH MLS/HR: 900 INJECTION INTRAVENOUS at 07:35

## 2023-04-01 RX ADMIN — MIRTAZAPINE SCH MG: 15 TABLET, FILM COATED ORAL at 20:35

## 2023-04-01 RX ADMIN — PRAMIPEXOLE DIHYDROCHLORIDE SCH MG: 0.25 TABLET ORAL at 20:36

## 2023-04-01 RX ADMIN — CARBIDOPA AND LEVODOPA SCH TAB: 25; 100 TABLET ORAL at 11:09

## 2023-04-01 RX ADMIN — CARBIDOPA AND LEVODOPA SCH TAB: 25; 100 TABLET ORAL at 17:39

## 2023-04-01 RX ADMIN — CARBIDOPA AND LEVODOPA SCH TAB: 25; 100 TABLET, EXTENDED RELEASE ORAL at 20:35

## 2023-04-01 RX ADMIN — CARBIDOPA AND LEVODOPA SCH TAB: 25; 100 TABLET, EXTENDED RELEASE ORAL at 07:45

## 2023-04-01 NOTE — PROVIDER PROGRESS NOTE
Objective





- Vital Signs/Intake & Output


Vital Signs: 


                                Vital Signs x48h











  Temp Pulse Resp BP Pulse Ox


 


 04/01/23 15:48  36.8 C  69  24  121/75  98


 


 04/01/23 13:00  36.6 C  79  20  109/61 











Intake & Output: 


                                 Intake & Output











 03/29/23 03/30/23 03/31/23 04/01/23





 23:59 23:59 23:59 23:59


 


Intake Total 3026.667 3985 2588.333 250


 


Output Total 2050 2300 2275 975


 


Balance 995.122 2872 313.333 -725














- Lab Results


Fish Bones: 


                                 04/01/23 05:54





                                 04/01/23 05:54


Other Labs: 


                               Lab Results x24hrs











  04/01/23 04/01/23 Range/Units





  05:54 05:54 


 


WBC  5.0   (4.8-10.8)  x10^3/uL


 


RBC  3.95 L   (4.20-5.40)  10^6/uL


 


Hgb  10.5 L   (12.0-16.0)  g/dL


 


Hct  34.0 L   (37.0-47.0)  %


 


MCV  86.1   (81.0-99.0)  fL


 


MCH  26.6 L   (27.0-31.0)  pg


 


MCHC  30.9 L   (32.0-36.0)  g/dL


 


RDW  14.5   (12.0-15.0)  %


 


Plt Count  141   (130-450)  10^3/uL


 


MPV  10.3   (7.9-10.8)  fL


 


Neut # (Auto)  2.9   (1.5-6.6)  10^3/uL


 


Lymph # (Auto)  1.1 L   (1.5-3.5)  10^3/uL


 


Mono # (Auto)  0.4   (0.0-1.0)  10^3/uL


 


Eos # (Auto)  0.4   (0.0-0.7)  10^3/uL


 


Baso # (Auto)  0.0   (0.0-0.1)  10^3/uL


 


Absolute Nucleated RBC  0.00   x10^3/uL


 


Nucleated RBC %  0.0   /100WBC


 


Sodium   144  (135-145)  mmol/L


 


Potassium   3.7  (3.5-5.0)  mmol/L


 


Chloride   112 H  (101-111)  mmol/L


 


Carbon Dioxide   28  (21-32)  mmol/L


 


Anion Gap   4.0 L  (6-13)  


 


BUN   10  (6-20)  mg/dL


 


Creatinine   0.4  (0.4-1.0)  mg/dL


 


Estimated GFR (MDRD)   150  (>89)  


 


Glucose   79  ()  mg/dL


 


Calcium   8.2 L  (8.5-10.3)  mg/dL














Sepsis Event Note (H)





- Evaluation


Current Stage of Sepsis: Septic shock


Possible source of Sepsis: positive: Pulmonary, Genitourinary





- Sepsis Criteria


Sepsis Criteria: Respiratory: Increasing oxygen requirements, CNS: altered 

consciousness (unrelated to primary neuro pathology), MAP less than 65 mmHg, SBP

less than 90 mmHg, Metabolic: lactate > 2 mmol/L





Assessment/Plan





- Problem List


(1) Proteus infection


Impression: 


 Sepsis with shock resolved with proteus bacteremia identified








The patient presented with a blood pressure of 69 systolic, tachycardic, very 

high white count of 30, elevated lactic acid and the source appears to be either

COVID or healthcare associated pneumonia or UTI. Since receiving antibiotics, 

she has defervesced.  Her last temperature was on March 26 and she has been 

afebrile since then.  There has been no tachycardia.  And blood pressure has 

come up today to 140/90 and 140/85 after being in the 120s for the last couple 

of days.  She is no longer hypoxic and is 97 to 99% on room air. Preliminary 

blood cultures have gram-negative bacilli.  PCR has identified it is Proteus and

3/30/23 results confirmed Blood culture is positive for Proteus.  It is 

resistant to ciprofloxacin, and intermediate sensitivity to levofloxacin.  

Otherwise sensitive to ampicillin, Unasyn, cefepime, Ceftriaxone, Zosyn and 

Bactrim.  Antibiotics have been ordered to treat both pneumonia and UTI.  As 

such she is on ceftriaxone day #7, and completed azithromycin 3 doses on 3/28.  

While she has responded to treatment with normalization of her vital signs, and 

obtundation has improved to the point that she can at least respond to swallow 

food, she still has increased respiratory effort or sounds.  Those are slowly 

improving but she is very cachectic.  Overall prognosis for her is poor. She was

on D5 normal saline and I was going to stop her IV fluids.  But she developed 

hyperchloremia, hypernatremia.  I changed her to D5 3/29 in the evening.   

Sodium has improved from 154->147>>142>>144 today.  Chloride is getting better 

and is come down to 117 from 127.  As such she did respond to just doing D5.  I 

think the normal saline made her have her hypernatremia hyperchloremia.


Plan:


IVF stopped 3/31 but continue to monitor Na, K, creat


Because the patient's POLST indicated she wants comfort directed care, we will 

not plan to use pressors or put her in the ICU


I will continue ceftriaxone for 7-10 days while she is here since here proteus 

has multidrug resistance and can't be easily switched to po . Today is Day #7. I

anticipate discharge on day #9.





(2) LAINE (acute kidney injury) resolved. Hypokalemia. Hypernatremia. 


Conclusion/Plan: 


This patient's baseline creatinine is 0.4 (records and labs were reviewed). 

Presented on admission with BUN/creatinine of 44/2.3, consistent with marked 

volume depletion with this septic shock


                                Laboratory Tests











  03/26/23 03/27/23 03/28/23





  09:11 05:42 09:26


 


Creatinine  2.3 H  1.2 H  0.5





                                Laboratory Tests











  03/29/23





  08:00


 


Creatinine  0.4











                                Laboratory Tests











  03/30/23 03/31/23





  05:53 05:34


 


Creatinine  0.4  0.3 L











 She has responded well to fluid resuscitation antibiotics.  Her creatinine is 

now back to baseline.  I can infer that she is improved from that regard, but 

this unfortunate female is still quite lethargic, and a poor prognosis. 

Potassium is still low today.  This is in spite of potassium riders several days

in a row.  Today is 3.7 so no K supplmentation needed today.   


Plan:





Plan:


Follow BMP daily.








(3) Obtundation improved. 





more awake and able to eat some food. responds to voice and commands but slowly.

She is at baseline from what I can tell. Granddaughter states that she can get 

very muted and withdrawn if she does not give her medications.  This makes sense

that now that she has been able to swallow, and I started her medications, the 

patient is little bit more alert.


Conclusion/Plan: 


Patient is underlying dementia and Parkinson's.  She presented as obtunded 

condition 2 months ago also when she had sepsis


Plan:


continue abx





(4) COVID


Conclusion/Plan: 


According to the ED providers discussion with the daughter by phone, this 

patient tested positive for COVID 1 week ago.


The patient has some hypoxia, patchy infiltrates on chest x-ray


Plan:


IV Decadron Day #7, plan for 10 days


The patient is not a candidate to start remdesivir given her onset being a week 

ago or more





(5) Hypoxia resolved


Conclusion/Plan: 


This is likely from her pneumonia. Even though she was saturating at 97 to 99%, 

she was still getting nasal cannula on her first day here.  Starting on the 27th

we took off her oxygen and she has been 99 to 100% on room air.


Plan:


We will provide supplemental oxygen, saturation target will be 90% or above





(6) UTI (urinary tract infection)


Conclusion/Plan: 


Patient gets frequent UTIs.  The UA now showed a lot of squamous cells therefore

no culture was indicated.


Patient has already been started on empiric ceftriaxone which would treat a UTI 

as well and is treating this pneumonia.  Proteus is growing in her blood.


Plan:


No repeat UA


Continue empiric iv ceftriaxone


Qualifiers: 


   Urinary tract infection type: site unspecified   Hematuria presence: without 

hematuria   Qualified Code(s): N39.0 - Urinary tract infection, site not 

specified   





(7) Parkinson's disease dementia


Conclusion/Plan: 


The daughter had told us, that even at her best, the patient has hallucinations.

I have her meds since she  is swallowing now. Her voice has steadily improved 

now that she has been on medications.





mirtazapine 7.5 p.o. every afternoon.  She is on 2 forms of carbidopa levodopa. 

Extended release 25/100 twice daily.  And carbidopa levodopa 25/100 at 6 in the 

morning, 10 AM, 1400, 1800, and 2200.








(8)Severe protein calorie malnutrition





In April 2017 she was 74 kg.  June 2017 she was 69 kg.  November 2021 she was 54

kg.  January 2023 she was 45 kg.  She is 44 kg on admission.


My suspicion is that she has dysphagia due to her Parkinson's.  Lack of 

coordination with swallowing mechanism.  This is very common with motor neuron 

disease.  Focus is on comfort measures so she is not a candidate for tube feeds 

or PEG tube.

















(2) Sepsis


Qualifiers: 


   Qualified Code(s): A41.9 - Sepsis, unspecified organism; R65.21 - Severe 

sepsis with septic shock; N17.9 - Acute kidney failure, unspecified

## 2023-04-02 LAB
ANION GAP SERPL CALCULATED.4IONS-SCNC: 4 MMOL/L (ref 6–13)
BUN SERPL-MCNC: 9 MG/DL (ref 6–20)
CALCIUM UR-MCNC: 8.1 MG/DL (ref 8.5–10.3)
CHLORIDE SERPL-SCNC: 110 MMOL/L (ref 101–111)
CO2 SERPL-SCNC: 28 MMOL/L (ref 21–32)
CREAT SERPLBLD-SCNC: 0.3 MG/DL (ref 0.4–1)
GFRSERPLBLD MDRD-ARVRAT: 209 ML/MIN/{1.73_M2} (ref 89–?)
GLUCOSE SERPL-MCNC: 81 MG/DL (ref 70–100)
POTASSIUM SERPL-SCNC: 3.5 MMOL/L (ref 3.5–5)
SODIUM SERPLBLD-SCNC: 142 MMOL/L (ref 135–145)

## 2023-04-02 RX ADMIN — SODIUM CHLORIDE, PRESERVATIVE FREE SCH ML: 5 INJECTION INTRAVENOUS at 08:32

## 2023-04-02 RX ADMIN — ENOXAPARIN SODIUM SCH MG: 100 INJECTION SUBCUTANEOUS at 08:50

## 2023-04-02 RX ADMIN — SENNOSIDES SCH MG: 8.6 TABLET, FILM COATED ORAL at 08:54

## 2023-04-02 RX ADMIN — ASPIRIN SCH MG: 81 TABLET, COATED ORAL at 08:54

## 2023-04-02 RX ADMIN — ACETAMINOPHEN PRN MG: 500 TABLET ORAL at 12:32

## 2023-04-02 RX ADMIN — METOPROLOL SUCCINATE SCH MG: 25 TABLET, EXTENDED RELEASE ORAL at 21:29

## 2023-04-02 RX ADMIN — CARBIDOPA AND LEVODOPA SCH TAB: 25; 100 TABLET ORAL at 05:56

## 2023-04-02 RX ADMIN — CARBIDOPA AND LEVODOPA SCH TAB: 25; 100 TABLET ORAL at 21:29

## 2023-04-02 RX ADMIN — SODIUM CHLORIDE SCH MLS/HR: 900 INJECTION INTRAVENOUS at 08:39

## 2023-04-02 RX ADMIN — Medication SCH TAB: at 08:54

## 2023-04-02 RX ADMIN — CARBIDOPA AND LEVODOPA SCH TAB: 25; 100 TABLET ORAL at 17:32

## 2023-04-02 RX ADMIN — RUGBY ZINC OXIDE 20% PRN APPLIC: 20 OINTMENT TOPICAL at 10:02

## 2023-04-02 RX ADMIN — CARBIDOPA AND LEVODOPA SCH TAB: 25; 100 TABLET, EXTENDED RELEASE ORAL at 21:30

## 2023-04-02 RX ADMIN — DILTIAZEM HYDROCHLORIDE SCH: 120 CAPSULE, COATED, EXTENDED RELEASE ORAL at 09:11

## 2023-04-02 RX ADMIN — SODIUM CHLORIDE, PRESERVATIVE FREE SCH ML: 5 INJECTION INTRAVENOUS at 17:32

## 2023-04-02 RX ADMIN — CARBIDOPA AND LEVODOPA SCH TAB: 25; 100 TABLET, EXTENDED RELEASE ORAL at 08:54

## 2023-04-02 RX ADMIN — MIRTAZAPINE SCH MG: 15 TABLET, FILM COATED ORAL at 21:30

## 2023-04-02 RX ADMIN — CARBIDOPA AND LEVODOPA SCH TAB: 25; 100 TABLET ORAL at 09:48

## 2023-04-02 RX ADMIN — DOCUSATE SODIUM SCH MG: 250 CAPSULE, LIQUID FILLED ORAL at 08:54

## 2023-04-02 RX ADMIN — CARBIDOPA AND LEVODOPA SCH TAB: 25; 100 TABLET ORAL at 13:49

## 2023-04-02 RX ADMIN — PRAMIPEXOLE DIHYDROCHLORIDE SCH MG: 0.25 TABLET ORAL at 21:30

## 2023-04-02 RX ADMIN — SODIUM CHLORIDE, PRESERVATIVE FREE SCH ML: 5 INJECTION INTRAVENOUS at 00:13

## 2023-04-02 NOTE — DISCHARGE PLAN
Discharge Plan for SNF / [facility]





- Discharge Plan And Transition Orders


Problem Reviewed?: Yes


Disposition: 01 Home, Self Care


Condition: Poor


Allergies and Adverse Reactions: 


                                    Allergies











Allergy/AdvReac Type Severity Reaction Status Date / Time


 


alendronate sodium Allergy  Unknown Verified 03/26/23 08:54





[From Fosamax]     


 


amoxicillin Allergy  Unknown Verified 03/26/23 08:54


 


oxycodone Allergy  Unknown Verified 03/26/23 08:54


 


risedronate sodium Allergy  Unknown Verified 03/26/23 08:54


 


Sulfa (Sulfonamide Allergy  Rash Verified 03/26/23 08:54





Antibiotics)     


 


tramadol Allergy  Unknown Verified 03/26/23 08:54











Health Concerns: 


This 89-year-old female has Parkinson's dementia and hallucinations, severe 

protein calorie malnutrition, is basically bedbound and lives at a skilled 

nursing facility.  She tested positive for COVID 8 days prior to admission.  On 

the day of admission the patient was found obtunded, with a low blood pressure 

and EMS was called.  Daughter and POA states that mom is a DO NOT RESUSCITATE.  

In the emergency room she had a patchy infiltrate on chest x-ray but was felt to

have a severely abnormal urinalysis causing UTI and sepsis.  Blood cultures were

positive for Proteus.  Resistant to quinolones.  A total of 9 days of IV 

antibiotic therapy was given.  During her stay the patient had no fever.  She 

had no hypotension.  Due to her obtunded status, she was not able to get her 

Parkinson's medications.  This resulted in almost a mute status.  Vocal cords 

were severely impaired and we could barely hear her at times.  Once metabolic 

encephalopathy resolved, and she was able to swallow medications, her voice 

slowly came back.  It is still almost an audible but she responds very nicely to

her granddaughter who came in several times to feed her grandmother.  Acute kid

jeanmarie insufficiency of 2.3 creatinine resolved so that the time of discharge she 

was 0.3.  Hypokalemia was treated with potassium supplementation.  White cell 

count on admission was 30,000 was 5.0 at discharge.


Plan of Treatment: 


She is to return to her residential living.  She is not a candidate for physical

therapy.


Care Goals: 


To return to her residential living with focus on comfort measures. 


Assessment: 


patient has dementia, low voice, bedbound and her advocates are her daughter and

granddaughter.





- SNF / [facility] Transition Orders


Admit to (Facility): [facility]


Under the care of (Name): Kamar TEJADA


Discharge Diagnosis: 


1.  Sepsis


2.  Proteus UTI


3.  Acute kidney injury


4.  Hypokalemia


5.  Hypernatremia


6.  Metabolic encephalopathy


8.  History of COVID 1 week before admission


9.  Hypoxia, present on admission, resolved


10.  Parkinson's disease with dementia


11.  Severe protein calorie malnutrition


12.  Bedbound status





Medicare Certification Statement: 





Notify PCP of admission and forward orders to primary provider for signature.





Weight on admission and: Daily


Other Notification Orders: 


Call PCP immediately if patient develops dyspnea, chest pain/tightness or edema.





House Bowel Program: Yes


Additional Bowel Program Orders: 


If no BM after 2 days, nurse may give M.O.M. 30ml PO PRN and/or ducolax Supp 1 

IA and/or LORNA 250mg P.O., and/or senna 1-2 tabs PO.  On day 3 nurse may give 

repeat above order until residents constipation is resolved. 





Annual Influenza Vaccine (between Sept 1st and March 31st): Yes


Two-step PPD per [facility] [id number] or approved exception documents: Yes


Medication Orders: 





PLEASE REFER TO THE DISCHARGE MEDICATION LIST.








Insulin Orders?: No





- Diet


Type: Geriatric


Texture: Puree


Liquids: Nectar thick


May have monthly special meal: Yes





- Therapies | Activity


Therapy: Evaluation | Treat if indicated: Speech


Rehabilitation Potential: Maximize functional status


Activity: Activity as Tolerated

## 2023-04-02 NOTE — DISCHARGE SUMMARY
Discharge Summary


Admit Date: 03/26/23


Discharge Date: 04/02/23


Discharging Provider: Mishel Nevarez MD


Primary Care Provider: [facility] MD


Code Status: Do Not Attempt Resuscitation


Condition at Discharge: Poor


Discharge Disposition: 01 Home, Self Care





- DIAGNOSES


Discharge Diagnoses with Status of Each Condition: 


1.  Sepsis


2.  Proteus UTI


3.  Acute kidney injury


4.  Hypokalemia


5.  Hypernatremia


6.  Metabolic encephalopathy


8.  History of COVID 1 week before admission


9.  Hypoxia, present on admission, resolved


10.  Parkinson's disease with dementia


11.  Severe protein calorie malnutrition








- HPI


History of Present Illness: 





This is an 89-year-old white female with a history of Parkinson's disease with d

rebecca, she was admitted here 2 months ago for sepsis from a UTI.  Her daughter

was often at the bedside and did remark that she has hallucinations which are 

her baseline.


Today the staff at her facility noticed that she was more obtunded and had a low

blood pressure and EMS was called. Upon arrival to the ED she had systolic blood

pressure of 69.  Work-up showed that she is COVID-positive, chest x-ray has 

patchy infiltrates, she has elevated WBC of 30, elevated Lactic acid level of 

2.3, elevated BUN/creat 44/2.3 (her baseline creat is 0.4). She was started on 

IV fluids, has gotten 2 L.  Her blood pressure has improved to 85 systolic.  

Patient was desaturating at 87% on room air at presentation and she was put on 

O2 2L via nasal cannula, saturations improved to100%. The ED provider reached 

out to the daughter who herself has COVID, and the daughter said that the mother

tested positive for COVID 1 week ago, unknown how long symptoms have lasted 

however. The ED provider discussed with the daughter that the patient is in 

septic shock, and has a poor prognosis because of her underlying dementia, 

current new COVID with desaturation, and new LAINE. She has a POLST in place that 

indicates DNR and limited interventions.  The daughter asked that everything be 

tried to treat her short of aggressive measures. The ED provider reached out to 

me on the Hospitalist team and we discussed the patient. She is being admitted 

to manage septic shock, UTI, HCAP, obtundation. She is in critical condition.


CODE BLUE status is DNR/DNI.





- Past Medical History


Cardiovascular: reports: Hypertension, High cholesterol


Respiratory: reports: None


Neuro: reports: Dementia, TIA, Parkinson's


Endocrine/Autoimmune: reports: None


Psych: reports: None


Musculoskeletal: reports: Osteoarthritis, Osteoporosis, Fatigue


MRSA Hx?: No





- Past Surgical History


/GYN: reports: Hysterectomy





- CONSULTS | PROCEDURES


Procedures: 


Chest x-ray has mild edema versus atypical pneumonia





Blood cultures, all 4 bottles were positive for Proteus mirabilis on admission.








- HOSPITAL COURSE


Hospital Course: 


Although the patient is DO NOT RESUSCITATE, and focuses on comfort, her 

daughter, advocate, and power of  still wanted her admitted and treated 

with IV antibiotics and IV fluids.  She later shared with one of the nurses that

she felt offended when comfort measures were going to be focused upon and she 

felt like she was being asked to "assassinate her mother".





The patient responded to IV fluids, antibiotics.  Blood cultures grew out 

Proteus.  Proteus was resistant to ciprofloxacin, Levaquin but sensitive to all 

else.  She was kept in the hospital to complete 7 to 10 days of IV antibiotic 

therapy.  During this time she was on Rocephin.  At 1 point, normal saline 

resulted in hypernatremia and hypokalemia.  Chloride was elevated.  She was 

switched to D5 and responded with normalization of those lab values.  At 

discharge sodium was 142, potassium 3.5, chloride 110, BUN 9. Admission 

creatinine was 2.3.  By the time of discharge she was 0.3.  White cell count was

30.3 on admission.  She was 5.0 on the day of discharge.





When she was initially admitted she was severely obtunded, unable to swallow and

asked that she could not get any of her Parkinson's meds.  When she recovered fr

om the sepsis, she had a very low, almost an audible voice.  Did not have enough

strength to even lift her head off the bed.  Her Parkinson's medications helped 

quite a bit.  She is still very weak, bedbound.  This is her baseline status.  

But her voice came back and she was able to communicate with us effectively.  

She seems to be very gentle, pleasant individual.  She is disoriented to place 

and time.  But recognizes her family when they come to visit.  Several times her

granddaughter came to feed her since appetite was poor.  When she came here, she

was on a regular diet.  At discharge she is in a pured soft diet.  It is hoped 

that swallowing will become normal, voice will become normal and she can go back

to a regular diet.





Discharge temperature was 36.3.  Heart rate 64.  Blood pressure 121/75.  

Respirations 22.  98% on room air.  She is a 5 foot 2 inch elderly female, 

cachectic, very thin, 44 kg.  Dry lips.  Still cracked in spite of adequate 

hydration.  Low almost inaudible voice but smiling, cooperative.  Smile is 

forced.  Most of the time she is with flat affect, masked facies.  Lungs have 

coarse upper airway sounds, but no increased respiratory effort, no respiratory 

distress.  Very thin AP diameter.  Occasional cough, that is very weak, and 

really cannot bring up phlegm appropriately.  Regular rate and rhythm with a 

systolic ejection murmur.  Rib cage visible through her chest wall.  Abdomen is 

soft, scaphoid, nontender.  Normal bowel sounds.  She has not had a bowel 

movement since March 28.  Extremities are very thin, feet are puffy with edema. 

But the edema resolves abruptly at the shin and calves where she had SCDs.  She 

is not moving her limbs spontaneously.  She is deaf. She still really is 

bedbound.  She does not have any strength to be able to sit herself up.  

Completely dependent on nursing to feed her, bathe her, rolled her body to 

protect for bedsores, but will cooperate.





Greater than 30 minutes spent coordinating discharge.





- ALLERGIES


Allergies/Adverse Reactions: 


                                    Allergies











Allergy/AdvReac Type Severity Reaction Status Date / Time


 


alendronate sodium Allergy  Unknown Verified 03/26/23 08:54





[From Fosamax]     


 


amoxicillin Allergy  Unknown Verified 03/26/23 08:54


 


oxycodone Allergy  Unknown Verified 03/26/23 08:54


 


risedronate sodium Allergy  Unknown Verified 03/26/23 08:54


 


Sulfa (Sulfonamide Allergy  Rash Verified 03/26/23 08:54





Antibiotics)     


 


tramadol Allergy  Unknown Verified 03/26/23 08:54














- MEDICATIONS


Home Medications: 


                                Ambulatory Orders











 Medication  Instructions  Recorded  Confirmed


 


Carbidopa/Levodopa [Carbidopa-Levo 1 tab PO BID 04/30/17 03/26/23





ER  Tab]   


 


Ipratropium Bromide 2 sprays CANDE BID 04/30/17 03/26/23


 


Mirtazapine 7.5 mg PO QPM 04/30/17 03/26/23


 


Pramipexole [Mirapex] 0.25 mg PO QPM 04/30/17 03/26/23


 


Acetaminophen [Tylenol] 650 mg PO Q4H PRN 01/11/23 03/26/23


 


Aspirin [Aspirin EC] 81 mg PO DAILY 01/11/23 03/26/23


 


Carbidopa/Levodopa 25/100 [Sinemet 1 tab PO 5XD 01/11/23 03/26/23





25 mg/100 mg]   


 


D-Mannose [Azo D-Mannose] 500 mg PO DAILY 01/11/23 03/26/23


 


Estrogens, Conjugated Cream 0.5 g VG MOFR 01/11/23 03/26/23





[Premarin Cream]   


 


Lidocaine Patch 5% [Lidoderm Patch] 1 patch TOP DAILY PRN 01/11/23 03/26/23


 


Metoprolol Succinate [Toprol Xl] 12.5 mg PO QPM 01/11/23 03/26/23


 


Sennosides [Senna] 17.2 mg PO DAILY 01/11/23 03/26/23


 


polyethylene glycoL 3350 [Miralax] 17 g PO DAILY 01/11/23 03/26/23


 


Cholecalciferol (Vitamin D3) 1,250 mcg PO Q30D 03/26/23 03/26/23





[Vitamin D3]   














- LABS


Result Diagrams: 


                                 04/01/23 05:54





                                 04/02/23 05:55





- SEPSIS


Current Stage of Sepsis: Septic shock


Possible source of Sepsis: Pulmonary, Genitourinary


Sepsis Criteria: Respiratory: Increasing oxygen requirements, CNS: altered 

consciousness (unrelated to primary neuro pathology), MAP less than 65 mmHg, SBP

less than 90 mmHg, Metabolic: lactate > 2 mmol/L

## 2023-04-03 VITALS — SYSTOLIC BLOOD PRESSURE: 121 MMHG | DIASTOLIC BLOOD PRESSURE: 75 MMHG

## 2023-04-03 RX ADMIN — DOCUSATE SODIUM SCH MG: 250 CAPSULE, LIQUID FILLED ORAL at 08:52

## 2023-04-03 RX ADMIN — CARBIDOPA AND LEVODOPA SCH TAB: 25; 100 TABLET ORAL at 05:47

## 2023-04-03 RX ADMIN — SODIUM CHLORIDE, PRESERVATIVE FREE SCH ML: 5 INJECTION INTRAVENOUS at 01:00

## 2023-04-03 RX ADMIN — DILTIAZEM HYDROCHLORIDE SCH: 120 CAPSULE, COATED, EXTENDED RELEASE ORAL at 09:00

## 2023-04-03 RX ADMIN — ACETAMINOPHEN PRN MG: 500 TABLET ORAL at 10:46

## 2023-04-03 RX ADMIN — ENOXAPARIN SODIUM SCH MG: 100 INJECTION SUBCUTANEOUS at 08:52

## 2023-04-03 RX ADMIN — Medication SCH TAB: at 08:52

## 2023-04-03 RX ADMIN — ASPIRIN SCH MG: 81 TABLET, COATED ORAL at 08:52

## 2023-04-03 RX ADMIN — SODIUM CHLORIDE, PRESERVATIVE FREE SCH ML: 5 INJECTION INTRAVENOUS at 08:58

## 2023-04-03 RX ADMIN — CARBIDOPA AND LEVODOPA SCH TAB: 25; 100 TABLET ORAL at 10:45

## 2023-04-03 RX ADMIN — CARBIDOPA AND LEVODOPA SCH TAB: 25; 100 TABLET, EXTENDED RELEASE ORAL at 08:58

## 2023-04-03 RX ADMIN — Medication SCH TAB: at 08:53

## 2023-04-03 RX ADMIN — DOCUSATE SODIUM SCH MG: 250 CAPSULE, LIQUID FILLED ORAL at 08:53

## 2023-04-03 RX ADMIN — ASPIRIN SCH MG: 81 TABLET, COATED ORAL at 08:53

## 2023-04-03 RX ADMIN — SODIUM CHLORIDE SCH MLS/HR: 900 INJECTION INTRAVENOUS at 08:58

## 2023-04-03 RX ADMIN — SENNOSIDES SCH MG: 8.6 TABLET, FILM COATED ORAL at 08:54

## 2023-08-18 NOTE — PROVIDER PROGRESS NOTE
Subjective





- Prog Note Date


Prog Note Date: 04/02/23


Prog Note Time: 15:23





- Subjective


Subjective: 


Voice is louder.  She is swallowing better.  For the last 2 days she has become 

more more interactive.








Current Medications





- Current Medications


Current Medications: 





Active Medications





Acetaminophen (Acetaminophen 500 Mg Tablet)  1,000 mg PO Q6H PRN


   PRN Reason: Pain or Fever > 38C (100.4F)


   Last Admin: 04/02/23 12:32 Dose:  1,000 mg


   


Aspirin (Aspirin Ec 81 Mg Tablet)  81 mg PO DAILY [facility]


   Last Admin: 04/02/23 08:54 Dose:  81 mg


   


Carbidopa/Levodopa (Carbidopa/Levodopa Er 25 Mg/100 Mg Tablet)  1 tab PO BID [facility]


   Last Admin: 04/02/23 08:54 Dose:  1 tab


   


Carbidopa/Levodopa (Carbidopa/Levodopa 25 Mg/100 Mg Tablet)  1 tab PO 5XD [facility]


   Last Admin: 04/02/23 13:49 Dose:  1 tab


   


Cholecalciferol (Cholecalciferol 5,000 Unit Capsule)  50,000 unit PO Q30D [facility]


Docusate Sodium (Docusate Sodium 250 Mg Capsule)  250 - 500 mg PO DAILY [facility]


   Last Admin: 04/02/23 08:54 Dose:  250 mg


   


Enoxaparin Sodium (Enoxaparin 30 Mg/0.3 Ml Syringe)  30 mg SUBQ DAILY [facility]


   Last Admin: 04/02/23 08:50 Dose:  30 mg


   


Ceftriaxone Sodium 2 gm/ (Sodium Chloride)  100 mls @ 200 mls/hr IV DAILY [facility]


   Last Infusion: 04/02/23 09:41 Dose:  Infused


   


Lidocaine (Lidocaine Patch 5%)  1 patch TOP DAILY PRN


   PRN Reason: PAIN


Metoprolol Succinate (Metoprolol Succinate 25 Mg Tablet)  12.5 mg PO QPM [facility]


   Last Admin: 04/01/23 20:35 Dose:  12.5 mg


   


Mirtazapine (Mirtazapine 15 Mg Tablet)  7.5 mg PO QPM [facility]


   Last Admin: 04/01/23 20:35 Dose:  7.5 mg


   


Multi-Ingredient Ointment (Zinc Oxide 20% Oint 30 Gm Tube)  1 applic TOP PRN PRN


   PRN Reason: Skin Care


   Last Admin: 04/02/23 10:02 Dose:  1 applic


   


Multivitamins/Minerals (Multivitamin W/Minerals Tablet)  1 tab PO DAILYWM [facility]


   Last Admin: 04/02/23 08:54 Dose:  1 tab


   


**D-Mannose [Azo D- Mannose] 500 Mg Capsule**  1 each PO DAILY [facility]


   Last Admin: 04/02/23 09:11 Dose:  Not Given


   


Polyethylene Glycol (Polyethylene Glycol 3350 17 Gm Packet)  17 gm PO DAILY [facility]


   Last Admin: 04/02/23 08:51 Dose:  17 gm


   


Pramipexole Dihydrochloride (Pramipexole 0.25 Mg Tablet)  0.25 mg PO QPM [facility]


   Last Admin: 04/01/23 20:36 Dose:  0.25 mg


   


Senna (Senna 8.6 Mg Tablet)  8.6 - 17.2 mg PO DAILY [facility]


   Last Admin: 04/02/23 08:54 Dose:  8.6 mg


   


Sodium Chloride (Sodium Chloride Flush 0.9% 10 Ml Syringe)  10 ml IVP PRN PRN


   PRN Reason: AS NEEDED PER PROVIDER ORDERS


Sodium Chloride (Sodium Chloride Flush 0.9% 10 Ml Syringe)  10 ml IVP 

0100,0900,1700 [facility]


   Last Admin: 04/02/23 08:32 Dose:  10 ml


   





                                        





Carbidopa/Levodopa [Carbidopa-Levo ER  Tab] 1 tab PO BID 04/30/17 


Ipratropium Bromide 2 sprays CANDE BID 04/30/17 


Mirtazapine 7.5 mg PO QPM 04/30/17 


Pramipexole [Mirapex] 0.25 mg PO QPM 04/30/17 


Acetaminophen [Tylenol] 650 mg PO Q4H PRN 01/11/23 


Aspirin [Aspirin EC] 81 mg PO DAILY 01/11/23 


Carbidopa/Levodopa 25/100 [Sinemet 25 mg/100 mg] 1 tab PO 5XD 01/11/23 


D-Mannose [Azo D-Mannose] 500 mg PO DAILY 01/11/23 


Estrogens, Conjugated Cream [Premarin Cream] 0.5 g VG MOFR 01/11/23 


Lidocaine Patch 5% [Lidoderm Patch] 1 patch TOP DAILY PRN 01/11/23 


Metoprolol Succinate [Toprol Xl] 12.5 mg PO QPM 01/11/23 


Sennosides [Senna] 17.2 mg PO DAILY 01/11/23 


polyethylene glycoL 3350 [Miralax] 17 g PO DAILY 01/11/23 


Cholecalciferol (Vitamin D3) [Vitamin D3] 1,250 mcg PO Q30D 03/26/23 











Objective





- Vital Signs/Intake & Output


Reviewed Vital Signs: Yes


Vital Signs: 


                                Vital Signs x48h











  Temp Pulse Resp BP BP Pulse Ox


 


 04/02/23 12:14  36.4 C L  68  18  112/67   100


 


 04/02/23 08:28  36.5 C  80  20   118/78  98











Intake & Output: 


                                 Intake & Output











 03/30/23 03/31/23 04/01/23 04/02/23





 23:59 23:59 23:59 23:59


 


Intake Total 3985 2588.333 604 370


 


Output Total 2300 2275 1250 925


 


Balance 1685 313.333 -502 -970














- Objective


General Appearance: positive: Alert, Other (very cachectic elderly female, weak,

bedbound, able to swallow and speak again)


Eyes Bilateral: positive: PERRL, EOMI


ENT: positive: No signs of dehydration


Neck: positive: No JVD.  negative: Stiff neck


Respiratory: positive: No respiratory distress, Other (shallow, unlabored, 

diminished sounds).  negative: Wheezes, Rales, Rhonchi


Cardiovascular: positive: Regular rate & rhythm, Systolic murmur


Abdomen: positive: Non-tender, No organomegaly, Nml bowel sounds, No distention


Skin: positive: Warm, Dry, Pallor


Extremities: positive: Full ROM, No pedal edema


Neurologic/Psychiatric: positive: CN's nml (2-12).  negative: Motor nml (weak 

and bedbound, slack fascies, weak voice, able to move arms and legs but weak)





- Lab Results


Fish Bones: 


                                 04/01/23 05:54





                                 04/02/23 05:55


Other Labs: 


                               Lab Results x24hrs











  04/02/23 Range/Units





  05:55 


 


Sodium  142  (135-145)  mmol/L


 


Potassium  3.5  (3.5-5.0)  mmol/L


 


Chloride  110  (101-111)  mmol/L


 


Carbon Dioxide  28  (21-32)  mmol/L


 


Anion Gap  4.0 L  (6-13)  


 


BUN  9  (6-20)  mg/dL


 


Creatinine  0.3 L  (0.4-1.0)  mg/dL


 


Estimated GFR (MDRD)  209  (>89)  


 


Glucose  81  ()  mg/dL


 


Calcium  8.1 L  (8.5-10.3)  mg/dL














ABX Reporting


Has patient been on IV antibiotics over the past 48 hours?: Yes





Sepsis Event Note (H)





- Evaluation


Current Stage of Sepsis: Septic shock


Possible source of Sepsis: positive: Pulmonary, Genitourinary





- Sepsis Criteria


Sepsis Criteria: Respiratory: Increasing oxygen requirements, CNS: altered 

consciousness (unrelated to primary neuro pathology), MAP less than 65 mmHg, SBP

less than 90 mmHg, Metabolic: lactate > 2 mmol/L





Assessment/Plan





- Problem List


(1) Proteus infection


Impression: 


The patient presented with a blood pressure of 69 systolic, tachycardic, very 

high white count of 30, elevated lactic acid and the source appears to be either

COVID or healthcare associated pneumonia or UTI. Since receiving antibiotics, 

she has defervesced.  Her last temperature was on March 26 and she has been 

afebrile since then.  There has been no tachycardia.  And blood pressure has 

come up today to 140/90 and 140/85 after being in the 120s for the last couple 

of days.  She is no longer hypoxic and is 97 to 99% on room air. Preliminary 

blood cultures have gram-negative bacilli.  PCR has identified it is Proteus and

3/30/23 results confirmed Blood culture is positive for Proteus.  It is 

resistant to ciprofloxacin, and intermediate sensitivity to levofloxacin.  

Otherwise sensitive to ampicillin, Unasyn, cefepime, Ceftriaxone, Zosyn and 

Bactrim.  Antibiotics have been ordered to treat both pneumonia and UTI.  As 

such she is on ceftriaxone day #7, and completed azithromycin 3 doses on 3/28.  

While she has responded to treatment with normalization of her vital signs, and 

obtundation has improved to the point that she can at least respond to swallow 

food, she still has increased respiratory effort or sounds.  Those are slowly 

improving but she is very cachectic.  Overall prognosis for her is poor. She was

on D5 normal saline and I was going to stop her IV fluids.  But she developed 

hyperchloremia, hypernatremia.  I changed her to D5 3/29 in the evening.   Sodi

um has improved from 154->147>>142>>144 today.  Chloride is getting better and 

is come down to 117 from 127.  As such she did respond to just doing D5.  I 

think the normal saline made her have her hypernatremia hyperchloremia.


Plan:


IVF stopped 3/31 but continue to monitor Na, K, creat


Because the patient's POLST indicated she wants comfort directed care, we will 

not plan to use pressors or put her in the ICU


I will continue ceftriaxone for 7-10 days while she is here since here proteus 

has multidrug resistance and can't be easily switched to po . Today is Day #8. I

anticipate discharge on day #9 which is tomorrow. She lives at a skilled nursing

facility and will be returned to the facility.  We will check COVID status 

today.





(2) LAINE (acute kidney injury) resolved. Hypokalemia. Hypernatremia. 


Conclusion/Plan: 


This patient's baseline creatinine is 0.4 (records and labs were reviewed). 

Presented on admission with BUN/creatinine of 44/2.3, consistent with marked 

volume depletion with this septic shock


                                Laboratory Tests











  03/26/23 03/27/23 03/28/23





  09:11 05:42 09:26


 


Creatinine  2.3 H  1.2 H  0.5





                                Laboratory Tests











  03/29/23





  08:00


 


Creatinine  0.4











                                Laboratory Tests











  03/30/23 03/31/23





  05:53 05:34


 


Creatinine  0.4  0.3 L





                                Laboratory Tests











  04/01/23 04/02/23





  05:54 05:55


 


Creatinine  0.4  0.3 L














 She has responded well to fluid resuscitation antibiotics.  Her creatinine is 

now back to baseline.  I can infer that she is improved from that regard, but 

this unfortunate female is still quite lethargic, and a poor prognosis. 

Potassium is still low today.  This is in spite of potassium riders several days

in a row.  Today is 3.7 so no K supplmentation needed today.   


Plan:





Plan:


Follow BMP daily.








(3) Obtundation resolved.





more awake and able to eat some food. responds to voice and commands but slowly.

She is at baseline from what I can tell. Granddaughter states that she can get 

very muted and withdrawn if she does not give her medications.  This makes sense

that now that she has been able to swallow, and I started her medications, the 

patient is little bit more alert.


Conclusion/Plan: 


Patient is underlying dementia and Parkinson's.  She presented as obtunded 

condition 2 months ago also when she had sepsis


Plan:


continue abx





(4) COVID


Conclusion/Plan: 


According to the ED providers discussion with the daughter by phone, this 

patient tested positive for COVID 1 week ago.


The patient has some hypoxia, patchy infiltrates on chest x-ray


Plan:


IV Decadron Day #8, plan for 10 days


The patient is not a candidate to start remdesivir given her onset being a week 

ago or more





(5) Hypoxia resolved


Conclusion/Plan: 


This is likely from her pneumonia. Even though she was saturating at 97 to 99%, 

she was still getting nasal cannula on her first day here.  Starting on the 27th

we took off her oxygen and she has been 99 to 100% on room air.


Plan:


We will provide supplemental oxygen, saturation target will be 90% or above





(6) UTI (urinary tract infection)


Conclusion/Plan: 


Patient gets frequent UTIs.  The UA now showed a lot of squamous cells therefore

no culture was indicated.


Patient has already been started on empiric ceftriaxone which would treat a UTI 

as well and is treating this pneumonia.  Proteus is growing in her blood.


Plan:


No repeat UA


Continue empiric iv ceftriaxone


Qualifiers: 


   Urinary tract infection type: site unspecified   Hematuria presence: without 

hematuria   Qualified Code(s): N39.0 - Urinary tract infection, site not 

specified   





(7) Parkinson's disease dementia


Conclusion/Plan: 


The daughter had told us, that even at her best, the patient has hallucinations.

I have her meds since she  is swallowing now. Her voice has steadily improved 

now that she has been on medications.





mirtazapine 7.5 p.o. every afternoon.  She is on 2 forms of carbidopa levodopa. 

Extended release 25/100 twice daily.  And carbidopa levodopa 25/100 at 6 in the 

morning, 10 AM, 1400, 1800, and 2200.








(8)Severe protein calorie malnutrition





In April 2017 she was 74 kg.  June 2017 she was 69 kg.  November 2021 she was 54

kg.  January 2023 she was 45 kg.  She is 44 kg on admission.


My suspicion is that she has dysphagia due to her Parkinson's.  Lack of 

coordination with swallowing mechanism.  This is very common with motor neuron 

disease.  Focus is on comfort measures so she is not a candidate for tube feeds 

or PEG tube.














(2) Sepsis


Qualifiers: 


   Qualified Code(s): A41.9 - Sepsis, unspecified organism; R65.21 - Severe seps

is with septic shock; N17.9 - Acute kidney failure, unspecified 154.94